# Patient Record
Sex: FEMALE | Race: WHITE | NOT HISPANIC OR LATINO | Employment: UNEMPLOYED | URBAN - METROPOLITAN AREA
[De-identification: names, ages, dates, MRNs, and addresses within clinical notes are randomized per-mention and may not be internally consistent; named-entity substitution may affect disease eponyms.]

---

## 2006-07-31 LAB
EXTERNAL HIV CONFIRMATION: NORMAL
EXTERNAL HIV SCREEN: NORMAL

## 2017-02-03 ENCOUNTER — ALLSCRIPTS OFFICE VISIT (OUTPATIENT)
Dept: OTHER | Facility: OTHER | Age: 47
End: 2017-02-03

## 2017-02-03 DIAGNOSIS — R42 DIZZINESS AND GIDDINESS: ICD-10-CM

## 2017-05-25 LAB — PAP: NORMAL

## 2017-05-31 ENCOUNTER — ALLSCRIPTS OFFICE VISIT (OUTPATIENT)
Dept: OTHER | Facility: OTHER | Age: 47
End: 2017-05-31

## 2017-10-05 ENCOUNTER — ALLSCRIPTS OFFICE VISIT (OUTPATIENT)
Dept: OTHER | Facility: OTHER | Age: 47
End: 2017-10-05

## 2017-10-27 NOTE — PROGRESS NOTES
Assessment  1  Acute sinusitis (461 9) (J01 90)   2  Acute pain of both ears (388 70) (H92 03)   3  Allergy to environmental factors (V15 09) (Z91 09)    Plan  Acute sinusitis    · Amoxicillin 875 MG Oral Tablet; Take 1 tablet twice daily   · PredniSONE 10 MG Oral Tablet; 3 tab po  qd x 2, 2 tab po qd x 2, 1  tab po qd x 2 then stop  Allergy to environmental factors    · Fluticasone Propionate 50 MCG/ACT Nasal Suspension; USE 2 SPRAYS IN EACH  NOSTRIL ONCE DAILY    Discussion/Summary    #1 acute sinusitisbilat ear painnasal congestion/allergyI reviewed with pt  Suspect that pt has ETD and sinusitis related to underlying allergy  Pt did not want cerumen removed due to ongoing ear painstart Amox 875 bid and pred as directed  Restart flonase and xyzal for allergies  Recheck 1 week if n chagne - earlier if worse  Pt to call for problems or concerns in the interim  The patient was counseled regarding instructions for management,-- risk factor reductions,-- prognosis,-- patient and family education,-- impressions,-- risks and benefits of treatment options,-- importance of compliance with treatment  Possible side effects of new medications were reviewed with the patient/guardian today  The treatment plan was reviewed with the patient/guardian  The patient/guardian understands and agrees with the treatment plan      Chief Complaint  ALLERGIES,EAR PAIN      History of Present Illness  HPI: as abovept with 5 day hx of congestion with frontal/facial HAs and ear pressure  Was on Xyzal with out improvement so she stopped  No fever but has chills  No thick nasal discharge  (+) increased facial pressure with change in head position  Improved with hot shower  Review of Systems    Constitutional: as noted in HPI    ENT: as noted in HPI  Cardiovascular: no complaints of slow or fast heart rate, no chest pain, no palpitations, no leg claudication or lower extremity edema     Respiratory: no complaints of shortness of breath, no wheezing, no dyspnea on exertion, no orthopnea or PND  Musculoskeletal: no complaints of arthralgia, no myalgia, no joint swelling or stiffness, no limb pain or swelling  Integumentary: no complaints of skin rash or lesion, no itching or dry skin, no skin wounds  Active Problems  1  Acute otitis media, unspecified laterality   2  Acute serous otitis media (381 01) (H65 00)   3  Acute sinusitis (461 9) (J01 90)   4  Acute URI (465 9) (J06 9)   5  Anxiety disorder (300 00) (F41 9)   6  Arthralgia (719 40) (M25 50)   7  Back pain (724 5) (M54 9)   8  Bilateral low back pain with sciatica, sciatica laterality unspecified   9  Blood pressure elevated (401 9) (I10)   10  Bronchitis (490) (J40)   11  Chest tightness or pressure (786 59) (R07 89)   12  Cigarette smoker (305 1) (F17 210)   13  Constipation, unspecified constipation type (564 00) (K59 00)   14  Cough (786 2) (R05)   15  DDD (degenerative disc disease), lumbosacral (722 52) (M51 37)   16  Depression (311) (F32 9)   17  Dysfunction of Eustachian tube, unspecified laterality (381 81) (H69 80)   18  Dysfunction of right eustachian tube (381 81) (H69 81)   19  Dyspnea (786 09) (R06 00)   20  Impacted cerumen, unspecified laterality (380 4) (H61 20)   21  Insomnia (780 52) (G47 00)   22  Lumbar radiculitis (724 4) (M54 16)   23  Myofascial pain (729 1) (M79 1)   24  Palpitations (785 1) (R00 2)   25  Parotitis (527 2)   26  Skin rash (782 1) (R21)   27  Vertigo (780 4) (R42)    Past Medical History  1  No pertinent past medical history  Active Problems And Past Medical History Reviewed: The active problems and past medical history were reviewed and updated today  Family History  Mother    1  Family history of Hypertension (V17 49)  Father    2   Family history of Coronary Artery Disease (V17 49)    Social History   · Denied: History of Alcohol Use (History)   · Caffeine Use   · Cigarette smoker (305 1) (F17 210)   · Current Every Day Smoker (305 1)   · Daily Coffee Consumption (___ Cups/Day)   · Marital History - Currently   The social history was reviewed and updated today  Surgical History  1  Denied: History Of Prior Surgery    Current Meds   1  Temazepam 30 MG Oral Capsule; TAKE 1 CAPSULE BY MOUTH EVERY NIGHT AT   BEDTIME; Therapy: [id number] to (Evaluate:18Oct2017)  Requested for: [id number]; Last   Rx:[id number] Ordered    The medication list was reviewed and updated today  Allergies  1  Ambien TABS  2  Eggs    Vitals   Recorded: [id number] 08:00AM   Temperature 97 6 F   Heart Rate 74   Respiration 16   Systolic 549   Diastolic 72   Height 5 ft 6 in   Weight 153 lb 4 oz   BMI Calculated 24 74   BSA Calculated 1 79     Physical Exam    Constitutional   General appearance: Abnormal  -- mildly ill appearing female in NAD  Eyes   Conjunctiva and lids: No swelling, erythema or discharge  Pupils and irises: Equal, round and reactive to light  Ears, Nose, Mouth, and Throat   External inspection of ears and nose: Normal     Otoscopic examination: Abnormal  -- bilat cerumen impaction  Nasal mucosa, septum, and turbinates: Abnormal  -- nose with sl congestion  ethmoid and maxillary sinuses sl TTP  Oropharynx: Normal with no erythema, edema, exudate or lesions  Pulmonary   Respiratory effort: No increased work of breathing or signs of respiratory distress  Auscultation of lungs: Clear to auscultation  Cardiovascular   Auscultation of heart: Normal rate and rhythm, normal S1 and S2, without murmurs  Lymphatic   Palpation of lymph nodes in neck: No lymphadenopathy  Skin   Skin and subcutaneous tissue: Normal without rashes or lesions           Signatures   Electronically signed by : SAMSON Henry ; Oct  5 2017 10:32AM EST                       (Author)

## 2018-01-13 VITALS
DIASTOLIC BLOOD PRESSURE: 80 MMHG | HEART RATE: 76 BPM | TEMPERATURE: 98.5 F | WEIGHT: 147.38 LBS | HEIGHT: 66 IN | SYSTOLIC BLOOD PRESSURE: 118 MMHG | BODY MASS INDEX: 23.69 KG/M2

## 2018-01-14 VITALS
DIASTOLIC BLOOD PRESSURE: 72 MMHG | BODY MASS INDEX: 24.63 KG/M2 | SYSTOLIC BLOOD PRESSURE: 118 MMHG | RESPIRATION RATE: 16 BRPM | HEART RATE: 74 BPM | HEIGHT: 66 IN | WEIGHT: 153.25 LBS | TEMPERATURE: 97.6 F

## 2018-01-15 VITALS
BODY MASS INDEX: 24.27 KG/M2 | DIASTOLIC BLOOD PRESSURE: 74 MMHG | TEMPERATURE: 96.7 F | SYSTOLIC BLOOD PRESSURE: 128 MMHG | RESPIRATION RATE: 16 BRPM | HEIGHT: 66 IN | WEIGHT: 151 LBS | HEART RATE: 78 BPM

## 2018-01-26 ENCOUNTER — TELEPHONE (OUTPATIENT)
Dept: FAMILY MEDICINE CLINIC | Facility: CLINIC | Age: 48
End: 2018-01-26

## 2018-01-29 ENCOUNTER — OFFICE VISIT (OUTPATIENT)
Dept: FAMILY MEDICINE CLINIC | Facility: CLINIC | Age: 48
End: 2018-01-29
Payer: COMMERCIAL

## 2018-01-29 VITALS
SYSTOLIC BLOOD PRESSURE: 130 MMHG | WEIGHT: 146 LBS | HEIGHT: 67 IN | TEMPERATURE: 97.8 F | BODY MASS INDEX: 22.91 KG/M2 | DIASTOLIC BLOOD PRESSURE: 78 MMHG | HEART RATE: 70 BPM

## 2018-01-29 DIAGNOSIS — F33.1 MODERATE EPISODE OF RECURRENT MAJOR DEPRESSIVE DISORDER (HCC): ICD-10-CM

## 2018-01-29 DIAGNOSIS — F17.200 SMOKER: ICD-10-CM

## 2018-01-29 DIAGNOSIS — F41.9 ANXIETY: Primary | ICD-10-CM

## 2018-01-29 PROCEDURE — 99213 OFFICE O/P EST LOW 20 MIN: CPT | Performed by: FAMILY MEDICINE

## 2018-01-29 RX ORDER — TEMAZEPAM 30 MG/1
1 CAPSULE ORAL DAILY
COMMUNITY
Start: 2014-03-17 | End: 2018-01-29 | Stop reason: ALTCHOICE

## 2018-01-29 RX ORDER — LORAZEPAM 2 MG/1
2 TABLET ORAL EVERY 8 HOURS PRN
Qty: 90 TABLET | Refills: 0 | Status: SHIPPED | OUTPATIENT
Start: 2018-01-29 | End: 2018-02-28 | Stop reason: ALTCHOICE

## 2018-01-29 NOTE — PROGRESS NOTES
Assessment/Plan: Moderate episode of recurrent major depressive disorder (HCC)  Change temazepam to lorazepam  Refer for counseling and psychiatry for eval and further treatment    Anxiety  Change meds as below       Diagnoses and all orders for this visit:    Anxiety  -     LORazepam (ATIVAN) 2 mg tablet; Take 1 tablet (2 mg total) by mouth every 8 (eight) hours as needed for anxiety for up to 30 days    Moderate episode of recurrent major depressive disorder (HCC)    Other orders  -     Discontinue: temazepam (RESTORIL) 30 mg capsule; Take 1 capsule by mouth daily        Discussion: counselled pt  Refer to [facility] (communication sent to Lili Melara for eval)  Change meds as above  Discussed side effects  Also counseled pt re: smoking  Recheck 1m  Subjective:      Patient ID: Rubia Roger is a 49 y o  female  50 yo female here with increased stress and anxiety  Multiple stressors (family - son with Aspergers and school issues, conflict with her parent)  Pt has been having issues with decreased sleep despite taking Temazepam 30mg qd  Pt was taking it bid over the last week due to increased anxiety/stress  Pt is interested in talking with someone as well as seeing a psychiatrist  Pt is afraid to take any new meds at this time due to trying to get sons issues with the school system settled  PHQ-9 done      Anxiety   Symptoms include nervous/anxious behavior  The following portions of the patient's history were reviewed and updated as appropriate:   She  has no past medical history on file  She  does not have any pertinent problems on file  Her family history is not on file  She  has no tobacco, alcohol, and drug history on file    Current Outpatient Prescriptions   Medication Sig Dispense Refill    LORazepam (ATIVAN) 2 mg tablet Take 1 tablet (2 mg total) by mouth every 8 (eight) hours as needed for anxiety for up to 30 days 90 tablet 0     No current facility-administered medications for this visit  No current outpatient prescriptions on file prior to visit  No current facility-administered medications on file prior to visit  She is allergic to eggs or egg-derived products and zolpidem       Review of Systems   Constitutional: Negative  HENT: Negative  Eyes: Negative  Respiratory: Negative  Cardiovascular: Negative  Gastrointestinal: Negative  Endocrine: Negative  Genitourinary: Negative  Musculoskeletal: Negative  Neurological: Negative  Psychiatric/Behavioral: Positive for dysphoric mood  Negative for agitation, behavioral problems, hallucinations and self-injury  The patient is nervous/anxious  The patient is not hyperactive  Objective:     Physical Exam   Constitutional: She is oriented to person, place, and time  She appears well-developed and well-nourished  HENT:   Head: Normocephalic and atraumatic  Right Ear: External ear normal    Left Ear: External ear normal    Mouth/Throat: Oropharynx is clear and moist    Eyes: Conjunctivae and EOM are normal  Pupils are equal, round, and reactive to light  Neck: Normal range of motion  Neck supple  No thyromegaly present  Cardiovascular: Normal rate and regular rhythm  No murmur heard  Pulmonary/Chest: Effort normal and breath sounds normal    Neurological: She is alert and oriented to person, place, and time  Psychiatric:   Anxious  Appropriate  PHQ-9 = 16   No suicidality

## 2018-01-29 NOTE — LETTER
Nickie Schultz,     I have a young woman who is suffering from worsening anxiety some depression complicated by insomnia  Multiple stressors  Really would benefit from counseling as well as psychiatric eval  Could you or Izabella reach out to her?   Thanks    son

## 2018-02-28 ENCOUNTER — OFFICE VISIT (OUTPATIENT)
Dept: FAMILY MEDICINE CLINIC | Facility: CLINIC | Age: 48
End: 2018-02-28
Payer: COMMERCIAL

## 2018-02-28 VITALS
HEIGHT: 67 IN | HEART RATE: 70 BPM | WEIGHT: 147 LBS | SYSTOLIC BLOOD PRESSURE: 124 MMHG | DIASTOLIC BLOOD PRESSURE: 78 MMHG | BODY MASS INDEX: 23.07 KG/M2 | TEMPERATURE: 98.6 F

## 2018-02-28 DIAGNOSIS — F41.9 ANXIETY: Primary | ICD-10-CM

## 2018-02-28 DIAGNOSIS — F33.1 MODERATE EPISODE OF RECURRENT MAJOR DEPRESSIVE DISORDER (HCC): ICD-10-CM

## 2018-02-28 PROCEDURE — 99213 OFFICE O/P EST LOW 20 MIN: CPT | Performed by: FAMILY MEDICINE

## 2018-02-28 RX ORDER — ALPRAZOLAM 1 MG/1
1 TABLET ORAL 3 TIMES DAILY PRN
Qty: 90 TABLET | Refills: 0 | Status: SHIPPED | OUTPATIENT
Start: 2018-02-28 | End: 2018-02-28 | Stop reason: CLARIF

## 2018-02-28 RX ORDER — LORAZEPAM 2 MG/1
2 TABLET ORAL EVERY 8 HOURS PRN
Qty: 90 TABLET | Refills: 0
Start: 2018-02-28 | End: 2018-03-19 | Stop reason: ALTCHOICE

## 2018-02-28 NOTE — PROGRESS NOTES
Assessment/Plan:     Diagnoses and all orders for this visit:    Anxiety  -     Discontinue: ALPRAZolam (XANAX) 1 mg tablet; Take 1 tablet (1 mg total) by mouth 3 (three) times a day as needed for anxiety  -     LORazepam (ATIVAN) 2 mg tablet; Take 1 tablet (2 mg total) by mouth every 8 (eight) hours as needed for anxiety for up to 30 days    Moderate episode of recurrent major depressive disorder ([facility] [place] [facility] )      Discussion:  Unclear if initial side effects to lorazepam related to withdrawal from Restoril or other cause  Patient did use medications successfully to stop a panic attack  But given to changing patient to alprazolam but after further discussion we decided to continue patient on present meds for 2 more weeks  I will reach out to [facility] to see if we can facilitate obtaining appointment with Psychiatry for her  Again patient has failed multiple medications in the past and really could use a 2nd opinion  Patient will call in 2 weeks with [id number] if worse  Patient call for problems or concerns in the interim      Subjective:      Patient ID: Anne Marie Brewster is a 49 y o  female  - pt notes that she does not feel well on the Lorazepam   It seems to upset her stomach and  notes that she may be a little more agitated on it  Pt did speak with [facility]) but pt has not been able to make appt yet  Pt did take it succesfully to abort a panic attack  Typically, pt has been taking 1/2 q noon and 1 in the evening  Denies suicidality / violent thoughts  Sleep still labile        The following portions of the patient's history were reviewed and updated as appropriate:   She  has no past medical history on file    She   Patient Active Problem List    Diagnosis Date Noted    Moderate episode of recurrent major depressive disorder ([facility] [place] [facility] ) 01/29/2018    Anxiety 01/29/2018    Depression 02/26/2016    DDD (degenerative disc disease), lumbosacral 11/16/2015    Lumbar radiculitis 10/20/2015    Chronic bilateral low back pain with sciatica 10/20/2015    Essential (primary) hypertension 04/07/2015    Anxiety disorder 09/06/2012    Insomnia 08/28/2012     She  reports that she has been smoking Cigarettes  She does not have any smokeless tobacco history on file  She reports that she does not drink alcohol  Her drug history is not on file  Current Outpatient Prescriptions   Medication Sig Dispense Refill    LORazepam (ATIVAN) 2 mg tablet Take 1 tablet (2 mg total) by mouth every 8 (eight) hours as needed for anxiety for up to 30 days 90 tablet 0     No current facility-administered medications for this visit  She is allergic to eggs or egg-derived products and zolpidem       Review of Systems   Constitutional: Negative  HENT: Negative  Eyes: Negative  Respiratory: Negative  Cardiovascular: Negative  Gastrointestinal: Negative  Endocrine: Negative  Genitourinary: Negative  Musculoskeletal: Negative  Neurological: Negative  Psychiatric/Behavioral: Positive for dysphoric mood  Negative for agitation, behavioral problems, hallucinations and self-injury  The patient is nervous/anxious  The patient is not hyperactive  Objective:      /78   Pulse 70   Temp 98 6 °F (37 °C)   Ht 5' 7" (1 702 m)   Wt 66 7 kg (147 lb)   BMI 23 02 kg/m²          Physical Exam   Constitutional: She appears well-developed and well-nourished  HENT:   Head: Normocephalic and atraumatic  Right Ear: External ear normal    Left Ear: External ear normal    Nose: Nose normal    Mouth/Throat: Oropharynx is clear and moist    Eyes: Conjunctivae and EOM are normal  Pupils are equal, round, and reactive to light  Neck: Normal range of motion  Neck supple  No thyromegaly present  Cardiovascular: Normal rate and regular rhythm  Pulmonary/Chest: Effort normal and breath sounds normal    Psychiatric: Her mood appears anxious   Her affect is not labile and not inappropriate  Her speech is not rapid and/or pressured, not delayed, not tangential and not slurred  She is agitated  She is not aggressive, not hyperactive, not slowed, not withdrawn, not actively hallucinating and not combative  Thought content is not paranoid and not delusional  She does not express impulsivity or inappropriate judgment  She does not exhibit a depressed mood  She expresses no homicidal and no suicidal ideation  She is communicative

## 2018-02-28 NOTE — LETTER
Arabella Callahan,     I am with Leeann Mejia now  She is going to try to get in with you but I was wondering if you would be able to help us get her in for a psychiatric eval  Pt has failed multiple meds for her anxiety and I really feel that she would benefit from a second opinion at this point  Let me know   Thanks    [facility]

## 2018-03-09 ENCOUNTER — TELEPHONE (OUTPATIENT)
Dept: FAMILY MEDICINE CLINIC | Facility: CLINIC | Age: 48
End: 2018-03-09

## 2018-03-09 NOTE — TELEPHONE ENCOUNTER
STARTED A NEW MED LAST WEEK LORAZEPAM 2MG     SHE SAID ITS HELPING HER   AND WILL CONTINUE TO TAKE IT

## 2018-03-19 ENCOUNTER — TELEPHONE (OUTPATIENT)
Dept: FAMILY MEDICINE CLINIC | Facility: CLINIC | Age: 48
End: 2018-03-19

## 2018-03-19 DIAGNOSIS — F51.01 PRIMARY INSOMNIA: ICD-10-CM

## 2018-03-19 DIAGNOSIS — F41.9 ANXIETY: ICD-10-CM

## 2018-03-19 DIAGNOSIS — F41.9 ANXIETY: Primary | ICD-10-CM

## 2018-03-19 RX ORDER — TEMAZEPAM 30 MG/1
30 CAPSULE ORAL 2 TIMES DAILY PRN
Qty: 60 CAPSULE | Refills: 0 | Status: SHIPPED | OUTPATIENT
Start: 2018-03-19 | End: 2018-03-19 | Stop reason: SDUPTHER

## 2018-03-19 RX ORDER — TEMAZEPAM 30 MG/1
30 CAPSULE ORAL 2 TIMES DAILY PRN
Qty: 60 CAPSULE | Refills: 0 | Status: SHIPPED | OUTPATIENT
Start: 2018-03-19 | End: 2018-04-18 | Stop reason: SDUPTHER

## 2018-03-19 NOTE — TELEPHONE ENCOUNTER
Lorazapam is not helping, she is aggitated, blur  Vision, stom  Issues, she is on the sec  Month of it  Wants to go bk on ? Temasapam but a 15 mg #60 and [facility]  Pl adv

## 2018-03-19 NOTE — TELEPHONE ENCOUNTER
I will write enough for one month but she has to agree to get an appt with psychiatry  Also, she has to use the [facility] to be get the discount and to find which store has it the cheapest  We will print out the script   She has to bring in her lorazepam for us to dispense when she picks up the script

## 2018-04-18 DIAGNOSIS — F41.9 ANXIETY: ICD-10-CM

## 2018-04-18 DIAGNOSIS — F51.01 PRIMARY INSOMNIA: ICD-10-CM

## 2018-04-18 RX ORDER — TEMAZEPAM 30 MG/1
30 CAPSULE ORAL 2 TIMES DAILY PRN
Qty: 60 CAPSULE | Refills: 0 | Status: SHIPPED | OUTPATIENT
Start: 2018-04-18 | End: 2018-05-25 | Stop reason: SDUPTHER

## 2018-05-25 DIAGNOSIS — F51.01 PRIMARY INSOMNIA: ICD-10-CM

## 2018-05-25 DIAGNOSIS — F41.9 ANXIETY: ICD-10-CM

## 2018-05-25 RX ORDER — TEMAZEPAM 30 MG/1
30 CAPSULE ORAL 2 TIMES DAILY PRN
Qty: 60 CAPSULE | Refills: 0 | Status: SHIPPED | OUTPATIENT
Start: 2018-05-25 | End: 2018-07-02 | Stop reason: SDUPTHER

## 2018-06-12 ENCOUNTER — OFFICE VISIT (OUTPATIENT)
Dept: FAMILY MEDICINE CLINIC | Facility: CLINIC | Age: 48
End: 2018-06-12
Payer: COMMERCIAL

## 2018-06-12 VITALS
HEART RATE: 70 BPM | WEIGHT: 159.6 LBS | SYSTOLIC BLOOD PRESSURE: 130 MMHG | DIASTOLIC BLOOD PRESSURE: 82 MMHG | HEIGHT: 67 IN | BODY MASS INDEX: 25.05 KG/M2 | TEMPERATURE: 97.9 F

## 2018-06-12 DIAGNOSIS — J01.90 ACUTE NON-RECURRENT SINUSITIS, UNSPECIFIED LOCATION: Primary | ICD-10-CM

## 2018-06-12 DIAGNOSIS — B37.9 YEAST INFECTION: ICD-10-CM

## 2018-06-12 PROCEDURE — 3008F BODY MASS INDEX DOCD: CPT | Performed by: NURSE PRACTITIONER

## 2018-06-12 PROCEDURE — 99213 OFFICE O/P EST LOW 20 MIN: CPT | Performed by: NURSE PRACTITIONER

## 2018-06-12 RX ORDER — AMOXICILLIN 875 MG/1
875 TABLET, COATED ORAL 2 TIMES DAILY
Qty: 20 TABLET | Refills: 0 | Status: SHIPPED | OUTPATIENT
Start: 2018-06-12 | End: 2018-06-22

## 2018-06-12 RX ORDER — FLUCONAZOLE 150 MG/1
150 TABLET ORAL ONCE
Qty: 1 TABLET | Refills: 0 | Status: SHIPPED | OUTPATIENT
Start: 2018-06-12 | End: 2018-06-12

## 2018-06-12 NOTE — PROGRESS NOTES
Patient ID: James Danielson is a 49 y o  female  HPI: 49 y  o female presenting with nasal congestion, right ear pain/pressure and non-productive cough for last week and half  Patient reports that she as been taking Zyrtec to treat allergy symptoms but she keeps feeling worsen with time  She denies fever, chills or fatigue being associated with above symptoms  She also reports that sometimes she feels like her right ear becomes completely blocked with decreased hearing occurring and she can usually clear it by tilted her head backwards  SUBJECTIVE    Family History   Problem Relation Age of Onset    Hypertension Mother     Coronary artery disease Father      Social History     Social History    Marital status: /Civil Union     Spouse name: N/A    Number of children: N/A    Years of education: N/A     Occupational History    Not on file  Social History Main Topics    Smoking status: Current Every Day Smoker     Types: Cigarettes    Smokeless tobacco: Not on file    Alcohol use No    Drug use: Unknown    Sexual activity: Not on file     Other Topics Concern    Not on file     Social History Narrative    Caffeine use    Daily coffee consumption     No past medical history on file  No past surgical history on file    Allergies   Allergen Reactions    Eggs Or Egg-Derived Products Abdominal Pain    Zolpidem Confusion       Current Outpatient Prescriptions:     temazepam (RESTORIL) 30 mg capsule, Take 1 capsule (30 mg total) by mouth 2 (two) times a day as needed for sleep, Disp: 60 capsule, Rfl: 0    Review of Systems    Consitutional:  Denies chills, fatigue and fever   ENT:  Positive for right ear pain/pressure, loss of hearing, nasal discharge, nasal congestion, post nasal drip and itchy/watery eyes   Pulmonary:  Denies cough, shortness of breath or dyspnea on exertion    Cardiovascular:  Denies chest pain/pressure   Musculoskeletal:  Denies myalgia, arthalgia or muscle weakness Integumentary:  Denies ecchymosis, petechiae, rash or lesions   Neurological:  Denies headaches, dizziness, confusion, loss of consciousness or behavioral changes  Psychological:  Denies anxiety, depression or sleep disturbances      OBJECTIVE    /82   Pulse 70   Temp 97 9 °F (36 6 °C)   Ht 5' 7" (1 702 m)   Wt 72 4 kg (159 lb 9 6 oz)   BMI 25 00 kg/m²     Constitutional:  Well appearing and in no acute distress  ENT:  Right TM dull without erythema or effusion noted, small amount of cerumen in canal, posterior pharynx erythematous with post nasal drip noted, maxillary sinus tender to palpation with positive forward head tilt, and nasal mucosa erythematous and congested   Pulmonary:  clear to auscultation bilaterally and no crackles, no wheezes, chest expansion normal  Cardiovascular:  S1S2, regular rate and rhythm  Lymphatic:  no lymphadenopathy   Musculoskeletal:  no muscular tenderness noted  Skin:  skin color, texture and turgor are normal; no bruising, rashes or lesions noted  Neurologic:  Alert and oriented x 4 and Affect and mood normal      Assessment/Plan:  Diagnoses and all orders for this visit:    Acute non-recurrent sinusitis, unspecified location  -     amoxicillin (AMOXIL) 875 mg tablet; Take 1 tablet (875 mg total) by mouth 2 (two) times a day for 10 days    Yeast infection  -     fluconazole (DIFLUCAN) 150 mg tablet;  Take 1 tablet (150 mg total) by mouth once for 1 dose      #1 Acute non-recurrent sinusitis  Reviewed with patient plan to treat with amoxicillin 875 mg twice a day for 10 days  Recommended use of Mucinex to thin secretions  #2 Yeast infection  Discussed with patient frequent occurrence of yeast infection when on antibiotics so fluconazole 150 mg once given as prophylaxis  Patient instructed to call in 72 hours if not feeling better or if symptoms worsen

## 2018-07-02 DIAGNOSIS — F41.9 ANXIETY: ICD-10-CM

## 2018-07-02 DIAGNOSIS — F51.01 PRIMARY INSOMNIA: ICD-10-CM

## 2018-07-02 RX ORDER — TEMAZEPAM 30 MG/1
30 CAPSULE ORAL 2 TIMES DAILY PRN
Qty: 60 CAPSULE | Refills: 0 | Status: SHIPPED | OUTPATIENT
Start: 2018-07-02 | End: 2018-08-02 | Stop reason: SDUPTHER

## 2018-08-02 DIAGNOSIS — F51.01 PRIMARY INSOMNIA: ICD-10-CM

## 2018-08-02 DIAGNOSIS — F41.9 ANXIETY: ICD-10-CM

## 2018-08-02 RX ORDER — TEMAZEPAM 30 MG/1
30 CAPSULE ORAL 2 TIMES DAILY PRN
Qty: 60 CAPSULE | Refills: 0 | Status: SHIPPED | OUTPATIENT
Start: 2018-08-02 | End: 2018-09-06 | Stop reason: SDUPTHER

## 2018-09-06 DIAGNOSIS — F51.01 PRIMARY INSOMNIA: ICD-10-CM

## 2018-09-06 DIAGNOSIS — F41.9 ANXIETY: ICD-10-CM

## 2018-09-06 RX ORDER — TEMAZEPAM 30 MG/1
30 CAPSULE ORAL 2 TIMES DAILY PRN
Qty: 120 CAPSULE | Refills: 0 | Status: CANCELLED | OUTPATIENT
Start: 2018-09-06

## 2018-09-07 RX ORDER — TEMAZEPAM 30 MG/1
30 CAPSULE ORAL 2 TIMES DAILY PRN
Qty: 60 CAPSULE | Refills: 0 | Status: SHIPPED | OUTPATIENT
Start: 2018-09-07 | End: 2018-10-12 | Stop reason: SDUPTHER

## 2018-10-02 ENCOUNTER — OFFICE VISIT (OUTPATIENT)
Dept: FAMILY MEDICINE CLINIC | Facility: CLINIC | Age: 48
End: 2018-10-02
Payer: COMMERCIAL

## 2018-10-02 VITALS
SYSTOLIC BLOOD PRESSURE: 144 MMHG | WEIGHT: 156.4 LBS | BODY MASS INDEX: 24.55 KG/M2 | TEMPERATURE: 96.9 F | HEIGHT: 67 IN | DIASTOLIC BLOOD PRESSURE: 88 MMHG | HEART RATE: 66 BPM

## 2018-10-02 DIAGNOSIS — J01.00 ACUTE NON-RECURRENT MAXILLARY SINUSITIS: Primary | ICD-10-CM

## 2018-10-02 DIAGNOSIS — H91.92 DECREASED HEARING OF LEFT EAR: ICD-10-CM

## 2018-10-02 PROCEDURE — 99213 OFFICE O/P EST LOW 20 MIN: CPT | Performed by: NURSE PRACTITIONER

## 2018-10-02 PROCEDURE — 69210 REMOVE IMPACTED EAR WAX UNI: CPT | Performed by: NURSE PRACTITIONER

## 2018-10-02 RX ORDER — AZITHROMYCIN 250 MG/1
TABLET, FILM COATED ORAL
Qty: 6 TABLET | Refills: 0 | Status: SHIPPED | OUTPATIENT
Start: 2018-10-02 | End: 2018-10-06

## 2018-10-02 NOTE — PROGRESS NOTES
Assessment/Plan:     Diagnoses and all orders for this visit:    Acute non-recurrent maxillary sinusitis  -     azithromycin (ZITHROMAX) 250 mg tablet; Take 2 tabs on Day 1 than 1 tab Days 2-5    Decreased hearing of left ear  -     Ear cerumen removal    Reviewed with patient plan to treat with above antibiotic  Patient instructed to call if no improvement in 72 hours or symptoms worsen    Subjective:      Patient ID: Jenny Felipe is a 49 y o  female  49year old female presenting with left ear pressure/clogged and slight sinus pressure for past few days  She denies fever, chills, fatigue or muscle aches being associated with above symptoms  Patient as not used any form of treatment modalities to manage the symptoms  Patient as history of cerumen impaction  Family History   Problem Relation Age of Onset    Hypertension Mother     Coronary artery disease Father      Social History     Social History    Marital status: /Civil Union     Spouse name: N/A    Number of children: N/A    Years of education: N/A     Occupational History    Not on file  Social History Main Topics    Smoking status: Current Every Day Smoker     Types: Cigarettes    Smokeless tobacco: Not on file    Alcohol use No    Drug use: Unknown    Sexual activity: Not on file     Other Topics Concern    Not on file     Social History Narrative    Caffeine use    Daily coffee consumption     No past medical history on file  No past surgical history on file  Allergies   Allergen Reactions    Eggs Or Egg-Derived Products Abdominal Pain    Zolpidem Confusion       Current Outpatient Prescriptions:     temazepam (RESTORIL) 30 mg capsule, Take 1 capsule (30 mg total) by mouth 2 (two) times a day as needed for sleep, Disp: 60 capsule, Rfl: 0    azithromycin (ZITHROMAX) 250 mg tablet, Take 2 tabs on Day 1 than 1 tab Days 2-5, Disp: 6 tablet, Rfl: 0    Review of Systems   Constitutional: Negative      HENT: Positive for congestion and ear pain  Eyes: Negative  Respiratory: Positive for cough ( occasional non-productive)  Cardiovascular: Negative  Gastrointestinal: Negative  Endocrine: Negative  Genitourinary: Negative  Musculoskeletal: Negative  Skin: Negative  Allergic/Immunologic: Negative  Neurological: Negative  Hematological: Negative  Psychiatric/Behavioral: Negative  Objective:    /88   Pulse 66   Temp (!) 96 9 °F (36 1 °C)   Ht 5' 7" (1 702 m)   Wt 70 9 kg (156 lb 6 4 oz)   BMI 24 50 kg/m² (Reviewed)     Physical Exam   Constitutional: She is oriented to person, place, and time  Vital signs are normal  She appears well-developed and well-nourished  HENT:   Head: Normocephalic and atraumatic  Right Ear: External ear and ear canal normal  Tympanic membrane is erythematous  Left Ear: External ear and ear canal normal  Tympanic membrane is erythematous  Decreased hearing is noted  Nose: Mucosal edema and rhinorrhea present  Right sinus exhibits maxillary sinus tenderness  Left sinus exhibits maxillary sinus tenderness  Mouth/Throat: Uvula is midline and mucous membranes are normal  Posterior oropharyngeal erythema present  Cerumen required to be removed prior to visualizing TM   Eyes: Pupils are equal, round, and reactive to light  Conjunctivae, EOM and lids are normal    Cardiovascular: Normal rate, regular rhythm and normal heart sounds  Pulmonary/Chest: Effort normal and breath sounds normal    Neurological: She is alert and oriented to person, place, and time  Skin: Skin is warm and dry  Psychiatric: She has a normal mood and affect   Her behavior is normal          Ear cerumen removal  Date/Time: 10/2/2018 1:01 PM  Performed by: Jerry Lunsford  Authorized by: Jerry Lunsford     Patient location:  Clinic  Consent:     Consent obtained:  Verbal    Consent given by:  Patient    Risks discussed:  Bleeding, dizziness, infection, incomplete removal, pain and TM perforation    Alternatives discussed:  No treatment and observation  Universal protocol:     Procedure explained and questions answered to patient or proxy's satisfaction: yes      Immediately prior to procedure a time out was called: yes      Patient identity confirmed:  Verbally with patient  Procedure details:     Local anesthetic:  None    Location:  L ear    Procedure type: curette    Post-procedure details:     Complication:  None    Hearing quality:  Improved    Patient tolerance of procedure:   Tolerated well, no immediate complications

## 2018-10-12 DIAGNOSIS — F41.9 ANXIETY: ICD-10-CM

## 2018-10-12 DIAGNOSIS — F51.01 PRIMARY INSOMNIA: ICD-10-CM

## 2018-10-12 RX ORDER — TEMAZEPAM 30 MG/1
30 CAPSULE ORAL 2 TIMES DAILY PRN
Qty: 60 CAPSULE | Refills: 0 | Status: SHIPPED | OUTPATIENT
Start: 2018-10-12 | End: 2018-11-12 | Stop reason: SDUPTHER

## 2018-11-12 DIAGNOSIS — F51.01 PRIMARY INSOMNIA: ICD-10-CM

## 2018-11-12 DIAGNOSIS — F41.9 ANXIETY: ICD-10-CM

## 2018-11-12 RX ORDER — TEMAZEPAM 30 MG/1
30 CAPSULE ORAL 2 TIMES DAILY PRN
Qty: 60 CAPSULE | Refills: 0 | Status: SHIPPED | OUTPATIENT
Start: 2018-11-12 | End: 2018-12-18 | Stop reason: SDUPTHER

## 2018-12-18 DIAGNOSIS — F41.9 ANXIETY: ICD-10-CM

## 2018-12-18 DIAGNOSIS — F51.01 PRIMARY INSOMNIA: ICD-10-CM

## 2018-12-18 RX ORDER — TEMAZEPAM 30 MG/1
30 CAPSULE ORAL 2 TIMES DAILY PRN
Qty: 60 CAPSULE | Refills: 0 | Status: SHIPPED | OUTPATIENT
Start: 2018-12-18 | End: 2019-01-23 | Stop reason: SDUPTHER

## 2019-01-23 ENCOUNTER — TELEPHONE (OUTPATIENT)
Dept: FAMILY MEDICINE CLINIC | Facility: CLINIC | Age: 49
End: 2019-01-23

## 2019-01-23 DIAGNOSIS — F51.01 PRIMARY INSOMNIA: Primary | ICD-10-CM

## 2019-01-23 DIAGNOSIS — F51.01 PRIMARY INSOMNIA: ICD-10-CM

## 2019-01-23 DIAGNOSIS — F41.9 ANXIETY: ICD-10-CM

## 2019-01-23 RX ORDER — TEMAZEPAM 30 MG/1
30 CAPSULE ORAL
Qty: 30 CAPSULE | Refills: 0 | Status: SHIPPED | OUTPATIENT
Start: 2019-01-23 | End: 2019-01-28 | Stop reason: SDUPTHER

## 2019-01-23 RX ORDER — TEMAZEPAM 30 MG/1
CAPSULE ORAL
Qty: 60 CAPSULE | Refills: 0 | Status: SHIPPED | OUTPATIENT
Start: 2019-01-23 | End: 2019-01-23 | Stop reason: SDUPTHER

## 2019-01-25 ENCOUNTER — TELEPHONE (OUTPATIENT)
Dept: FAMILY MEDICINE CLINIC | Facility: CLINIC | Age: 49
End: 2019-01-25

## 2019-01-25 NOTE — TELEPHONE ENCOUNTER
Temazepam 30mg - 30 day supply was $0 82, I can do a prior auth for medication if you wanted directions to continue at two tabs at bedtime, for a 60 day supply for next month  please let me know the correct sig

## 2019-01-28 DIAGNOSIS — F51.01 PRIMARY INSOMNIA: ICD-10-CM

## 2019-01-28 RX ORDER — TEMAZEPAM 30 MG/1
CAPSULE ORAL
Qty: 60 CAPSULE | Refills: 0 | Status: SHIPPED | OUTPATIENT
Start: 2019-01-28 | End: 2019-04-04 | Stop reason: SDUPTHER

## 2019-02-22 DIAGNOSIS — F51.01 PRIMARY INSOMNIA: ICD-10-CM

## 2019-02-23 RX ORDER — TEMAZEPAM 30 MG/1
CAPSULE ORAL
Qty: 60 CAPSULE | Refills: 0 | OUTPATIENT
Start: 2019-02-23

## 2019-03-27 ENCOUNTER — OFFICE VISIT (OUTPATIENT)
Dept: FAMILY MEDICINE CLINIC | Facility: CLINIC | Age: 49
End: 2019-03-27
Payer: COMMERCIAL

## 2019-03-27 ENCOUNTER — APPOINTMENT (OUTPATIENT)
Dept: LAB | Age: 49
End: 2019-03-27
Payer: COMMERCIAL

## 2019-03-27 VITALS
DIASTOLIC BLOOD PRESSURE: 84 MMHG | TEMPERATURE: 98.4 F | HEART RATE: 68 BPM | SYSTOLIC BLOOD PRESSURE: 128 MMHG | WEIGHT: 151.4 LBS | HEIGHT: 67 IN | BODY MASS INDEX: 23.76 KG/M2

## 2019-03-27 DIAGNOSIS — L50.9 URTICARIA: Primary | ICD-10-CM

## 2019-03-27 DIAGNOSIS — L50.9 URTICARIA: ICD-10-CM

## 2019-03-27 DIAGNOSIS — G89.29 CHRONIC LEFT-SIDED LOW BACK PAIN WITH BILATERAL SCIATICA: ICD-10-CM

## 2019-03-27 DIAGNOSIS — M54.42 CHRONIC LEFT-SIDED LOW BACK PAIN WITH BILATERAL SCIATICA: ICD-10-CM

## 2019-03-27 DIAGNOSIS — N92.6 IRREGULAR MENSTRUATION: ICD-10-CM

## 2019-03-27 DIAGNOSIS — M54.41 CHRONIC LEFT-SIDED LOW BACK PAIN WITH BILATERAL SCIATICA: ICD-10-CM

## 2019-03-27 LAB
ALBUMIN SERPL BCP-MCNC: 4.4 G/DL (ref 3.5–5)
ALP SERPL-CCNC: 66 U/L (ref 46–116)
ALT SERPL W P-5'-P-CCNC: 20 U/L (ref 12–78)
ANION GAP SERPL CALCULATED.3IONS-SCNC: 7 MMOL/L (ref 4–13)
AST SERPL W P-5'-P-CCNC: 10 U/L (ref 5–45)
BASOPHILS # BLD AUTO: 0.04 THOUSANDS/ΜL (ref 0–0.1)
BASOPHILS NFR BLD AUTO: 0 % (ref 0–1)
BILIRUB SERPL-MCNC: 0.29 MG/DL (ref 0.2–1)
BUN SERPL-MCNC: 10 MG/DL (ref 5–25)
CALCIUM SERPL-MCNC: 9.3 MG/DL (ref 8.3–10.1)
CHLORIDE SERPL-SCNC: 103 MMOL/L (ref 100–108)
CO2 SERPL-SCNC: 26 MMOL/L (ref 21–32)
CREAT SERPL-MCNC: 0.92 MG/DL (ref 0.6–1.3)
EOSINOPHIL # BLD AUTO: 0.02 THOUSAND/ΜL (ref 0–0.61)
EOSINOPHIL NFR BLD AUTO: 0 % (ref 0–6)
ERYTHROCYTE [DISTWIDTH] IN BLOOD BY AUTOMATED COUNT: 12.9 % (ref 11.6–15.1)
ERYTHROCYTE [SEDIMENTATION RATE] IN BLOOD: 5 MM/HOUR (ref 0–20)
GFR SERPL CREATININE-BSD FRML MDRD: 73 ML/MIN/1.73SQ M
GLUCOSE SERPL-MCNC: 86 MG/DL (ref 65–140)
HCT VFR BLD AUTO: 49.3 % (ref 34.8–46.1)
HGB BLD-MCNC: 16.2 G/DL (ref 11.5–15.4)
IMM GRANULOCYTES # BLD AUTO: 0.03 THOUSAND/UL (ref 0–0.2)
IMM GRANULOCYTES NFR BLD AUTO: 0 % (ref 0–2)
LYMPHOCYTES # BLD AUTO: 2.5 THOUSANDS/ΜL (ref 0.6–4.47)
LYMPHOCYTES NFR BLD AUTO: 21 % (ref 14–44)
MCH RBC QN AUTO: 31.2 PG (ref 26.8–34.3)
MCHC RBC AUTO-ENTMCNC: 32.9 G/DL (ref 31.4–37.4)
MCV RBC AUTO: 95 FL (ref 82–98)
MONOCYTES # BLD AUTO: 0.83 THOUSAND/ΜL (ref 0.17–1.22)
MONOCYTES NFR BLD AUTO: 7 % (ref 4–12)
NEUTROPHILS # BLD AUTO: 8.62 THOUSANDS/ΜL (ref 1.85–7.62)
NEUTS SEG NFR BLD AUTO: 72 % (ref 43–75)
NRBC BLD AUTO-RTO: 0 /100 WBCS
PLATELET # BLD AUTO: 397 THOUSANDS/UL (ref 149–390)
PMV BLD AUTO: 10.7 FL (ref 8.9–12.7)
POTASSIUM SERPL-SCNC: 4.4 MMOL/L (ref 3.5–5.3)
PROT SERPL-MCNC: 8.2 G/DL (ref 6.4–8.2)
RBC # BLD AUTO: 5.19 MILLION/UL (ref 3.81–5.12)
SODIUM SERPL-SCNC: 136 MMOL/L (ref 136–145)
TSH SERPL DL<=0.05 MIU/L-ACNC: 1.93 UIU/ML (ref 0.36–3.74)
WBC # BLD AUTO: 12.04 THOUSAND/UL (ref 4.31–10.16)

## 2019-03-27 PROCEDURE — 36415 COLL VENOUS BLD VENIPUNCTURE: CPT

## 2019-03-27 PROCEDURE — 84443 ASSAY THYROID STIM HORMONE: CPT

## 2019-03-27 PROCEDURE — 82785 ASSAY OF IGE: CPT

## 2019-03-27 PROCEDURE — 86003 ALLG SPEC IGE CRUDE XTRC EA: CPT

## 2019-03-27 PROCEDURE — 85652 RBC SED RATE AUTOMATED: CPT

## 2019-03-27 PROCEDURE — 99214 OFFICE O/P EST MOD 30 MIN: CPT | Performed by: FAMILY MEDICINE

## 2019-03-27 PROCEDURE — 80053 COMPREHEN METABOLIC PANEL: CPT

## 2019-03-27 PROCEDURE — 85025 COMPLETE CBC W/AUTO DIFF WBC: CPT

## 2019-03-27 RX ORDER — PREDNISONE 20 MG/1
TABLET ORAL
Qty: 12 TABLET | Refills: 0 | Status: SHIPPED | OUTPATIENT
Start: 2019-03-27 | End: 2019-04-05 | Stop reason: ALTCHOICE

## 2019-03-27 RX ORDER — RANITIDINE 300 MG/1
300 CAPSULE ORAL EVERY EVENING
Qty: 30 CAPSULE | Refills: 1 | Status: SHIPPED | OUTPATIENT
Start: 2019-03-27 | End: 2019-04-05 | Stop reason: ALTCHOICE

## 2019-03-27 RX ORDER — CETIRIZINE HYDROCHLORIDE 10 MG/1
10 TABLET ORAL DAILY
Qty: 30 TABLET | Refills: 1 | Status: SHIPPED | OUTPATIENT
Start: 2019-03-27 | End: 2019-04-05 | Stop reason: ALTCHOICE

## 2019-03-27 NOTE — PROGRESS NOTES
Assessment/Plan:    Urticaria  ? Cause  Check allergy panels  Start pred taper  Add zyrtec and ranitidine  Recheck 2 weeks if not improved    Chronic bilateral low back pain with sciatica  I reviewed with pt  Start pred taper  Refer to PT  Recheck 3-4 weeks if not improved       Diagnoses and all orders for this visit:    Urticaria  -     CBC and differential; Future  -     Comprehensive metabolic panel; Future  -     [facility]ergy Panel, Adult; Future  -     Food Allergy Profile; Future  -     Sedimentation rate, automated; Future  -     predniSONE 20 mg tablet; 2 tab qd x 4 then 1 qd x 4  -     cetirizine (ZyrTEC) 10 mg tablet; Take 1 tablet (10 mg total) by mouth daily  -     ranitidine (ZANTAC) 300 MG capsule; Take 1 capsule (300 mg total) by mouth every evening    Chronic left-sided low back pain with bilateral sciatica  -     predniSONE 20 mg tablet; 2 tab qd x 4 then 1 qd x 4  -     Ambulatory referral to Physical Therapy; Future    Irregular menstruation  Comments:  ? menopausal?  Refer to Gyn  Orders:  -     TSH, 3rd generation with Free T4 reflex; Future          Subjective:      Patient ID: Filiberto Durham is a 51 y o  female  f/u multiple med issues  - pt developed urticarial lesions off and on since Christmas  Pt states that she develops an itchy area that then develops into urticaria  Lesions are linear at times but pt is not sure that trauma brings it out  Can occur on trunk and extremities  No new detergents, exposures or other products  Pt brought in pictures that show urticaria  - pt also notes that her menstrual cycle has been irregular over the last 5-6 m  No increased pain  Feels that she is starting to go through menopause    - intermittent low back pain that may radiate to legs bilat  Started after retrieving a drone from a tree last year  No trauma  Worse with laying flat, better with movign around and maybe with sitting up  No change in bowel or bladder         The following portions of the patient's history were reviewed and updated as appropriate:   She  has no past medical history on file  She   Patient Active Problem List    Diagnosis Date Noted    Urticaria 03/29/2019    Moderate episode of recurrent major depressive disorder ([facility] [address] ) 01/29/2018    Anxiety 01/29/2018    Depression 02/26/2016    DDD (degenerative disc disease), lumbosacral 11/16/2015    Lumbar radiculitis 10/20/2015    Chronic bilateral low back pain with sciatica 10/20/2015    Essential (primary) hypertension 04/07/2015    Anxiety disorder 09/06/2012    Insomnia 08/28/2012     She  has no past surgical history on file  She  reports that she has been smoking cigarettes  She does not have any smokeless tobacco history on file  She reports that she does not drink alcohol  Her drug history is not on file  Current Outpatient Medications   Medication Sig Dispense Refill    cetirizine (ZyrTEC) 10 mg tablet Take 1 tablet (10 mg total) by mouth daily 30 tablet 1    predniSONE 20 mg tablet 2 tab qd x 4 then 1 qd x 4 12 tablet 0    ranitidine (ZANTAC) 300 MG capsule Take 1 capsule (300 mg total) by mouth every evening 30 capsule 1    temazepam (RESTORIL) 30 mg capsule Take 2 capsules at bedtime 60 capsule 0     No current facility-administered medications for this visit  She is allergic to eggs or egg-derived products and zolpidem       Review of Systems   Constitutional: Negative  HENT: Negative  Eyes: Negative  Respiratory: Negative  Cardiovascular: Negative  Gastrointestinal: Negative  Genitourinary: Negative  Musculoskeletal: Positive for arthralgias and back pain  Negative for gait problem, joint swelling and myalgias  Skin: Positive for rash  Allergic/Immunologic: Negative  Neurological: Negative for dizziness, facial asymmetry, weakness, light-headedness, numbness and headaches  Hematological: Negative  Psychiatric/Behavioral: Positive for sleep disturbance   Negative for dysphoric mood  The patient is nervous/anxious  Objective:      /84 (BP Location: Left arm, Patient Position: Sitting, Cuff Size: Standard)   Pulse 68   Temp 98 4 °F (36 9 °C)   Ht 5' 7" (1 702 m)   Wt 68 7 kg (151 lb 6 4 oz)   BMI 23 71 kg/m²          Physical Exam   Constitutional: She is oriented to person, place, and time  She appears well-developed and well-nourished  HENT:   Head: Normocephalic and atraumatic  Right Ear: External ear normal    Left Ear: External ear normal    Nose: Nose normal    Mouth/Throat: Oropharynx is clear and moist    Eyes: Pupils are equal, round, and reactive to light  Conjunctivae and EOM are normal    Neck: Normal range of motion  Neck supple  No thyromegaly present  Cardiovascular: Normal rate and regular rhythm  Pulmonary/Chest: Effort normal and breath sounds normal    Abdominal: Soft  Bowel sounds are normal  She exhibits no distension  There is no tenderness  Musculoskeletal:        Lumbar back: She exhibits tenderness (over low back and R SI joint) and spasm (mild over R SI joint)  She exhibits no edema  Back:    Neurological: She is alert and oriented to person, place, and time  She displays normal reflexes  No cranial nerve deficit or sensory deficit  Skin: Skin is warm  Capillary refill takes less than 2 seconds  No erythema  One of 3 "scratches" showed mild dermatographism   Psychiatric: Her mood appears anxious  Her affect is not labile and not inappropriate  Her speech is not rapid and/or pressured, not delayed, not tangential and not slurred  She is agitated  She is not aggressive, not hyperactive, not slowed, not withdrawn, not actively hallucinating and not combative  Thought content is not paranoid and not delusional  She does not express impulsivity or inappropriate judgment  She does not exhibit a depressed mood  She expresses no homicidal and no suicidal ideation  She is communicative

## 2019-03-28 LAB
ALLERGEN COMMENT: NORMAL
ALMOND IGE QN: <0.1 KUA/I
CASHEW NUT IGE QN: <0.1 KUA/I
CODFISH IGE QN: <0.1 KUA/I
EGG WHITE IGE QN: <0.1 KUA/I
GLUTEN IGE QN: <0.1 KUA/I
HAZELNUT IGE QN: <0.1 KUA/L
MILK IGE QN: <0.1 KUA/I
PEANUT IGE QN: <0.1 KUA/I
SALMON IGE QN: <0.1 KUA/I
SCALLOP IGE QN: <0.1 KUA/L
SESAME SEED IGE QN: <0.1 KUA/I
SHRIMP IGE QN: <0.1 KUA/L
SOYBEAN IGE QN: <0.1 KUA/I
TOTAL IGE SMQN RAST: 30.2 KU/L (ref 0–113)
TUNA IGE QN: <0.1 KUA/I
WALNUT IGE QN: <0.1 KUA/I
WHEAT IGE QN: <0.1 KUA/I

## 2019-03-29 ENCOUNTER — TELEPHONE (OUTPATIENT)
Dept: FAMILY MEDICINE CLINIC | Facility: CLINIC | Age: 49
End: 2019-03-29

## 2019-03-29 DIAGNOSIS — R89.9 ABNORMAL LABORATORY TEST RESULT: Primary | ICD-10-CM

## 2019-03-29 DIAGNOSIS — J01.00 ACUTE NON-RECURRENT MAXILLARY SINUSITIS: Primary | ICD-10-CM

## 2019-03-29 PROBLEM — L50.9 URTICARIA: Status: ACTIVE | Noted: 2019-03-29

## 2019-03-29 LAB
A ALTERNATA IGE QN: <0.1 KUA/I
A FUMIGATUS IGE QN: <0.1 KUA/I
ALLERGEN COMMENT: NORMAL
BERMUDA GRASS IGE QN: <0.1 KUA/I
BOXELDER IGE QN: <0.1 KUA/I
C HERBARUM IGE QN: <0.1 KUA/I
CAT DANDER IGE QN: <0.1 KUA/I
CMN PIGWEED IGE QN: <0.1 KUA/I
COMMON RAGWEED IGE QN: <0.1 KUA/I
COTTONWOOD IGE QN: <0.1 KUA/I
D FARINAE IGE QN: <0.1 KUA/I
D PTERONYSS IGE QN: <0.1 KUA/I
DOG DANDER IGE QN: <0.1 KUA/I
LONDON PLANE IGE QN: <0.1 KUA/I
MOUSE URINE PROT IGE QN: <0.1 KUA/I
MT JUNIPER IGE QN: <0.1 KUA/I
MUGWORT IGE QN: <0.1 KUA/I
P NOTATUM IGE QN: <0.1 KUA/I
ROACH IGE QN: <0.1 KUA/I
SHEEP SORREL IGE QN: <0.1 KUA/I
SILVER BIRCH IGE QN: <0.1 KUA/I
TIMOTHY IGE QN: <0.1 KUA/I
TOTAL IGE SMQN RAST: 33.4 KU/L (ref 0–113)
WALNUT IGE QN: <0.1 KUA/I
WHITE ASH IGE QN: <0.1 KUA/I
WHITE ELM IGE QN: <0.1 KUA/I
WHITE MULBERRY IGE QN: <0.1 KUA/I
WHITE OAK IGE QN: <0.1 KUA/I

## 2019-03-29 RX ORDER — AMOXICILLIN 875 MG/1
875 TABLET, COATED ORAL 2 TIMES DAILY
Qty: 20 TABLET | Refills: 0 | Status: SHIPPED | OUTPATIENT
Start: 2019-03-29 | End: 2019-04-08

## 2019-03-29 NOTE — TELEPHONE ENCOUNTER
Pt states that she was suppose to call on Monday if not feeling well but is not feeling any better and would like to start something now if you think something should be called in  She states that she is having pain behind eyes, sinus pressure, nasal drip, gum pain, and congestion  No fever   Please send something to [address] [place] [address] if appropriate

## 2019-03-29 NOTE — ASSESSMENT & PLAN NOTE
? Cause  Check allergy panels  Start pred taper  Add zyrtec and ranitidine   Recheck 2 weeks if not improved

## 2019-04-04 ENCOUNTER — TELEPHONE (OUTPATIENT)
Dept: FAMILY MEDICINE CLINIC | Facility: CLINIC | Age: 49
End: 2019-04-04

## 2019-04-04 DIAGNOSIS — F51.01 PRIMARY INSOMNIA: ICD-10-CM

## 2019-04-04 RX ORDER — TEMAZEPAM 30 MG/1
CAPSULE ORAL
Qty: 60 CAPSULE | Refills: 0 | Status: SHIPPED | OUTPATIENT
Start: 2019-04-04 | End: 2019-05-03 | Stop reason: SDUPTHER

## 2019-04-05 ENCOUNTER — TELEPHONE (OUTPATIENT)
Dept: FAMILY MEDICINE CLINIC | Facility: CLINIC | Age: 49
End: 2019-04-05

## 2019-04-05 ENCOUNTER — OFFICE VISIT (OUTPATIENT)
Dept: FAMILY MEDICINE CLINIC | Facility: CLINIC | Age: 49
End: 2019-04-05
Payer: COMMERCIAL

## 2019-04-05 VITALS
HEIGHT: 67 IN | SYSTOLIC BLOOD PRESSURE: 130 MMHG | WEIGHT: 152.4 LBS | TEMPERATURE: 98.2 F | HEART RATE: 72 BPM | BODY MASS INDEX: 23.92 KG/M2 | DIASTOLIC BLOOD PRESSURE: 92 MMHG

## 2019-04-05 DIAGNOSIS — R07.9 CHEST PAIN, UNSPECIFIED TYPE: Primary | ICD-10-CM

## 2019-04-05 DIAGNOSIS — L50.9 URTICARIA: ICD-10-CM

## 2019-04-05 PROCEDURE — 93000 ELECTROCARDIOGRAM COMPLETE: CPT | Performed by: FAMILY MEDICINE

## 2019-04-05 PROCEDURE — 99214 OFFICE O/P EST MOD 30 MIN: CPT | Performed by: FAMILY MEDICINE

## 2019-04-05 PROCEDURE — 3008F BODY MASS INDEX DOCD: CPT | Performed by: FAMILY MEDICINE

## 2019-04-08 ENCOUNTER — APPOINTMENT (OUTPATIENT)
Dept: LAB | Age: 49
End: 2019-04-08
Payer: COMMERCIAL

## 2019-04-08 DIAGNOSIS — R89.9 ABNORMAL LABORATORY TEST RESULT: ICD-10-CM

## 2019-04-08 PROBLEM — R07.9 CHEST PAIN: Status: ACTIVE | Noted: 2019-04-08

## 2019-04-08 LAB
BASOPHILS # BLD AUTO: 0.04 THOUSANDS/ΜL (ref 0–0.1)
BASOPHILS NFR BLD AUTO: 0 % (ref 0–1)
EOSINOPHIL # BLD AUTO: 0.17 THOUSAND/ΜL (ref 0–0.61)
EOSINOPHIL NFR BLD AUTO: 2 % (ref 0–6)
ERYTHROCYTE [DISTWIDTH] IN BLOOD BY AUTOMATED COUNT: 13.1 % (ref 11.6–15.1)
HCT VFR BLD AUTO: 43.6 % (ref 34.8–46.1)
HGB BLD-MCNC: 14.3 G/DL (ref 11.5–15.4)
IMM GRANULOCYTES # BLD AUTO: 0.05 THOUSAND/UL (ref 0–0.2)
IMM GRANULOCYTES NFR BLD AUTO: 1 % (ref 0–2)
LYMPHOCYTES # BLD AUTO: 2.65 THOUSANDS/ΜL (ref 0.6–4.47)
LYMPHOCYTES NFR BLD AUTO: 25 % (ref 14–44)
MCH RBC QN AUTO: 31.3 PG (ref 26.8–34.3)
MCHC RBC AUTO-ENTMCNC: 32.8 G/DL (ref 31.4–37.4)
MCV RBC AUTO: 95 FL (ref 82–98)
MONOCYTES # BLD AUTO: 0.7 THOUSAND/ΜL (ref 0.17–1.22)
MONOCYTES NFR BLD AUTO: 7 % (ref 4–12)
NEUTROPHILS # BLD AUTO: 7 THOUSANDS/ΜL (ref 1.85–7.62)
NEUTS SEG NFR BLD AUTO: 65 % (ref 43–75)
NRBC BLD AUTO-RTO: 0 /100 WBCS
PLATELET # BLD AUTO: 286 THOUSANDS/UL (ref 149–390)
PMV BLD AUTO: 10.3 FL (ref 8.9–12.7)
RBC # BLD AUTO: 4.57 MILLION/UL (ref 3.81–5.12)
WBC # BLD AUTO: 10.61 THOUSAND/UL (ref 4.31–10.16)

## 2019-04-08 PROCEDURE — 85025 COMPLETE CBC W/AUTO DIFF WBC: CPT

## 2019-04-08 PROCEDURE — 36415 COLL VENOUS BLD VENIPUNCTURE: CPT

## 2019-05-03 DIAGNOSIS — F51.01 PRIMARY INSOMNIA: ICD-10-CM

## 2019-05-03 RX ORDER — TEMAZEPAM 30 MG/1
CAPSULE ORAL
Qty: 60 CAPSULE | Refills: 0 | Status: SHIPPED | OUTPATIENT
Start: 2019-05-03 | End: 2019-06-12 | Stop reason: SDUPTHER

## 2019-05-06 ENCOUNTER — TELEPHONE (OUTPATIENT)
Dept: FAMILY MEDICINE CLINIC | Facility: CLINIC | Age: 49
End: 2019-05-06

## 2019-05-23 DIAGNOSIS — L50.9 URTICARIA: ICD-10-CM

## 2019-05-23 RX ORDER — RANITIDINE 300 MG/1
CAPSULE ORAL
Qty: 30 CAPSULE | Refills: 0 | Status: SHIPPED | OUTPATIENT
Start: 2019-05-23 | End: 2019-09-13 | Stop reason: ALTCHOICE

## 2019-06-12 ENCOUNTER — TELEPHONE (OUTPATIENT)
Dept: FAMILY MEDICINE CLINIC | Facility: CLINIC | Age: 49
End: 2019-06-12

## 2019-06-12 DIAGNOSIS — F51.01 PRIMARY INSOMNIA: ICD-10-CM

## 2019-06-13 ENCOUNTER — TELEPHONE (OUTPATIENT)
Dept: FAMILY MEDICINE CLINIC | Facility: CLINIC | Age: 49
End: 2019-06-13

## 2019-06-13 RX ORDER — TEMAZEPAM 30 MG/1
CAPSULE ORAL
Qty: 60 CAPSULE | Refills: 0 | Status: SHIPPED | OUTPATIENT
Start: 2019-06-13 | End: 2019-07-22 | Stop reason: SDUPTHER

## 2019-07-22 ENCOUNTER — TELEPHONE (OUTPATIENT)
Dept: FAMILY MEDICINE CLINIC | Facility: CLINIC | Age: 49
End: 2019-07-22

## 2019-07-22 DIAGNOSIS — F51.01 PRIMARY INSOMNIA: ICD-10-CM

## 2019-07-22 RX ORDER — TEMAZEPAM 30 MG/1
CAPSULE ORAL
Qty: 60 CAPSULE | Refills: 0 | Status: SHIPPED | OUTPATIENT
Start: 2019-07-22 | End: 2019-08-27 | Stop reason: SDUPTHER

## 2019-08-27 DIAGNOSIS — F51.01 PRIMARY INSOMNIA: ICD-10-CM

## 2019-08-27 RX ORDER — TEMAZEPAM 30 MG/1
CAPSULE ORAL
Qty: 60 CAPSULE | Refills: 0 | Status: SHIPPED | OUTPATIENT
Start: 2019-08-27 | End: 2019-09-27 | Stop reason: SDUPTHER

## 2019-08-27 RX ORDER — TEMAZEPAM 30 MG/1
CAPSULE ORAL
Qty: 60 CAPSULE | Refills: 0 | Status: SHIPPED | OUTPATIENT
Start: 2019-08-27 | End: 2019-08-27 | Stop reason: SDUPTHER

## 2019-08-28 ENCOUNTER — TELEPHONE (OUTPATIENT)
Dept: FAMILY MEDICINE CLINIC | Facility: CLINIC | Age: 49
End: 2019-08-28

## 2019-09-13 ENCOUNTER — OFFICE VISIT (OUTPATIENT)
Dept: FAMILY MEDICINE CLINIC | Facility: CLINIC | Age: 49
End: 2019-09-13
Payer: COMMERCIAL

## 2019-09-13 VITALS
DIASTOLIC BLOOD PRESSURE: 82 MMHG | BODY MASS INDEX: 23.64 KG/M2 | HEIGHT: 67 IN | HEART RATE: 74 BPM | SYSTOLIC BLOOD PRESSURE: 124 MMHG | TEMPERATURE: 98.2 F | WEIGHT: 150.6 LBS

## 2019-09-13 DIAGNOSIS — R10.13 EPIGASTRIC PAIN: ICD-10-CM

## 2019-09-13 DIAGNOSIS — L50.9 URTICARIA: Primary | ICD-10-CM

## 2019-09-13 DIAGNOSIS — J01.00 ACUTE NON-RECURRENT MAXILLARY SINUSITIS: ICD-10-CM

## 2019-09-13 PROCEDURE — 99214 OFFICE O/P EST MOD 30 MIN: CPT | Performed by: FAMILY MEDICINE

## 2019-09-13 RX ORDER — CETIRIZINE HYDROCHLORIDE 10 MG/1
10 TABLET ORAL DAILY
Qty: 30 TABLET | Refills: 2 | Status: SHIPPED | OUTPATIENT
Start: 2019-09-13 | End: 2019-10-28 | Stop reason: SDUPTHER

## 2019-09-13 RX ORDER — AMOXICILLIN 875 MG/1
875 TABLET, COATED ORAL 2 TIMES DAILY
Qty: 20 TABLET | Refills: 0 | Status: SHIPPED | OUTPATIENT
Start: 2019-09-13 | End: 2019-09-23

## 2019-09-13 RX ORDER — FAMOTIDINE 40 MG/1
40 TABLET, FILM COATED ORAL DAILY
Qty: 30 TABLET | Refills: 2 | Status: SHIPPED | OUTPATIENT
Start: 2019-09-13 | End: 2019-11-13 | Stop reason: ALTCHOICE

## 2019-09-13 NOTE — PROGRESS NOTES
Assessment/Plan:     Diagnoses and all orders for this visit:    Urticaria  -     famotidine (PEPCID) 40 MG tablet; Take 1 tablet (40 mg total) by mouth daily  -     cetirizine (ZyrTEC) 10 mg tablet; Take 1 tablet (10 mg total) by mouth daily    Acute non-recurrent maxillary sinusitis  -     amoxicillin (AMOXIL) 875 mg tablet; Take 1 tablet (875 mg total) by mouth 2 (two) times a day for 10 days    Epigastric pain  -     famotidine (PEPCID) 40 MG tablet; Take 1 tablet (40 mg total) by mouth daily        Discussion:  I reviewed with patient  Unclear cause of her urticarial exacerbation  Patient does have acute maxillary sinusitis  Epigastric discomfort may be related to GERD or gastritis  I discussed treatment with patient  We will restart her cetirizine 10 mg a day  Change Zantac to Pepcid which is more affordable using  the good Rx giovanna  Start amoxicillin for her sinusitis  Recheck 1 week if not resolving-earlier if worse  Patient call for problems or concerns in the interim      Subjective:      Patient ID: Salomon Miller is a 51 y o  female     -71-year-old female with a history of urticaria presents with a 1 week history of recurrent rash  Patient notes that she started having pruritic discomfort over lower back but soon developed into a rash that spread across her back, around her abdomen and on her extremities  Patient has been having sinus pain and increased congestion over the last 7-10 days  She also notes increased stress as well as the onset of her menstrual cycle  Rash has improved over last 24 hours and she does not have any lesions today  She has not been taking the cetirizine or the ranitidine regularly  Patient states that the ranitidine was 55 dollars a month which she cannot afford at this time  She denies any worsening shortness of breath or cough  There has been no facial swelling, difficulty swallowing or other respiratory/or pharyngeal symptoms    Patient has been having mild epigastric discomfort and some reflux symptoms  She denies any changes in bowel movements  The following portions of the patient's history were reviewed and updated as appropriate: She  has no past medical history on file  She   Patient Active Problem List    Diagnosis Date Noted    Chest pain 04/08/2019    Urticaria 03/29/2019    Moderate episode of recurrent major depressive disorder ([facility] [address] ) 01/29/2018    Anxiety 01/29/2018    Depression 02/26/2016    DDD (degenerative disc disease), lumbosacral 11/16/2015    Lumbar radiculitis 10/20/2015    Chronic bilateral low back pain with sciatica 10/20/2015    Essential (primary) hypertension 04/07/2015    Anxiety disorder 09/06/2012    Insomnia 08/28/2012     She  has no past surgical history on file  She  reports that she has been smoking cigarettes  She has never used smokeless tobacco  She reports that she does not drink alcohol  Her drug history is not on file  Current Outpatient Medications   Medication Sig Dispense Refill    temazepam (RESTORIL) 30 mg capsule TAKE 2 CAPSULES BY MOUTH AT BEDTIME 60 capsule 0    amoxicillin (AMOXIL) 875 mg tablet Take 1 tablet (875 mg total) by mouth 2 (two) times a day for 10 days 20 tablet 0    cetirizine (ZyrTEC) 10 mg tablet Take 1 tablet (10 mg total) by mouth daily 30 tablet 2    famotidine (PEPCID) 40 MG tablet Take 1 tablet (40 mg total) by mouth daily 30 tablet 2     No current facility-administered medications for this visit  She is allergic to eggs or egg-derived products and zolpidem       Review of Systems   Constitutional: Positive for fatigue  HENT: Positive for congestion, sinus pressure and sinus pain  Negative for ear discharge, ear pain, facial swelling, sore throat and trouble swallowing  Eyes: Negative  Respiratory: Positive for cough (Occasional)  Negative for shortness of breath  Cardiovascular: Negative  Skin: Positive for rash  Negative for wound     Neurological: Negative for weakness and numbness  Objective:      /82 (BP Location: Left arm, Patient Position: Sitting, Cuff Size: Standard)   Pulse 74   Temp 98 2 °F (36 8 °C)   Ht 5' 7" (1 702 m)   Wt 68 3 kg (150 lb 9 6 oz)   BMI 23 59 kg/m²          Physical Exam   Constitutional: She appears well-developed and well-nourished  No distress  HENT:   Head: Normocephalic and atraumatic  Right Ear: Hearing, tympanic membrane, external ear and ear canal normal    Left Ear: Hearing, tympanic membrane, external ear and ear canal normal    Nose: Mucosal edema present  No rhinorrhea  Right sinus exhibits maxillary sinus tenderness  Right sinus exhibits no frontal sinus tenderness  Left sinus exhibits maxillary sinus tenderness  Left sinus exhibits no frontal sinus tenderness  Mouth/Throat: Uvula is midline, oropharynx is clear and moist and mucous membranes are normal    Eyes: Pupils are equal, round, and reactive to light  Conjunctivae and EOM are normal    Neck: Normal range of motion  No thyromegaly present  Cardiovascular: Normal rate and intact distal pulses  Pulmonary/Chest: Effort normal and breath sounds normal    Abdominal: Soft  Bowel sounds are normal    Lymphadenopathy:     She has no cervical adenopathy  Skin: Skin is warm and dry  She is not diaphoretic     No urticaria appreciated

## 2019-09-27 DIAGNOSIS — F51.01 PRIMARY INSOMNIA: ICD-10-CM

## 2019-09-27 RX ORDER — TEMAZEPAM 30 MG/1
CAPSULE ORAL
Qty: 60 CAPSULE | Refills: 0 | Status: SHIPPED | OUTPATIENT
Start: 2019-09-27 | End: 2019-10-28 | Stop reason: SDUPTHER

## 2019-09-30 DIAGNOSIS — F51.01 PRIMARY INSOMNIA: ICD-10-CM

## 2019-10-04 ENCOUNTER — TELEPHONE (OUTPATIENT)
Dept: FAMILY MEDICINE CLINIC | Facility: CLINIC | Age: 49
End: 2019-10-04

## 2019-10-04 NOTE — TELEPHONE ENCOUNTER
Wants to talk to you about getting testing done, her rash keeps coming back and thinks its stress related, she is taking the meds you gave    Hard to get info from her,  Pl call

## 2019-10-24 ENCOUNTER — OFFICE VISIT (OUTPATIENT)
Dept: FAMILY MEDICINE CLINIC | Facility: CLINIC | Age: 49
End: 2019-10-24
Payer: COMMERCIAL

## 2019-10-24 VITALS
DIASTOLIC BLOOD PRESSURE: 80 MMHG | HEIGHT: 67 IN | WEIGHT: 145 LBS | BODY MASS INDEX: 22.76 KG/M2 | SYSTOLIC BLOOD PRESSURE: 124 MMHG | TEMPERATURE: 98.1 F | HEART RATE: 72 BPM

## 2019-10-24 DIAGNOSIS — Z12.39 SCREENING FOR BREAST CANCER: ICD-10-CM

## 2019-10-24 DIAGNOSIS — F41.1 GENERALIZED ANXIETY DISORDER: ICD-10-CM

## 2019-10-24 DIAGNOSIS — N92.1 MENOMETRORRHAGIA: Primary | ICD-10-CM

## 2019-10-24 PROCEDURE — 99214 OFFICE O/P EST MOD 30 MIN: CPT | Performed by: NURSE PRACTITIONER

## 2019-10-24 RX ORDER — INDOMETHACIN 25 MG/1
25 CAPSULE ORAL 2 TIMES DAILY WITH MEALS
Qty: 60 CAPSULE | Refills: 0 | Status: SHIPPED | OUTPATIENT
Start: 2019-10-24 | End: 2019-11-07

## 2019-10-24 NOTE — PROGRESS NOTES
Assessment/Plan:     Diagnoses and all orders for this visit:    Menometrorrhagia  -     US pelvis complete non OB; Future  -     indomethacin (INDOCIN) 25 mg capsule; Take 1 capsule (25 mg total) by mouth 2 (two) times a day with meals    Generalized anxiety disorder    Screening for breast cancer  -     Mammo screening bilateral w cad; Future    #1 Menometrorrhagia  Discussed with patient plan to obtain pelvic ultrasound to evaluate for potential issue  Discussed with patient plan to treat with indomethacin to manage pain   #2 Generalized anxiety disorder  Discussed with patient her past treatments and potential treatment options  Discussed with patient possible use of venlafaxine at low dose to assist in management of perimenopausal symptoms  Discussed with patient will attempt to find resources available to assist with her family issues  #3  Screening for breast cancer  Discussed with patient plan to obtain mammogram so patient instructed on how to schedule  Patient instructed office will call to discuss result when available  Patient instructed to call for problems or concerns in the interim    Subjective:      Patient ID: Grant Lau is a 51 y o  female  51 y  o female presenting with menstrual issues  She reports having her menstrual cycle for 10 days  She reports that her menstrual cycle as been off for the past three to four months  She also reports that she is under a lot of stress in regards to issues with her son  She reports that she develops extreme pelvic and back pain related to her menstrual cycle along development of a scattered urticaria  She as been using the medications suggested to her in September for the rash with no impact on the rash noted  She expresses feeling of desertion related to  management of her son's issues related to the education system and feel there is no were to turn for help her help her son   She has a complex relationship with her own mother that relates back to how she is treated and how her son is being treated  She states that her PCP as attempted to prescribe medications for her to manage anxiety, depression and insomnia with her developing a physical reaction to the medications except for the temazepam that she is currently taking  She expresses a fear of trying other medication to assist in her anxiety management  When counseling discussed patient reports that she as tried to see people  She feels that there is nothing wrong with her son just the system is broken that she is dealing with to get him hope         Family History   Problem Relation Age of Onset    Hypertension Mother     Coronary artery disease Father      Social History     Socioeconomic History    Marital status: /Civil Union     Spouse name: Not on file    Number of children: Not on file    Years of education: Not on file    Highest education level: Not on file   Occupational History    Not on file   Social Needs    Financial resource strain: Not on file    Food insecurity:     Worry: Not on file     Inability: Not on file    Transportation needs:     Medical: Not on file     Non-medical: Not on file   Tobacco Use    Smoking status: Current Every Day Smoker     Types: Cigarettes    Smokeless tobacco: Never Used   Substance and Sexual Activity    Alcohol use: No    Drug use: Not on file    Sexual activity: Not on file   Lifestyle    Physical activity:     Days per week: Not on file     Minutes per session: Not on file    Stress: Not on file   Relationships    Social connections:     Talks on phone: Not on file     Gets together: Not on file     Attends Voodoo service: Not on file     Active member of club or organization: Not on file     Attends meetings of clubs or organizations: Not on file     Relationship status: Not on file    Intimate partner violence:     Fear of current or ex partner: Not on file     Emotionally abused: Not on file     Physically abused: Not on file Forced sexual activity: Not on file   Other Topics Concern    Not on file   Social History Narrative    Caffeine use    Daily coffee consumption     History reviewed  No pertinent past medical history  History reviewed  No pertinent surgical history  Allergies   Allergen Reactions    Eggs Or Egg-Derived Products Abdominal Pain    Zolpidem Confusion       Current Outpatient Medications:     cetirizine (ZyrTEC) 10 mg tablet, Take 1 tablet (10 mg total) by mouth daily, Disp: 30 tablet, Rfl: 2    famotidine (PEPCID) 40 MG tablet, Take 1 tablet (40 mg total) by mouth daily, Disp: 30 tablet, Rfl: 2    temazepam (RESTORIL) 30 mg capsule, TAKE 2 CAPSULES BY MOUTH AT BEDTIME, Disp: 60 capsule, Rfl: 0    indomethacin (INDOCIN) 25 mg capsule, Take 1 capsule (25 mg total) by mouth 2 (two) times a day with meals, Disp: 60 capsule, Rfl: 0    Review of Systems   Constitutional: Negative  HENT: Negative  Respiratory: Negative  Cardiovascular: Negative  Endocrine: Negative  Genitourinary: Positive for menstrual problem, pelvic pain and vaginal bleeding  Musculoskeletal: Negative  Skin: Positive for rash  Neurological: Negative  Psychiatric/Behavioral: Positive for agitation  The patient is nervous/anxious  Objective:    /80   Pulse 72   Temp 98 1 °F (36 7 °C)   Ht 5' 7" (1 702 m)   Wt 65 8 kg (145 lb)   BMI 22 71 kg/m² (Reviewed)     Physical Exam   Constitutional: She is oriented to person, place, and time  Vital signs are normal  She appears well-developed and well-nourished  She appears distressed  HENT:   Head: Normocephalic and atraumatic  Eyes: Pupils are equal, round, and reactive to light  Conjunctivae, EOM and lids are normal    Neck: Trachea normal  Neck supple  Cardiovascular: Normal rate, regular rhythm, normal heart sounds and intact distal pulses  Pulmonary/Chest: Effort normal and breath sounds normal    Abdominal: Soft   Bowel sounds are normal  She exhibits no distension  There is tenderness in the suprapubic area  There is no rigidity, no rebound and no guarding  Lymphadenopathy:     She has no cervical adenopathy  Neurological: She is alert and oriented to person, place, and time  Skin: Skin is warm and dry  Capillary refill takes less than 2 seconds  Rash noted  Rash is urticarial    Psychiatric: Judgment and thought content normal  Her mood appears anxious  Her speech is rapid and/or pressured  She is agitated  I have spent 40 minutes with Patient  today in which greater than 50% of this time was spent in counseling/coordination of care regarding Intructions for management

## 2019-10-25 ENCOUNTER — HOSPITAL ENCOUNTER (OUTPATIENT)
Dept: RADIOLOGY | Age: 49
Discharge: HOME | End: 2019-10-25
Payer: COMMERCIAL

## 2019-10-25 DIAGNOSIS — N92.1 MENOMETRORRHAGIA: ICD-10-CM

## 2019-10-25 PROCEDURE — 76830 TRANSVAGINAL US NON-OB: CPT

## 2019-10-25 PROCEDURE — 76856 US EXAM PELVIC COMPLETE: CPT

## 2019-10-28 ENCOUNTER — TELEPHONE (OUTPATIENT)
Dept: FAMILY MEDICINE CLINIC | Facility: CLINIC | Age: 49
End: 2019-10-28

## 2019-10-28 DIAGNOSIS — L50.9 URTICARIA: ICD-10-CM

## 2019-10-28 DIAGNOSIS — F51.01 PRIMARY INSOMNIA: ICD-10-CM

## 2019-10-29 RX ORDER — CETIRIZINE HYDROCHLORIDE 10 MG/1
10 TABLET ORAL DAILY
Qty: 30 TABLET | Refills: 0 | Status: SHIPPED | OUTPATIENT
Start: 2019-10-29 | End: 2019-12-18 | Stop reason: SDUPTHER

## 2019-10-29 RX ORDER — TEMAZEPAM 30 MG/1
CAPSULE ORAL
Qty: 60 CAPSULE | Refills: 0 | Status: SHIPPED | OUTPATIENT
Start: 2019-10-29 | End: 2019-12-06 | Stop reason: SDUPTHER

## 2019-10-29 NOTE — TELEPHONE ENCOUNTER
Patient called asking about results   I gave her the info   But she is asking that [address] call her today

## 2019-11-07 ENCOUNTER — OFFICE VISIT (OUTPATIENT)
Dept: OBGYN CLINIC | Facility: CLINIC | Age: 49
End: 2019-11-07
Payer: COMMERCIAL

## 2019-11-07 VITALS
SYSTOLIC BLOOD PRESSURE: 120 MMHG | BODY MASS INDEX: 21.22 KG/M2 | DIASTOLIC BLOOD PRESSURE: 70 MMHG | HEIGHT: 68 IN | WEIGHT: 140 LBS

## 2019-11-07 DIAGNOSIS — N95.1 PERIMENOPAUSAL SYMPTOMS: ICD-10-CM

## 2019-11-07 DIAGNOSIS — R93.89 ABNORMAL ULTRASOUND: ICD-10-CM

## 2019-11-07 DIAGNOSIS — N93.9 ABNORMAL UTERINE BLEEDING (AUB): Primary | ICD-10-CM

## 2019-11-07 LAB — SL AMB POCT URINE HCG: NEGATIVE

## 2019-11-07 PROCEDURE — 58100 BIOPSY OF UTERUS LINING: CPT | Performed by: OBSTETRICS & GYNECOLOGY

## 2019-11-07 PROCEDURE — 81025 URINE PREGNANCY TEST: CPT | Performed by: OBSTETRICS & GYNECOLOGY

## 2019-11-07 PROCEDURE — 99204 OFFICE O/P NEW MOD 45 MIN: CPT | Performed by: OBSTETRICS & GYNECOLOGY

## 2019-11-07 NOTE — PROGRESS NOTES
Endometrial biopsy  Date/Time: 11/7/2019 2:05 PM  Performed by: Lucy Anders MD  Authorized by: Lucy Anders MD     Consent:     Consent obtained:  Verbal and written    Consent given by:  Patient    Procedural risks discussed:  Bleeding, failure rate, infection and repeat procedure (uterine perforation, inadequate sampling)    Patient questions answered: yes      Patient agrees, verbalizes understanding, and wants to proceed: yes    Indication:     Indications:  Other disorder of menstruation and other abnormal bleeding from female genital tract      Indications comment:  Abnormal uterine bleeding  Procedure:     Procedure: endometrial biopsy with Pipelle      A bivalve speculum was placed in the vagina: yes      Cervix cleaned and prepped: yes      A paracervical block was performed: no      An intracervical block was performed: no      The cervix was dilated: no      Uterus sounded: yes      Uterus sound depth (cm):  10    Specimen collected: specimen collected and sent to pathology      Patient tolerated procedure well with no complications: yes    Findings:     Uterus size:  9-10 weeks    Cervix: normal      Adnexa: normal

## 2019-11-12 DIAGNOSIS — L50.9 URTICARIA: ICD-10-CM

## 2019-11-12 LAB
CLINICAL INFO: NORMAL
PROCEDURE TYPE: NORMAL
SPECIMEN SOURCE: NORMAL

## 2019-11-12 RX ORDER — RANITIDINE 300 MG/1
CAPSULE ORAL
Qty: 30 CAPSULE | Refills: 0 | Status: SHIPPED | OUTPATIENT
Start: 2019-11-12 | End: 2019-11-13 | Stop reason: ALTCHOICE

## 2019-11-13 ENCOUNTER — TELEPHONE (OUTPATIENT)
Dept: FAMILY MEDICINE CLINIC | Facility: CLINIC | Age: 49
End: 2019-11-13

## 2019-11-13 DIAGNOSIS — L50.9 URTICARIA: Primary | ICD-10-CM

## 2019-11-13 RX ORDER — RANITIDINE 300 MG/1
300 TABLET ORAL
Qty: 30 TABLET | Refills: 5 | Status: SHIPPED | OUTPATIENT
Start: 2019-11-13 | End: 2019-12-20 | Stop reason: ALTCHOICE

## 2019-11-19 NOTE — RESULT ENCOUNTER NOTE
Proliferative endometrium on biopsy  No notation of malignancy or endometrial hyperplasia  Discussed results with patient via phone call  Plan for discussion of treatment options at next visit as she is still having AUB

## 2019-11-20 DIAGNOSIS — N92.1 MENOMETRORRHAGIA: ICD-10-CM

## 2019-11-20 RX ORDER — INDOMETHACIN 25 MG/1
CAPSULE ORAL
Qty: 60 CAPSULE | Refills: 0 | OUTPATIENT
Start: 2019-11-20

## 2019-11-24 NOTE — PROGRESS NOTES
Assessment/Plan:  49yo with abnormal ultrasound showing uterine mass in lower uterine segment, contributing to her abnormal uterine bleeding  Pt also experiencing distressing perimenopausal symptoms  Abnormal uterine bleeding (AUB)  -     Endometrial biopsy  Patient to follow up for review of biopsy results  Discussed that biopsy is necessary to rule out uterine cancer and endometrial hyperplasia prior to discussion of treatment options for AUB  Abnormal ultrasound  -     Tissue Pathology  -     POCT urine HCG  -     Endometrial biopsy  Pelvic ultrasound notable for 5 3 cm mass in lower uterine segment with hypervascularity  Unable to distinguish if mass is submucosal vs involved in myometrium  Discussed that the abnormal uterine mass will contribute her abnormal bleeding if it is protruding into her endometrial cavity  Explained that treatment options can be discussed once endometrial biopsy returns with benign results  Treatment options can include hysteroscopy for visualization of mass and D&C with resection of mass if possible vs hysterectomy  Perimenopausal symptoms    - patient experiencing anger/rage mood swings recently  Reports that mood swings related to menses within the past 6 months  Reviewed perimenopausal symptoms and that symptoms may last for 1-5 years typically  Discussed that an SSRI may be beneficial as she is not having severe vasomotor symptoms  Patient would like to first evaluate her abnormal bleeding first and then consider treatments for her mood swings  Subjective:    Patient ID: Randal Cervantes is a 51 y o  female  Chief Complaint   Patient presents with    Follow-up     Pt here to establish care and for ER follow up  Pt is still have abnormal uterine bleeding with heavy clots   Pregnancy Test     negative      Patient is a 51yo  here as a new patient to establish care as well for ER follow up for abnormal uterine bleeding   She reports that over the past year her menstrual bleeding has become heavier, longer, and more frequent  An ultrasound was performed during evaluation and notable for a mass in the lower uterine segment measuring 5 3 cm with vascularity, possible polyp vs fibroid and unsure if there is submucosal involvement via imaging  The endometrial stripe was 6mm in the fundus and body of uterus  Left ovary has 5 9cm simple cyst      Patient also reports that she is experiencing intense mood swings related to her menses  She reports that her reactions are irrational and she has feelings of rage  She is concerned for the impact that her mood swings will have on her marriage and children  She is having intermittent hot flashes that are not "too bad" per the patient, but her mood swings are more concerning for her  The following portions of the patient's history were reviewed and updated as appropriate: allergies, current medications, past family history, past medical history, past social history, past surgical history and problem list     Review of Systems   Constitutional: Negative for activity change, appetite change, fatigue and unexpected weight change  Respiratory: Negative for shortness of breath  Cardiovascular: Negative for chest pain and palpitations  Gastrointestinal: Negative for abdominal pain, diarrhea, nausea and vomiting  Genitourinary: Positive for menstrual problem and vaginal bleeding  Negative for difficulty urinating, dyspareunia, flank pain, pelvic pain, vaginal discharge and vaginal pain  Musculoskeletal: Negative for back pain  Skin: Negative  Neurological: Positive for numbness and headaches  Negative for dizziness, weakness and light-headedness  Psychiatric/Behavioral: Positive for agitation (Mood swings related to menses, anger/rage reactions) and dysphoric mood         Objective:  /70   Ht 5' 7 5" (1 715 m)   Wt 63 5 kg (140 lb)   LMP 10/07/2019   BMI 21 60 kg/m²    Physical Exam   Constitutional: She is oriented to person, place, and time  She appears well-developed and well-nourished  No distress  HENT:   Head: Normocephalic and atraumatic  Cardiovascular: Normal rate  Pulmonary/Chest: Effort normal  No respiratory distress  Abdominal: Soft  She exhibits no mass  There is no tenderness  There is no guarding  Genitourinary: Uterus normal  There is no rash, tenderness or lesion on the right labia  There is no rash, tenderness or lesion on the left labia  Uterus is not enlarged, not fixed and not tender  Cervix exhibits no motion tenderness and no discharge  Right adnexum displays no mass, no tenderness and no fullness  Left adnexum displays no mass, no tenderness and no fullness  No erythema, tenderness or bleeding in the vagina  No vaginal discharge found  Musculoskeletal: Normal range of motion  She exhibits no edema  Lymphadenopathy: No inguinal adenopathy noted on the right or left side  Neurological: She is alert and oriented to person, place, and time  Skin: Skin is warm and dry  She is not diaphoretic  No erythema  Psychiatric: Her speech is normal  Judgment and thought content normal  She is agitated  Cognition and memory are normal  She exhibits a depressed mood  Nursing note and vitals reviewed

## 2019-12-06 DIAGNOSIS — F51.01 PRIMARY INSOMNIA: ICD-10-CM

## 2019-12-06 RX ORDER — TEMAZEPAM 30 MG/1
CAPSULE ORAL
Qty: 60 CAPSULE | Refills: 0 | Status: SHIPPED | OUTPATIENT
Start: 2019-12-06 | End: 2020-01-08 | Stop reason: SDUPTHER

## 2019-12-08 DIAGNOSIS — R10.13 EPIGASTRIC PAIN: ICD-10-CM

## 2019-12-08 DIAGNOSIS — L50.9 URTICARIA: ICD-10-CM

## 2019-12-08 RX ORDER — FAMOTIDINE 40 MG/1
TABLET, FILM COATED ORAL
Qty: 30 TABLET | Refills: 0 | OUTPATIENT
Start: 2019-12-08

## 2019-12-18 DIAGNOSIS — L50.9 URTICARIA: ICD-10-CM

## 2019-12-19 RX ORDER — CETIRIZINE HYDROCHLORIDE 10 MG/1
10 TABLET ORAL DAILY
Qty: 30 TABLET | Refills: 0 | Status: SHIPPED | OUTPATIENT
Start: 2019-12-19 | End: 2020-01-21 | Stop reason: SDUPTHER

## 2019-12-20 ENCOUNTER — TELEPHONE (OUTPATIENT)
Dept: FAMILY MEDICINE CLINIC | Facility: CLINIC | Age: 49
End: 2019-12-20

## 2019-12-20 DIAGNOSIS — K21.9 GASTROESOPHAGEAL REFLUX DISEASE, ESOPHAGITIS PRESENCE NOT SPECIFIED: Primary | ICD-10-CM

## 2019-12-20 RX ORDER — FAMOTIDINE 40 MG/1
40 TABLET, FILM COATED ORAL DAILY
Qty: 30 TABLET | Refills: 5 | Status: SHIPPED | OUTPATIENT
Start: 2019-12-20 | End: 2020-01-21 | Stop reason: SDUPTHER

## 2019-12-20 NOTE — TELEPHONE ENCOUNTER
Pt left message on refill line for refill of Famotidine 40 mg to be sent to [facility] ,but it is no longer on her medication list as taking

## 2019-12-26 ENCOUNTER — OFFICE VISIT (OUTPATIENT)
Dept: OBGYN CLINIC | Facility: CLINIC | Age: 49
End: 2019-12-26
Payer: COMMERCIAL

## 2019-12-26 VITALS — SYSTOLIC BLOOD PRESSURE: 122 MMHG | DIASTOLIC BLOOD PRESSURE: 68 MMHG | WEIGHT: 143.8 LBS | BODY MASS INDEX: 22.19 KG/M2

## 2019-12-26 DIAGNOSIS — R93.5 ABNORMAL ULTRASOUND OF UTERUS: ICD-10-CM

## 2019-12-26 DIAGNOSIS — N93.9 ABNORMAL UTERINE BLEEDING (AUB): Primary | ICD-10-CM

## 2019-12-26 PROCEDURE — 99214 OFFICE O/P EST MOD 30 MIN: CPT | Performed by: OBSTETRICS & GYNECOLOGY

## 2020-01-08 DIAGNOSIS — F51.01 PRIMARY INSOMNIA: ICD-10-CM

## 2020-01-08 RX ORDER — TEMAZEPAM 30 MG/1
CAPSULE ORAL
Qty: 60 CAPSULE | Refills: 0 | Status: SHIPPED | OUTPATIENT
Start: 2020-01-08 | End: 2020-02-14 | Stop reason: SDUPTHER

## 2020-01-09 ENCOUNTER — TELEPHONE (OUTPATIENT)
Dept: FAMILY MEDICINE CLINIC | Facility: CLINIC | Age: 50
End: 2020-01-09

## 2020-01-11 NOTE — PROGRESS NOTES
Assessment/Plan:  - surgical schedule to be reviewed  Anticipate hysterectomy in March/April  - patient to RTO for preoperative exam and to sign consents  Needs pelvic exam at that time to definitively determine route of hysterectomy  Planning for possible vaginal hysterectomy  Will plan to perform with Dr Bari Moise for back up  Diagnoses and all orders for this visit:    Abnormal uterine bleeding (AUB)  - reviewed endometrial biopsy results with notable proliferative endometrium on biopsy  No malignancy or hyperplasia found on biopsy  Abnormal ultrasound of uterus    -discussed lower uterine segment mass found on ultrasound  Discussed with endometrial biopsy result as benign, mass is likely contributing to her abnormal uterine bleeding at this time  -reviewed treatment options with recommendation for hysteroscopy and D&C to evaluate mass in the lower uterine segment  Reviewed that hysteroscopy would allow for visualization of this area however if mass is intramural would not allow for full evaluation  Patient inquired for definitive treatment via hysterectomy  Discussed that hysterectomy would definitively treat her abnormal uterine bleeding  Reviewed details of the hysterectomy with anticipation for minimally invasive gyn surgery via total laparoscopic hysterectomy or total vaginal hysterectomy  Recommended for removal of bilateral fallopian tubes, uterus, and cervix during procedure as well as recommendation to leave ovaries in place in order to avoid exacerbation of her menopausal symptoms  Reviewed with patient risks related to hysterectomy as well as anticipated extended recovery course for approximately 6-8 weeks as the procedure is much more involved in comparison to hysteroscopy and dilation and curettage  Patient verbalized understanding of all that was discussed including risks and benefits, and alternative surgical options      Greater than 50% of the 40 minutes visit was spent in face-to-face counseling coordination of care for patient's treatment options discussion of surgical planning  Subjective:    Patient ID: Charmayne Lout is a 47 y o  female  Chief Complaint   Patient presents with    Results     Pt here for EMB f/u  c/o back pain     Patient is a 58-year-old  presenting today for follow-up visit for endometrial biopsy result and discussion of treatment options for her abnormal uterine bleeding  Patient had endometrial biopsy performed on 2019 with results notable for proliferative endometrium and absence of malignancy and hyperplasia  These results were discussed with patient over the phone however recommendation was made for patient to be seen in office to discuss treatment options going forward  Reviewed with patient that she still has an abnormality in her lower uterine segment represented as a 5 cm mass that could possibly be a submucosal or intramural in location  Reviewed treatment options with recommendation to proceed to the operating room for at minimum hysteroscopy with dilation and curettage  Explained procedure to patient  At that time patient inquired about definitive treatment via hysterectomy as patient would like to avoid multiple surgeries and operations and desires definitive treatment for bleeding and pelvic/back pain  Patient states that she is still having uterine bleeding with passage of large clots  She also reports continued pelvic and back pain that she attributes and associates with passage of large clots  She is not passing clots each day but rather has intermittent pain and then passage of quarter to half dollar size clot occur  The following portions of the patient's history were reviewed and updated as appropriate: allergies, current medications, past family history, past medical history, past social history, past surgical history and problem list     Review of Systems   Respiratory: Negative for shortness of breath  Cardiovascular: Negative for chest pain  Gastrointestinal: Positive for abdominal pain  Negative for constipation, diarrhea, nausea and vomiting  Genitourinary: Positive for menstrual problem, pelvic pain and vaginal bleeding  Negative for flank pain, frequency, vaginal discharge and vaginal pain  Musculoskeletal: Positive for back pain  Neurological: Positive for headaches  Negative for dizziness, weakness and light-headedness  Psychiatric/Behavioral: Positive for agitation  Objective:  /68   Wt 65 2 kg (143 lb 12 8 oz)   BMI 22 19 kg/m²   Physical Exam   Constitutional: She is oriented to person, place, and time  She appears well-developed and well-nourished  No distress  HENT:   Head: Normocephalic and atraumatic  Cardiovascular: Normal rate  Pulmonary/Chest: Effort normal  No respiratory distress  Musculoskeletal: Normal range of motion  She exhibits no edema  Neurological: She is alert and oriented to person, place, and time  Skin: Skin is warm and dry  She is not diaphoretic  Psychiatric: She has a normal mood and affect  Her behavior is normal  Judgment and thought content normal    Nursing note and vitals reviewed

## 2020-01-15 ENCOUNTER — PREP FOR PROCEDURE (OUTPATIENT)
Dept: OBGYN CLINIC | Facility: CLINIC | Age: 50
End: 2020-01-15

## 2020-01-15 DIAGNOSIS — N93.9 ABNORMAL UTERINE BLEEDING (AUB): ICD-10-CM

## 2020-01-15 DIAGNOSIS — R93.5 ABNORMAL ULTRASOUND OF UTERUS: Primary | ICD-10-CM

## 2020-01-15 RX ORDER — GABAPENTIN 100 MG/1
100 CAPSULE ORAL ONCE
Status: CANCELLED | OUTPATIENT
Start: 2020-01-15 | End: 2020-01-15

## 2020-01-15 RX ORDER — PHENAZOPYRIDINE HYDROCHLORIDE 100 MG/1
100 TABLET, FILM COATED ORAL ONCE
Status: CANCELLED | OUTPATIENT
Start: 2020-01-15

## 2020-01-15 RX ORDER — ACETAMINOPHEN 325 MG/1
975 TABLET ORAL ONCE
Status: CANCELLED | OUTPATIENT
Start: 2020-01-15 | End: 2020-01-15

## 2020-01-15 RX ORDER — CELECOXIB 100 MG/1
200 CAPSULE ORAL ONCE
Status: CANCELLED | OUTPATIENT
Start: 2020-01-15 | End: 2020-01-15

## 2020-01-21 DIAGNOSIS — L50.9 URTICARIA: ICD-10-CM

## 2020-01-21 DIAGNOSIS — K21.9 GASTROESOPHAGEAL REFLUX DISEASE, ESOPHAGITIS PRESENCE NOT SPECIFIED: ICD-10-CM

## 2020-01-21 RX ORDER — FAMOTIDINE 40 MG/1
40 TABLET, FILM COATED ORAL DAILY
Qty: 30 TABLET | Refills: 0 | Status: SHIPPED | OUTPATIENT
Start: 2020-01-21 | End: 2020-02-18 | Stop reason: SDUPTHER

## 2020-01-21 RX ORDER — CETIRIZINE HYDROCHLORIDE 10 MG/1
10 TABLET ORAL DAILY
Qty: 30 TABLET | Refills: 0 | Status: SHIPPED | OUTPATIENT
Start: 2020-01-21 | End: 2020-02-18 | Stop reason: SDUPTHER

## 2020-02-14 DIAGNOSIS — F51.01 PRIMARY INSOMNIA: ICD-10-CM

## 2020-02-16 RX ORDER — TEMAZEPAM 30 MG/1
CAPSULE ORAL
Qty: 60 CAPSULE | Refills: 0 | Status: SHIPPED | OUTPATIENT
Start: 2020-02-16 | End: 2020-03-15 | Stop reason: SDUPTHER

## 2020-02-18 ENCOUNTER — TELEPHONE (OUTPATIENT)
Dept: OBGYN CLINIC | Facility: CLINIC | Age: 50
End: 2020-02-18

## 2020-02-18 DIAGNOSIS — L50.9 URTICARIA: ICD-10-CM

## 2020-02-18 DIAGNOSIS — K21.9 GASTROESOPHAGEAL REFLUX DISEASE, ESOPHAGITIS PRESENCE NOT SPECIFIED: ICD-10-CM

## 2020-02-18 RX ORDER — FAMOTIDINE 40 MG/1
40 TABLET, FILM COATED ORAL DAILY
Qty: 30 TABLET | Refills: 5 | Status: SHIPPED | OUTPATIENT
Start: 2020-02-18 | End: 2020-03-15 | Stop reason: SDUPTHER

## 2020-02-18 RX ORDER — CETIRIZINE HYDROCHLORIDE 10 MG/1
10 TABLET ORAL DAILY
Qty: 30 TABLET | Refills: 0 | Status: SHIPPED | OUTPATIENT
Start: 2020-02-18 | End: 2020-03-15 | Stop reason: SDUPTHER

## 2020-02-18 NOTE — TELEPHONE ENCOUNTER
Called pts insurance on file [facility])  to see if pt needs prior auth for surgery on 04/13/20               NO ACTIVE COVERAGE     Called pt to discuss  Pt switched insurance   currently has the cards  When pt gets her card from her  she will call the office and give us the insurance name, her ID # and the insurance phone # so we can check to see if they require PA  My name and office number was given to pt, pt verbalized understanding

## 2020-03-05 ENCOUNTER — TELEPHONE (OUTPATIENT)
Dept: OBGYN CLINIC | Facility: CLINIC | Age: 50
End: 2020-03-05

## 2020-03-05 NOTE — TELEPHONE ENCOUNTER
Patient called to discuss surgery  At this time patient is feeling very overwhelmed and is feeling that a major surgical procedure is not feasible  Reviewed that if surgery is not performed she is likely to continue having abnormal bleeding which may be contributed to by the 5cm mass in her lower uterine segment  Reviewed that her EMB noted proliferative endometrium but no atypia or malignancy  Discussed that her bleeding can also be addressed by hormones  At this time patient feels bleeding is manageable  Would like to cancel her appointments and surgery at this time  Patient encouraged to call office for appointment if she would like to revisit her bleeding and other treatment options and to return for an annual gyn visit this coming fall  [Worsening] : worsening [___ mths] : [unfilled] month(s) ago [9] : a maximum pain level of 9/10 [Intermit.] : ~He/She~ states the symptoms seem to be intermittent [Bending] : worsened by bending [Walking] : worsened by walking [Knee Flexion] : worsened with knee flexion [de-identified] : dangling [de-identified] : Pt Presents for initial evaluation with pain in her right knee. Pt was running at beach Oct 2019, felt pain. Was seen Sy Link cortisone injection given, pain returned in Nov, had a series of gel injections started 12/16/19, was not completed because of pain.  MRI done 12/28/2019. Pt was put on Prednisone. Pt thinks she "was misdiagnosed" Pt was starting physical therapy. Pt is wearing elastic stockings. for "lymph edema"

## 2020-03-05 NOTE — TELEPHONE ENCOUNTER
Questioning, the surgery, wants to know how important the surgery is  Has a lot going on right now, feeling very stressed  Please call her back, ok to leave a message as well

## 2020-03-15 DIAGNOSIS — L50.9 URTICARIA: ICD-10-CM

## 2020-03-15 DIAGNOSIS — K21.9 GASTROESOPHAGEAL REFLUX DISEASE, ESOPHAGITIS PRESENCE NOT SPECIFIED: ICD-10-CM

## 2020-03-15 DIAGNOSIS — F51.01 PRIMARY INSOMNIA: ICD-10-CM

## 2020-03-16 RX ORDER — CETIRIZINE HYDROCHLORIDE 10 MG/1
10 TABLET ORAL DAILY
Qty: 90 TABLET | Refills: 1 | Status: SHIPPED | OUTPATIENT
Start: 2020-03-16 | End: 2020-07-02 | Stop reason: SDUPTHER

## 2020-03-16 RX ORDER — TEMAZEPAM 30 MG/1
CAPSULE ORAL
Qty: 60 CAPSULE | Refills: 0 | Status: SHIPPED | OUTPATIENT
Start: 2020-03-16 | End: 2020-04-16 | Stop reason: SDUPTHER

## 2020-03-16 RX ORDER — FAMOTIDINE 40 MG/1
40 TABLET, FILM COATED ORAL DAILY
Qty: 90 TABLET | Refills: 1 | Status: SHIPPED | OUTPATIENT
Start: 2020-03-16 | End: 2021-01-04 | Stop reason: SDUPTHER

## 2020-04-16 DIAGNOSIS — F51.01 PRIMARY INSOMNIA: ICD-10-CM

## 2020-04-16 RX ORDER — TEMAZEPAM 30 MG/1
CAPSULE ORAL
Qty: 60 CAPSULE | Refills: 0 | Status: SHIPPED | OUTPATIENT
Start: 2020-04-16 | End: 2020-05-24 | Stop reason: SDUPTHER

## 2020-04-20 ENCOUNTER — TELEPHONE (OUTPATIENT)
Dept: FAMILY MEDICINE CLINIC | Facility: CLINIC | Age: 50
End: 2020-04-20

## 2020-04-22 ENCOUNTER — TELEPHONE (OUTPATIENT)
Dept: OBGYN CLINIC | Facility: CLINIC | Age: 50
End: 2020-04-22

## 2020-04-22 DIAGNOSIS — N93.9 ABNORMAL UTERINE BLEEDING (AUB): Primary | ICD-10-CM

## 2020-04-23 ENCOUNTER — LAB (OUTPATIENT)
Dept: LAB | Age: 50
End: 2020-04-23
Payer: COMMERCIAL

## 2020-04-23 DIAGNOSIS — R93.5 ABNORMAL ULTRASOUND OF UTERUS: ICD-10-CM

## 2020-04-23 DIAGNOSIS — N93.9 ABNORMAL UTERINE BLEEDING (AUB): ICD-10-CM

## 2020-04-23 LAB
ABO GROUP BLD: NORMAL
ALBUMIN SERPL BCP-MCNC: 3.4 G/DL (ref 3.5–5)
ALP SERPL-CCNC: 61 U/L (ref 46–116)
ALT SERPL W P-5'-P-CCNC: 16 U/L (ref 12–78)
ANION GAP SERPL CALCULATED.3IONS-SCNC: 5 MMOL/L (ref 4–13)
AST SERPL W P-5'-P-CCNC: 6 U/L (ref 5–45)
BASOPHILS # BLD AUTO: 0.07 THOUSANDS/ΜL (ref 0–0.1)
BASOPHILS NFR BLD AUTO: 1 % (ref 0–1)
BILIRUB SERPL-MCNC: 0.26 MG/DL (ref 0.2–1)
BLD GP AB SCN SERPL QL: NEGATIVE
BUN SERPL-MCNC: 11 MG/DL (ref 5–25)
CALCIUM SERPL-MCNC: 8.4 MG/DL (ref 8.3–10.1)
CHLORIDE SERPL-SCNC: 109 MMOL/L (ref 100–108)
CO2 SERPL-SCNC: 23 MMOL/L (ref 21–32)
CREAT SERPL-MCNC: 0.86 MG/DL (ref 0.6–1.3)
EOSINOPHIL # BLD AUTO: 0.22 THOUSAND/ΜL (ref 0–0.61)
EOSINOPHIL NFR BLD AUTO: 3 % (ref 0–6)
ERYTHROCYTE [DISTWIDTH] IN BLOOD BY AUTOMATED COUNT: 13.5 % (ref 11.6–15.1)
EST. AVERAGE GLUCOSE BLD GHB EST-MCNC: 108 MG/DL
GFR SERPL CREATININE-BSD FRML MDRD: 79 ML/MIN/1.73SQ M
GLUCOSE P FAST SERPL-MCNC: 130 MG/DL (ref 65–99)
HBA1C MFR BLD: 5.4 %
HCT VFR BLD AUTO: 34.3 % (ref 34.8–46.1)
HGB BLD-MCNC: 10.6 G/DL (ref 11.5–15.4)
IMM GRANULOCYTES # BLD AUTO: 0.01 THOUSAND/UL (ref 0–0.2)
IMM GRANULOCYTES NFR BLD AUTO: 0 % (ref 0–2)
LYMPHOCYTES # BLD AUTO: 1.96 THOUSANDS/ΜL (ref 0.6–4.47)
LYMPHOCYTES NFR BLD AUTO: 29 % (ref 14–44)
MCH RBC QN AUTO: 28.5 PG (ref 26.8–34.3)
MCHC RBC AUTO-ENTMCNC: 30.9 G/DL (ref 31.4–37.4)
MCV RBC AUTO: 92 FL (ref 82–98)
MONOCYTES # BLD AUTO: 0.58 THOUSAND/ΜL (ref 0.17–1.22)
MONOCYTES NFR BLD AUTO: 8 % (ref 4–12)
NEUTROPHILS # BLD AUTO: 4.03 THOUSANDS/ΜL (ref 1.85–7.62)
NEUTS SEG NFR BLD AUTO: 59 % (ref 43–75)
NRBC BLD AUTO-RTO: 0 /100 WBCS
PLATELET # BLD AUTO: 513 THOUSANDS/UL (ref 149–390)
PMV BLD AUTO: 10.1 FL (ref 8.9–12.7)
POTASSIUM SERPL-SCNC: 4.4 MMOL/L (ref 3.5–5.3)
PROT SERPL-MCNC: 6.9 G/DL (ref 6.4–8.2)
RBC # BLD AUTO: 3.72 MILLION/UL (ref 3.81–5.12)
RH BLD: POSITIVE
SODIUM SERPL-SCNC: 137 MMOL/L (ref 136–145)
SPECIMEN EXPIRATION DATE: NORMAL
WBC # BLD AUTO: 6.87 THOUSAND/UL (ref 4.31–10.16)

## 2020-04-23 PROCEDURE — 86901 BLOOD TYPING SEROLOGIC RH(D): CPT

## 2020-04-23 PROCEDURE — 85025 COMPLETE CBC W/AUTO DIFF WBC: CPT

## 2020-04-23 PROCEDURE — 83036 HEMOGLOBIN GLYCOSYLATED A1C: CPT

## 2020-04-23 PROCEDURE — 80053 COMPREHEN METABOLIC PANEL: CPT

## 2020-04-23 PROCEDURE — 36415 COLL VENOUS BLD VENIPUNCTURE: CPT

## 2020-04-23 PROCEDURE — 86900 BLOOD TYPING SEROLOGIC ABO: CPT

## 2020-04-23 PROCEDURE — 86850 RBC ANTIBODY SCREEN: CPT

## 2020-04-29 ENCOUNTER — OFFICE VISIT (OUTPATIENT)
Dept: OBGYN CLINIC | Facility: CLINIC | Age: 50
End: 2020-04-29
Payer: COMMERCIAL

## 2020-04-29 ENCOUNTER — TRANSCRIBE ORDERS (OUTPATIENT)
Dept: ADMINISTRATIVE | Age: 50
End: 2020-04-29

## 2020-04-29 ENCOUNTER — APPOINTMENT (OUTPATIENT)
Dept: LAB | Age: 50
End: 2020-04-29
Payer: COMMERCIAL

## 2020-04-29 VITALS
TEMPERATURE: 98.2 F | BODY MASS INDEX: 21.91 KG/M2 | WEIGHT: 142 LBS | DIASTOLIC BLOOD PRESSURE: 90 MMHG | SYSTOLIC BLOOD PRESSURE: 148 MMHG

## 2020-04-29 DIAGNOSIS — R93.5 ABNORMAL ABDOMINAL ULTRASOUND: Primary | ICD-10-CM

## 2020-04-29 DIAGNOSIS — D25.0 SUBMUCOUS LEIOMYOMA OF UTERUS: ICD-10-CM

## 2020-04-29 DIAGNOSIS — R93.5 ABNORMAL ABDOMINAL ULTRASOUND: ICD-10-CM

## 2020-04-29 DIAGNOSIS — N93.9 ABNORMAL UTERINE BLEEDING (AUB): Primary | ICD-10-CM

## 2020-04-29 DIAGNOSIS — R93.5 ABNORMAL ULTRASOUND OF UTERUS: ICD-10-CM

## 2020-04-29 LAB
ERYTHROCYTE [DISTWIDTH] IN BLOOD BY AUTOMATED COUNT: 13.3 % (ref 11.6–15.1)
HCT VFR BLD AUTO: 32.7 % (ref 34.8–46.1)
HGB BLD-MCNC: 10.4 G/DL (ref 11.5–15.4)
MCH RBC QN AUTO: 29.1 PG (ref 26.8–34.3)
MCHC RBC AUTO-ENTMCNC: 31.8 G/DL (ref 31.4–37.4)
MCV RBC AUTO: 91 FL (ref 82–98)
PLATELET # BLD AUTO: 515 THOUSANDS/UL (ref 149–390)
PMV BLD AUTO: 10.1 FL (ref 8.9–12.7)
RBC # BLD AUTO: 3.58 MILLION/UL (ref 3.81–5.12)
WBC # BLD AUTO: 7.33 THOUSAND/UL (ref 4.31–10.16)

## 2020-04-29 PROCEDURE — 3077F SYST BP >= 140 MM HG: CPT | Performed by: OBSTETRICS & GYNECOLOGY

## 2020-04-29 PROCEDURE — 3080F DIAST BP >= 90 MM HG: CPT | Performed by: OBSTETRICS & GYNECOLOGY

## 2020-04-29 PROCEDURE — 99214 OFFICE O/P EST MOD 30 MIN: CPT | Performed by: OBSTETRICS & GYNECOLOGY

## 2020-04-29 PROCEDURE — 36415 COLL VENOUS BLD VENIPUNCTURE: CPT

## 2020-04-29 PROCEDURE — 85027 COMPLETE CBC AUTOMATED: CPT

## 2020-04-30 ENCOUNTER — TELEPHONE (OUTPATIENT)
Dept: OBGYN CLINIC | Facility: CLINIC | Age: 50
End: 2020-04-30

## 2020-05-01 ENCOUNTER — PREP FOR PROCEDURE (OUTPATIENT)
Dept: OBGYN CLINIC | Facility: CLINIC | Age: 50
End: 2020-05-01

## 2020-05-01 DIAGNOSIS — D25.0 ABNORMAL UTERINE BLEEDING DUE TO SUBMUCOUSAL LEIOMYOMA OF UTERUS: ICD-10-CM

## 2020-05-01 DIAGNOSIS — D25.0 SUBMUCOUS LEIOMYOMA OF UTERUS: Primary | ICD-10-CM

## 2020-05-01 DIAGNOSIS — N93.9 ABNORMAL UTERINE BLEEDING DUE TO SUBMUCOUSAL LEIOMYOMA OF UTERUS: ICD-10-CM

## 2020-05-01 RX ORDER — GABAPENTIN 100 MG/1
100 CAPSULE ORAL ONCE
Status: CANCELLED | OUTPATIENT
Start: 2020-05-01 | End: 2020-05-01

## 2020-05-01 RX ORDER — ACETAMINOPHEN 325 MG/1
975 TABLET ORAL ONCE
Status: CANCELLED | OUTPATIENT
Start: 2020-05-01 | End: 2020-05-01

## 2020-05-01 RX ORDER — PHENAZOPYRIDINE HYDROCHLORIDE 100 MG/1
100 TABLET, FILM COATED ORAL ONCE
Status: CANCELLED | OUTPATIENT
Start: 2020-05-01

## 2020-05-01 RX ORDER — CELECOXIB 100 MG/1
200 CAPSULE ORAL ONCE
Status: CANCELLED | OUTPATIENT
Start: 2020-05-01 | End: 2020-05-01

## 2020-05-04 ENCOUNTER — TELEPHONE (OUTPATIENT)
Dept: OBGYN CLINIC | Facility: CLINIC | Age: 50
End: 2020-05-04

## 2020-05-05 DIAGNOSIS — Z01.818 PRE-OP TESTING: Primary | ICD-10-CM

## 2020-05-05 DIAGNOSIS — Z01.818 PRE-OP TESTING: ICD-10-CM

## 2020-05-05 PROCEDURE — U0003 INFECTIOUS AGENT DETECTION BY NUCLEIC ACID (DNA OR RNA); SEVERE ACUTE RESPIRATORY SYNDROME CORONAVIRUS 2 (SARS-COV-2) (CORONAVIRUS DISEASE [COVID-19]), AMPLIFIED PROBE TECHNIQUE, MAKING USE OF HIGH THROUGHPUT TECHNOLOGIES AS DESCRIBED BY CMS-2020-01-R: HCPCS

## 2020-05-06 LAB — SARS-COV-2 RNA SPEC QL NAA+PROBE: NOT DETECTED

## 2020-05-11 ENCOUNTER — ANESTHESIA EVENT (OUTPATIENT)
Dept: PERIOP | Facility: HOSPITAL | Age: 50
End: 2020-05-11
Payer: COMMERCIAL

## 2020-05-11 ENCOUNTER — ANESTHESIA (OUTPATIENT)
Dept: PERIOP | Facility: HOSPITAL | Age: 50
End: 2020-05-11
Payer: COMMERCIAL

## 2020-05-11 ENCOUNTER — HOSPITAL ENCOUNTER (OUTPATIENT)
Facility: HOSPITAL | Age: 50
Setting detail: OUTPATIENT SURGERY
Discharge: HOME | End: 2020-05-11
Attending: OBSTETRICS & GYNECOLOGY | Admitting: OBSTETRICS & GYNECOLOGY
Payer: COMMERCIAL

## 2020-05-11 VITALS
OXYGEN SATURATION: 100 % | BODY MASS INDEX: 21.52 KG/M2 | TEMPERATURE: 97.9 F | RESPIRATION RATE: 19 BRPM | HEART RATE: 58 BPM | WEIGHT: 142 LBS | HEIGHT: 68 IN | DIASTOLIC BLOOD PRESSURE: 70 MMHG | SYSTOLIC BLOOD PRESSURE: 150 MMHG

## 2020-05-11 DIAGNOSIS — Z90.710 STATUS POST VAGINAL HYSTERECTOMY: Primary | ICD-10-CM

## 2020-05-11 DIAGNOSIS — D25.0 SUBMUCOUS LEIOMYOMA OF UTERUS: ICD-10-CM

## 2020-05-11 DIAGNOSIS — N93.9 ABNORMAL UTERINE BLEEDING DUE TO SUBMUCOUSAL LEIOMYOMA OF UTERUS: ICD-10-CM

## 2020-05-11 DIAGNOSIS — D25.0 ABNORMAL UTERINE BLEEDING DUE TO SUBMUCOUSAL LEIOMYOMA OF UTERUS: ICD-10-CM

## 2020-05-11 DIAGNOSIS — Z98.890 STATUS POST MYOMECTOMY: ICD-10-CM

## 2020-05-11 LAB
ABO GROUP BLD: NORMAL
RH BLD: POSITIVE

## 2020-05-11 PROCEDURE — 58262 VAG HYST INCLUDING T/O: CPT | Performed by: OBSTETRICS & GYNECOLOGY

## 2020-05-11 PROCEDURE — 88307 TISSUE EXAM BY PATHOLOGIST: CPT | Performed by: PATHOLOGY

## 2020-05-11 PROCEDURE — 88305 TISSUE EXAM BY PATHOLOGIST: CPT | Performed by: PATHOLOGY

## 2020-05-11 RX ORDER — ACETAMINOPHEN 325 MG/1
650 TABLET ORAL EVERY 6 HOURS PRN
Status: DISCONTINUED | OUTPATIENT
Start: 2020-05-11 | End: 2020-05-11 | Stop reason: HOSPADM

## 2020-05-11 RX ORDER — ONDANSETRON 2 MG/ML
4 INJECTION INTRAMUSCULAR; INTRAVENOUS EVERY 6 HOURS PRN
Status: DISCONTINUED | OUTPATIENT
Start: 2020-05-11 | End: 2020-05-11 | Stop reason: HOSPADM

## 2020-05-11 RX ORDER — OXYCODONE HYDROCHLORIDE 5 MG/1
5 TABLET ORAL EVERY 6 HOURS PRN
Qty: 15 TABLET | Refills: 0 | Status: SHIPPED | OUTPATIENT
Start: 2020-05-11 | End: 2020-05-16

## 2020-05-11 RX ORDER — CEFAZOLIN SODIUM 1 G/50ML
1000 SOLUTION INTRAVENOUS ONCE
Status: COMPLETED | OUTPATIENT
Start: 2020-05-11 | End: 2020-05-11

## 2020-05-11 RX ORDER — HYDROMORPHONE HCL/PF 1 MG/ML
0.5 SYRINGE (ML) INJECTION
Status: DISCONTINUED | OUTPATIENT
Start: 2020-05-11 | End: 2020-05-11 | Stop reason: HOSPADM

## 2020-05-11 RX ORDER — BUPIVACAINE HYDROCHLORIDE AND EPINEPHRINE 2.5; 5 MG/ML; UG/ML
INJECTION, SOLUTION INFILTRATION; PERINEURAL AS NEEDED
Status: DISCONTINUED | OUTPATIENT
Start: 2020-05-11 | End: 2020-05-11 | Stop reason: HOSPADM

## 2020-05-11 RX ORDER — MAGNESIUM HYDROXIDE 1200 MG/15ML
LIQUID ORAL AS NEEDED
Status: DISCONTINUED | OUTPATIENT
Start: 2020-05-11 | End: 2020-05-11 | Stop reason: HOSPADM

## 2020-05-11 RX ORDER — ONDANSETRON 2 MG/ML
INJECTION INTRAMUSCULAR; INTRAVENOUS AS NEEDED
Status: DISCONTINUED | OUTPATIENT
Start: 2020-05-11 | End: 2020-05-11 | Stop reason: SURG

## 2020-05-11 RX ORDER — PHENAZOPYRIDINE HYDROCHLORIDE 100 MG/1
100 TABLET, FILM COATED ORAL ONCE
Status: COMPLETED | OUTPATIENT
Start: 2020-05-11 | End: 2020-05-11

## 2020-05-11 RX ORDER — ONDANSETRON 2 MG/ML
4 INJECTION INTRAMUSCULAR; INTRAVENOUS ONCE AS NEEDED
Status: DISCONTINUED | OUTPATIENT
Start: 2020-05-11 | End: 2020-05-11 | Stop reason: HOSPADM

## 2020-05-11 RX ORDER — FENTANYL CITRATE 50 UG/ML
INJECTION, SOLUTION INTRAMUSCULAR; INTRAVENOUS AS NEEDED
Status: DISCONTINUED | OUTPATIENT
Start: 2020-05-11 | End: 2020-05-11 | Stop reason: SURG

## 2020-05-11 RX ORDER — ACETAMINOPHEN 325 MG/1
975 TABLET ORAL ONCE
Status: COMPLETED | OUTPATIENT
Start: 2020-05-11 | End: 2020-05-11

## 2020-05-11 RX ORDER — MIDAZOLAM HYDROCHLORIDE 2 MG/2ML
INJECTION, SOLUTION INTRAMUSCULAR; INTRAVENOUS AS NEEDED
Status: DISCONTINUED | OUTPATIENT
Start: 2020-05-11 | End: 2020-05-11 | Stop reason: SURG

## 2020-05-11 RX ORDER — METOCLOPRAMIDE HYDROCHLORIDE 5 MG/ML
10 INJECTION INTRAMUSCULAR; INTRAVENOUS ONCE AS NEEDED
Status: DISCONTINUED | OUTPATIENT
Start: 2020-05-11 | End: 2020-05-11 | Stop reason: HOSPADM

## 2020-05-11 RX ORDER — EPHEDRINE SULFATE 50 MG/ML
INJECTION INTRAVENOUS AS NEEDED
Status: DISCONTINUED | OUTPATIENT
Start: 2020-05-11 | End: 2020-05-11 | Stop reason: SURG

## 2020-05-11 RX ORDER — LIDOCAINE HYDROCHLORIDE AND EPINEPHRINE 20; 5 MG/ML; UG/ML
INJECTION, SOLUTION EPIDURAL; INFILTRATION; INTRACAUDAL; PERINEURAL AS NEEDED
Status: DISCONTINUED | OUTPATIENT
Start: 2020-05-11 | End: 2020-05-11 | Stop reason: HOSPADM

## 2020-05-11 RX ORDER — ROCURONIUM BROMIDE 10 MG/ML
INJECTION, SOLUTION INTRAVENOUS AS NEEDED
Status: DISCONTINUED | OUTPATIENT
Start: 2020-05-11 | End: 2020-05-11 | Stop reason: SURG

## 2020-05-11 RX ORDER — DEXAMETHASONE SODIUM PHOSPHATE 10 MG/ML
INJECTION, SOLUTION INTRAMUSCULAR; INTRAVENOUS AS NEEDED
Status: DISCONTINUED | OUTPATIENT
Start: 2020-05-11 | End: 2020-05-11 | Stop reason: SURG

## 2020-05-11 RX ORDER — OXYCODONE HYDROCHLORIDE AND ACETAMINOPHEN 5; 325 MG/1; MG/1
1 TABLET ORAL EVERY 4 HOURS PRN
Status: DISCONTINUED | OUTPATIENT
Start: 2020-05-11 | End: 2020-05-11 | Stop reason: HOSPADM

## 2020-05-11 RX ORDER — GABAPENTIN 100 MG/1
100 CAPSULE ORAL ONCE
Status: COMPLETED | OUTPATIENT
Start: 2020-05-11 | End: 2020-05-11

## 2020-05-11 RX ORDER — FENTANYL CITRATE/PF 50 MCG/ML
50 SYRINGE (ML) INJECTION
Status: DISCONTINUED | OUTPATIENT
Start: 2020-05-11 | End: 2020-05-11 | Stop reason: HOSPADM

## 2020-05-11 RX ORDER — SODIUM CHLORIDE, SODIUM LACTATE, POTASSIUM CHLORIDE, CALCIUM CHLORIDE 600; 310; 30; 20 MG/100ML; MG/100ML; MG/100ML; MG/100ML
125 INJECTION, SOLUTION INTRAVENOUS CONTINUOUS
Status: DISCONTINUED | OUTPATIENT
Start: 2020-05-11 | End: 2020-05-11 | Stop reason: HOSPADM

## 2020-05-11 RX ORDER — LIDOCAINE HYDROCHLORIDE 10 MG/ML
INJECTION, SOLUTION EPIDURAL; INFILTRATION; INTRACAUDAL; PERINEURAL AS NEEDED
Status: DISCONTINUED | OUTPATIENT
Start: 2020-05-11 | End: 2020-05-11 | Stop reason: SURG

## 2020-05-11 RX ORDER — PROPOFOL 10 MG/ML
INJECTION, EMULSION INTRAVENOUS AS NEEDED
Status: DISCONTINUED | OUTPATIENT
Start: 2020-05-11 | End: 2020-05-11 | Stop reason: SURG

## 2020-05-11 RX ORDER — OXYCODONE HYDROCHLORIDE AND ACETAMINOPHEN 5; 325 MG/1; MG/1
2 TABLET ORAL EVERY 6 HOURS PRN
Status: DISCONTINUED | OUTPATIENT
Start: 2020-05-11 | End: 2020-05-11 | Stop reason: HOSPADM

## 2020-05-11 RX ORDER — SODIUM CHLORIDE, SODIUM LACTATE, POTASSIUM CHLORIDE, CALCIUM CHLORIDE 600; 310; 30; 20 MG/100ML; MG/100ML; MG/100ML; MG/100ML
50 INJECTION, SOLUTION INTRAVENOUS CONTINUOUS
Status: DISCONTINUED | OUTPATIENT
Start: 2020-05-11 | End: 2020-05-11 | Stop reason: HOSPADM

## 2020-05-11 RX ORDER — CELECOXIB 100 MG/1
200 CAPSULE ORAL ONCE
Status: COMPLETED | OUTPATIENT
Start: 2020-05-11 | End: 2020-05-11

## 2020-05-11 RX ADMIN — CEFAZOLIN SODIUM 1000 MG: 1 SOLUTION INTRAVENOUS at 09:54

## 2020-05-11 RX ADMIN — ACETAMINOPHEN 975 MG: 325 TABLET, FILM COATED ORAL at 09:26

## 2020-05-11 RX ADMIN — SODIUM CHLORIDE, SODIUM LACTATE, POTASSIUM CHLORIDE, AND CALCIUM CHLORIDE 50 ML/HR: .6; .31; .03; .02 INJECTION, SOLUTION INTRAVENOUS at 09:32

## 2020-05-11 RX ADMIN — EPHEDRINE SULFATE 5 MG: 50 INJECTION, SOLUTION INTRAVENOUS at 10:45

## 2020-05-11 RX ADMIN — OXYCODONE HYDROCHLORIDE AND ACETAMINOPHEN 2 TABLET: 5; 325 TABLET ORAL at 13:24

## 2020-05-11 RX ADMIN — ROCURONIUM BROMIDE 50 MG: 10 INJECTION, SOLUTION INTRAVENOUS at 10:02

## 2020-05-11 RX ADMIN — MIDAZOLAM HYDROCHLORIDE 2 MG: 1 INJECTION, SOLUTION INTRAMUSCULAR; INTRAVENOUS at 09:54

## 2020-05-11 RX ADMIN — FENTANYL CITRATE 50 MCG: 0.05 INJECTION, SOLUTION INTRAMUSCULAR; INTRAVENOUS at 12:36

## 2020-05-11 RX ADMIN — ONDANSETRON 4 MG: 2 INJECTION INTRAMUSCULAR; INTRAVENOUS at 12:00

## 2020-05-11 RX ADMIN — GABAPENTIN 100 MG: 100 CAPSULE ORAL at 09:26

## 2020-05-11 RX ADMIN — LIDOCAINE HYDROCHLORIDE 50 MG: 10 INJECTION, SOLUTION EPIDURAL; INFILTRATION; INTRACAUDAL; PERINEURAL at 09:57

## 2020-05-11 RX ADMIN — EPHEDRINE SULFATE 5 MG: 50 INJECTION, SOLUTION INTRAVENOUS at 10:18

## 2020-05-11 RX ADMIN — PHENAZOPYRIDINE 100 MG: 100 TABLET ORAL at 09:26

## 2020-05-11 RX ADMIN — ROCURONIUM BROMIDE 10 MG: 10 INJECTION, SOLUTION INTRAVENOUS at 11:05

## 2020-05-11 RX ADMIN — FENTANYL CITRATE 100 MCG: 50 INJECTION INTRAMUSCULAR; INTRAVENOUS at 09:56

## 2020-05-11 RX ADMIN — EPHEDRINE SULFATE 10 MG: 50 INJECTION, SOLUTION INTRAVENOUS at 10:30

## 2020-05-11 RX ADMIN — PROPOFOL 200 MG: 10 INJECTION, EMULSION INTRAVENOUS at 09:57

## 2020-05-11 RX ADMIN — CELECOXIB 200 MG: 100 CAPSULE ORAL at 09:26

## 2020-05-11 RX ADMIN — DEXAMETHASONE SODIUM PHOSPHATE 4 MG: 10 INJECTION, SOLUTION INTRAMUSCULAR; INTRAVENOUS at 10:02

## 2020-05-24 DIAGNOSIS — F51.01 PRIMARY INSOMNIA: ICD-10-CM

## 2020-05-26 RX ORDER — TEMAZEPAM 30 MG/1
CAPSULE ORAL
Qty: 60 CAPSULE | Refills: 0 | Status: SHIPPED | OUTPATIENT
Start: 2020-05-26 | End: 2020-07-02 | Stop reason: SDUPTHER

## 2020-06-01 ENCOUNTER — OFFICE VISIT (OUTPATIENT)
Dept: OBGYN CLINIC | Facility: CLINIC | Age: 50
End: 2020-06-01

## 2020-06-01 VITALS
WEIGHT: 141.2 LBS | BODY MASS INDEX: 21.79 KG/M2 | DIASTOLIC BLOOD PRESSURE: 76 MMHG | TEMPERATURE: 99.7 F | SYSTOLIC BLOOD PRESSURE: 122 MMHG

## 2020-06-01 DIAGNOSIS — Z90.710 STATUS POST VAGINAL HYSTERECTOMY: ICD-10-CM

## 2020-06-01 DIAGNOSIS — Z09 POSTOPERATIVE EXAMINATION: Primary | ICD-10-CM

## 2020-06-01 PROCEDURE — 99024 POSTOP FOLLOW-UP VISIT: CPT | Performed by: OBSTETRICS & GYNECOLOGY

## 2020-06-01 PROCEDURE — 3074F SYST BP LT 130 MM HG: CPT | Performed by: OBSTETRICS & GYNECOLOGY

## 2020-06-01 PROCEDURE — 3078F DIAST BP <80 MM HG: CPT | Performed by: OBSTETRICS & GYNECOLOGY

## 2020-06-04 ENCOUNTER — OFFICE VISIT (OUTPATIENT)
Dept: FAMILY MEDICINE CLINIC | Facility: CLINIC | Age: 50
End: 2020-06-04
Payer: COMMERCIAL

## 2020-06-04 ENCOUNTER — HOSPITAL ENCOUNTER (OUTPATIENT)
Dept: NON INVASIVE DIAGNOSTICS | Facility: CLINIC | Age: 50
Discharge: HOME | End: 2020-06-04
Payer: COMMERCIAL

## 2020-06-04 VITALS
BODY MASS INDEX: 21.37 KG/M2 | TEMPERATURE: 98.6 F | SYSTOLIC BLOOD PRESSURE: 120 MMHG | HEART RATE: 60 BPM | WEIGHT: 141 LBS | DIASTOLIC BLOOD PRESSURE: 68 MMHG | HEIGHT: 68 IN

## 2020-06-04 DIAGNOSIS — R23.0 TOE CYANOSIS: Primary | ICD-10-CM

## 2020-06-04 DIAGNOSIS — R23.0 TOE CYANOSIS: ICD-10-CM

## 2020-06-04 DIAGNOSIS — I99.9 VASCULAR ABNORMALITY: Primary | ICD-10-CM

## 2020-06-04 DIAGNOSIS — I75.023 BLUE TOE SYNDROME OF BOTH LOWER EXTREMITIES (HCC): ICD-10-CM

## 2020-06-04 PROCEDURE — 3078F DIAST BP <80 MM HG: CPT | Performed by: NURSE PRACTITIONER

## 2020-06-04 PROCEDURE — 99214 OFFICE O/P EST MOD 30 MIN: CPT | Performed by: NURSE PRACTITIONER

## 2020-06-04 PROCEDURE — 3074F SYST BP LT 130 MM HG: CPT | Performed by: NURSE PRACTITIONER

## 2020-06-04 PROCEDURE — 93970 EXTREMITY STUDY: CPT

## 2020-06-04 PROCEDURE — 93970 EXTREMITY STUDY: CPT | Performed by: SURGERY

## 2020-06-04 PROCEDURE — 3008F BODY MASS INDEX DOCD: CPT | Performed by: NURSE PRACTITIONER

## 2020-06-05 ENCOUNTER — CONSULT (OUTPATIENT)
Dept: VASCULAR SURGERY | Facility: CLINIC | Age: 50
End: 2020-06-05
Payer: COMMERCIAL

## 2020-06-05 ENCOUNTER — HOSPITAL ENCOUNTER (OUTPATIENT)
Dept: NON INVASIVE DIAGNOSTICS | Facility: CLINIC | Age: 50
Discharge: HOME | End: 2020-06-05
Payer: COMMERCIAL

## 2020-06-05 ENCOUNTER — HOSPITAL ENCOUNTER (OUTPATIENT)
Dept: RADIOLOGY | Age: 50
Discharge: HOME | End: 2020-06-05
Payer: COMMERCIAL

## 2020-06-05 ENCOUNTER — TRANSCRIBE ORDERS (OUTPATIENT)
Dept: ADMINISTRATIVE | Facility: HOSPITAL | Age: 50
End: 2020-06-05

## 2020-06-05 ENCOUNTER — TELEPHONE (OUTPATIENT)
Dept: VASCULAR SURGERY | Facility: CLINIC | Age: 50
End: 2020-06-05

## 2020-06-05 VITALS
TEMPERATURE: 99 F | HEART RATE: 86 BPM | SYSTOLIC BLOOD PRESSURE: 100 MMHG | WEIGHT: 140 LBS | HEIGHT: 67 IN | DIASTOLIC BLOOD PRESSURE: 60 MMHG | BODY MASS INDEX: 21.97 KG/M2

## 2020-06-05 DIAGNOSIS — I75.022 BLUE TOE SYNDROME OF LEFT LOWER EXTREMITY (HCC): Primary | ICD-10-CM

## 2020-06-05 DIAGNOSIS — R23.0 TOE CYANOSIS: ICD-10-CM

## 2020-06-05 DIAGNOSIS — I73.9 PAD (PERIPHERAL ARTERY DISEASE) (HCC): ICD-10-CM

## 2020-06-05 DIAGNOSIS — I73.9 PAD (PERIPHERAL ARTERY DISEASE) (HCC): Primary | ICD-10-CM

## 2020-06-05 PROCEDURE — 93923 UPR/LXTR ART STDY 3+ LVLS: CPT

## 2020-06-05 PROCEDURE — 75635 CT ANGIO ABDOMINAL ARTERIES: CPT

## 2020-06-05 PROCEDURE — 93922 UPR/L XTREMITY ART 2 LEVELS: CPT | Performed by: SURGERY

## 2020-06-05 PROCEDURE — 93925 LOWER EXTREMITY STUDY: CPT | Performed by: SURGERY

## 2020-06-05 PROCEDURE — 99204 OFFICE O/P NEW MOD 45 MIN: CPT | Performed by: SURGERY

## 2020-06-05 PROCEDURE — 93925 LOWER EXTREMITY STUDY: CPT

## 2020-06-05 RX ORDER — OXYCODONE HYDROCHLORIDE AND ACETAMINOPHEN 5; 325 MG/1; MG/1
1 TABLET ORAL EVERY 4 HOURS PRN
Qty: 30 TABLET | Refills: 0 | Status: SHIPPED | OUTPATIENT
Start: 2020-06-05 | End: 2020-06-16 | Stop reason: SDUPTHER

## 2020-06-05 RX ORDER — ASPIRIN 325 MG
325 TABLET ORAL DAILY
COMMUNITY
End: 2020-08-17 | Stop reason: RX

## 2020-06-05 RX ADMIN — IOHEXOL 120 ML: 350 INJECTION, SOLUTION INTRAVENOUS at 15:52

## 2020-06-08 ENCOUNTER — TELEPHONE (OUTPATIENT)
Dept: VASCULAR SURGERY | Facility: CLINIC | Age: 50
End: 2020-06-08

## 2020-06-08 ENCOUNTER — TRANSCRIBE ORDERS (OUTPATIENT)
Dept: FAMILY MEDICINE CLINIC | Facility: CLINIC | Age: 50
End: 2020-06-08

## 2020-06-08 DIAGNOSIS — I73.9 PERIPHERAL VASCULAR DISEASE, UNSPECIFIED (HCC): ICD-10-CM

## 2020-06-08 DIAGNOSIS — I48.91 ATRIAL FIBRILLATION, UNSPECIFIED TYPE (HCC): Primary | ICD-10-CM

## 2020-06-08 DIAGNOSIS — I75.022 ATHEROEMBOLISM OF LEFT LOWER EXTREMITY (HCC): Primary | ICD-10-CM

## 2020-06-09 RX ADMIN — IOHEXOL 100 ML: 350 INJECTION, SOLUTION INTRAVENOUS at 10:53

## 2020-06-12 ENCOUNTER — TELEPHONE (OUTPATIENT)
Dept: FAMILY MEDICINE CLINIC | Facility: CLINIC | Age: 50
End: 2020-06-12

## 2020-06-12 ENCOUNTER — HOSPITAL ENCOUNTER (OUTPATIENT)
Dept: NON INVASIVE DIAGNOSTICS | Facility: CLINIC | Age: 50
Discharge: HOME | End: 2020-06-12
Payer: COMMERCIAL

## 2020-06-12 DIAGNOSIS — I48.91 ATRIAL FIBRILLATION, UNSPECIFIED TYPE (HCC): ICD-10-CM

## 2020-06-12 PROCEDURE — 93226 XTRNL ECG REC<48 HR SCAN A/R: CPT

## 2020-06-12 PROCEDURE — 93225 XTRNL ECG REC<48 HRS REC: CPT

## 2020-06-16 ENCOUNTER — TELEPHONE (OUTPATIENT)
Dept: FAMILY MEDICINE CLINIC | Facility: CLINIC | Age: 50
End: 2020-06-16

## 2020-06-16 DIAGNOSIS — R00.2 PALPITATIONS: Primary | ICD-10-CM

## 2020-06-16 DIAGNOSIS — D68.59 ACQUIRED HYPERCOAGULABLE STATE (HCC): ICD-10-CM

## 2020-06-16 DIAGNOSIS — I73.9 PAD (PERIPHERAL ARTERY DISEASE) (HCC): ICD-10-CM

## 2020-06-16 PROCEDURE — 93227 XTRNL ECG REC<48 HR R&I: CPT | Performed by: INTERNAL MEDICINE

## 2020-06-16 RX ORDER — OXYCODONE HYDROCHLORIDE AND ACETAMINOPHEN 5; 325 MG/1; MG/1
1 TABLET ORAL EVERY 4 HOURS PRN
Qty: 30 TABLET | Refills: 0 | Status: SHIPPED | OUTPATIENT
Start: 2020-06-16 | End: 2020-07-14 | Stop reason: SDUPTHER

## 2020-06-18 ENCOUNTER — HOSPITAL ENCOUNTER (OUTPATIENT)
Dept: NON INVASIVE DIAGNOSTICS | Facility: HOSPITAL | Age: 50
Discharge: HOME | End: 2020-06-18
Payer: COMMERCIAL

## 2020-06-18 ENCOUNTER — TELEPHONE (OUTPATIENT)
Dept: HEMATOLOGY ONCOLOGY | Facility: CLINIC | Age: 50
End: 2020-06-18

## 2020-06-18 DIAGNOSIS — R00.2 PALPITATIONS: ICD-10-CM

## 2020-06-18 PROCEDURE — 93306 TTE W/DOPPLER COMPLETE: CPT

## 2020-06-18 PROCEDURE — 93306 TTE W/DOPPLER COMPLETE: CPT | Performed by: INTERNAL MEDICINE

## 2020-06-29 ENCOUNTER — TELEPHONE (OUTPATIENT)
Dept: FAMILY MEDICINE CLINIC | Facility: CLINIC | Age: 50
End: 2020-06-29

## 2020-06-29 DIAGNOSIS — H65.93 BILATERAL NON-SUPPURATIVE OTITIS MEDIA: Primary | ICD-10-CM

## 2020-06-29 RX ORDER — AZITHROMYCIN 250 MG/1
TABLET, FILM COATED ORAL
Qty: 6 TABLET | Refills: 0 | Status: SHIPPED | OUTPATIENT
Start: 2020-06-29 | End: 2020-07-03

## 2020-07-02 DIAGNOSIS — L50.9 URTICARIA: ICD-10-CM

## 2020-07-02 DIAGNOSIS — F51.01 PRIMARY INSOMNIA: ICD-10-CM

## 2020-07-02 RX ORDER — CETIRIZINE HYDROCHLORIDE 10 MG/1
10 TABLET ORAL DAILY
Qty: 90 TABLET | Refills: 0 | Status: SHIPPED | OUTPATIENT
Start: 2020-07-02 | End: 2020-09-14 | Stop reason: SDUPTHER

## 2020-07-02 RX ORDER — TEMAZEPAM 30 MG/1
CAPSULE ORAL
Qty: 60 CAPSULE | Refills: 0 | Status: SHIPPED | OUTPATIENT
Start: 2020-07-02 | End: 2020-08-16 | Stop reason: SDUPTHER

## 2020-07-04 DIAGNOSIS — I73.9 PAD (PERIPHERAL ARTERY DISEASE) (HCC): ICD-10-CM

## 2020-07-08 RX ORDER — APIXABAN 5 MG/1
TABLET, FILM COATED ORAL
Qty: 74 TABLET | Refills: 0 | Status: SHIPPED | OUTPATIENT
Start: 2020-07-08 | End: 2020-08-17

## 2020-07-14 DIAGNOSIS — I73.9 PAD (PERIPHERAL ARTERY DISEASE) (HCC): ICD-10-CM

## 2020-07-14 RX ORDER — OXYCODONE HYDROCHLORIDE AND ACETAMINOPHEN 5; 325 MG/1; MG/1
1 TABLET ORAL EVERY 4 HOURS PRN
Qty: 30 TABLET | Refills: 0 | Status: SHIPPED | OUTPATIENT
Start: 2020-07-14 | End: 2020-08-17 | Stop reason: SDUPTHER

## 2020-07-14 NOTE — TELEPHONE ENCOUNTER
Per PDMP last fill 06/16/20   last OV 06/04/20 06/16/2020 1 06/16/2020 OXYCODONE-ACETAMINOPHEN 5-325 30 0 7 [facility] [id number] [facility] ([id number]) 0 32 14 [facility] [place]

## 2020-08-16 DIAGNOSIS — F51.01 PRIMARY INSOMNIA: ICD-10-CM

## 2020-08-17 ENCOUNTER — OFFICE VISIT (OUTPATIENT)
Dept: FAMILY MEDICINE CLINIC | Facility: CLINIC | Age: 50
End: 2020-08-17
Payer: COMMERCIAL

## 2020-08-17 VITALS
DIASTOLIC BLOOD PRESSURE: 68 MMHG | TEMPERATURE: 98.3 F | BODY MASS INDEX: 22.76 KG/M2 | HEART RATE: 78 BPM | WEIGHT: 145 LBS | SYSTOLIC BLOOD PRESSURE: 110 MMHG | HEIGHT: 67 IN

## 2020-08-17 DIAGNOSIS — R23.0 CYANOSIS OF SKIN: Primary | ICD-10-CM

## 2020-08-17 PROCEDURE — 3008F BODY MASS INDEX DOCD: CPT | Performed by: NURSE PRACTITIONER

## 2020-08-17 PROCEDURE — 99214 OFFICE O/P EST MOD 30 MIN: CPT | Performed by: NURSE PRACTITIONER

## 2020-08-17 PROCEDURE — 3078F DIAST BP <80 MM HG: CPT | Performed by: NURSE PRACTITIONER

## 2020-08-17 PROCEDURE — 3074F SYST BP LT 130 MM HG: CPT | Performed by: NURSE PRACTITIONER

## 2020-08-17 RX ORDER — TEMAZEPAM 30 MG/1
CAPSULE ORAL
Qty: 60 CAPSULE | Refills: 0 | Status: SHIPPED | OUTPATIENT
Start: 2020-08-17 | End: 2020-09-14 | Stop reason: SDUPTHER

## 2020-08-17 RX ORDER — OXYCODONE HYDROCHLORIDE AND ACETAMINOPHEN 5; 325 MG/1; MG/1
1 TABLET ORAL EVERY 6 HOURS PRN
Qty: 30 TABLET | Refills: 0 | Status: SHIPPED | OUTPATIENT
Start: 2020-08-17 | End: 2020-09-23 | Stop reason: ALTCHOICE

## 2020-08-17 RX ORDER — DABIGATRAN ETEXILATE 75 MG/1
75 CAPSULE, COATED PELLETS ORAL EVERY 12 HOURS SCHEDULED
Qty: 60 CAPSULE | Refills: 2 | Status: SHIPPED | OUTPATIENT
Start: 2020-08-17 | End: 2020-08-17 | Stop reason: RX

## 2020-08-17 NOTE — PROGRESS NOTES
Assessment/Plan:       Diagnoses and all orders for this visit:    Cyanosis of skin  -     dabigatran etexilate (PRADAXA) 75 mg capsule; Take 1 capsule (75 mg total) by mouth every 12 (twelve) hours        -     oxyCODONE-acetaminophen (PERCOCET) 5-325 mg per tablet; Take 1 tablet by mouth every 6 (six) hours as needed for severe painMax Daily Amount: 4 tablets  -     Comprehensive metabolic panel; Future  -     CBC and differential; Future  -     Sedimentation rate, automated; Future  -     Coagulation Profile; Future  -     C-reactive protein; Future  -     Anti-neutrophilic cytoplasmic antibody; Future  -     EVAN Screen w/ Reflex to Titer/Pattern; Future  -     VAS lower limb arterial duplex, complete bilateral; Future  -     Direct antiglobulin test; Future  -     Retic Count; Future  -     Lactate dehydrogenase, isoenzymes; Future  -     Hemolysis Smear; Future  -     Direct antiglobulin test  -     Retic Count  -     Lactate dehydrogenase, isoenzymes    Discussed with patient plan to place her back on an anti-coagulant medication  Discussed with patient pros and cons of using Warfarin/Xarelto/Pradexa/Eliquis  Discussed with patient need for Percocet to manage right foot pain daily and need for Narcan availability - patient declined Narcan prescription  Discussed with patient plan to obtain multiple labs to investigate potential etiology of continued cyanosis  Discussed with patient plan to repeat arterial duplex to evaluate for potential re-clotting   Discussed with patient potential of need to evaluate to vascular surgery, hematology and/or rheumatology in future  Patient instructed to return in one month for follow up  Patient instructed to call for problems or concerns in the interim    Subjective:      Patient ID: Romulo Santana is a 47 y o  female  47 y  o female presenting with continued blue color of feet with pain especially in left foot arch   Patient was first seen for cyanosis of both feet on 06/04/2020  She was sent for venous and arterial duplex scan at that time  Her arterial duplex exhibited evidence suggestive of 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 and left posterior tibial arterial occulusion distally with diminished digital perfusion  She was sent to vascular surgery and was started on apixaban (Eliquis)  Patient was also referred to cardiology and had a Holter monitor performed  Patient did not see the cardiologist because of insurance issues  Had a previous conversation with patient regarding her being a long term smoker who as been increasing smoking due to stress of current situation  Patient reports that she as been on multiple medications for anxiety without positive results so declines medication to assist her with managing anxiety and ability to decrease smoking  Patient had seen referred to hematology for further coagulation work-up but did not follow through with an appointment  Patient reported that the Eliquis was causing her increased anxiety so went off the medication and was told to take at least aspirin 325 mg daily  Patient reports that she as been taking 2 81 mg aspirin instead  Her left foot is starting to become more cyanotic at this time and the pain continues to the point she cannot wear socks or confining shoes       Family History   Problem Relation Age of Onset    Hypertension Mother     Coronary artery disease Father     Heart failure Father     Heart attack Father      Social History     Socioeconomic History    Marital status: /Civil Union     Spouse name: Not on file    Number of children: Not on file    Years of education: Not on file    Highest education level: Not on file   Occupational History    Not on file   Social Needs    Financial resource strain: Not on file    Food insecurity     Worry: Not on file     Inability: Not on file    Transportation needs     Medical: Not on file     Non-medical: Not on file   Tobacco Use  Smoking status: Current Every Day Smoker     Packs/day: 0 50     Years: 30 00     Pack years: 15 00     Types: Cigarettes    Smokeless tobacco: Never Used   Substance and Sexual Activity    Alcohol use: Not Currently    Drug use: Never    Sexual activity: Not Currently     Partners: Male     Birth control/protection: None   Lifestyle    Physical activity     Days per week: Not on file     Minutes per session: Not on file    Stress: Not on file   Relationships    Social connections     Talks on phone: Not on file     Gets together: Not on file     Attends Jehovah's witness service: Not on file     Active member of club or organization: Not on file     Attends meetings of clubs or organizations: Not on file     Relationship status: Not on file    Intimate partner violence     Fear of current or ex partner: Not on file     Emotionally abused: Not on file     Physically abused: Not on file     Forced sexual activity: Not on file   Other Topics Concern    Not on file   Social History Narrative    Caffeine use    Daily coffee consumption     E-Cigarette/Vaping    E-Cigarette Use Never User      E-Cigarette/Vaping Substances    Nicotine No     THC No     CBD No     Flavoring No     Other No     Unknown No      Past Medical History:   Diagnosis Date    Clotting disorder ([facility] [address] )     Depression     Fibroid     GERD (gastroesophageal reflux disease)     Palpitations      Past Surgical History:   Procedure Laterality Date    EGD      MT CYSTOURETHROSCOPY N/A 5/11/2020    Procedure: CYSTOSCOPY;  Surgeon: Mt Young MD;  [facility];  Service: Gynecology    MT EXCIS UTERINE FIBROID, W [address] Rojoe N/A 5/11/2020    Procedure: MYOMECTOMY WITH HYSTEROSCOPY;  Surgeon: Mt Young MD;  [facility];  Service: Gynecology    MT LAP,DIAGNOSTIC ABDOMEN N/A 5/11/2020    Procedure: LAPAROSCOPY DIAGNOSTIC;  Surgeon: Mt Young MD;  [facility];  Service: Gynecology    MT VAG HYST,RMV TUBE/OVARY N/A 5/11/2020    Procedure: TOTAL VAGINAL HYSTERECTOMY WITH BILATERAL SALPINGECTOMY;  Surgeon: Lavelle Farrell MD;  [facility];  Service: Gynecology    WISDOM TOOTH EXTRACTION       Allergies   Allergen Reactions    Eggs Or Egg-Derived Products Abdominal Pain    Zolpidem Confusion       Current Outpatient Medications:     aspirin 325 mg tablet, Take 325 mg by mouth daily, Disp: , Rfl:     cetirizine (ZyrTEC) 10 mg tablet, Take 1 tablet (10 mg total) by mouth daily, Disp: 90 tablet, Rfl: 0    dabigatran etexilate (PRADAXA) 75 mg capsule, Take 1 capsule (75 mg total) by mouth every 12 (twelve) hours, Disp: 60 capsule, Rfl: 2    famotidine (PEPCID) 40 MG tablet, Take 1 tablet (40 mg total) by mouth daily (Patient taking differently: Take 40 mg by mouth daily in the early morning ), Disp: 90 tablet, Rfl: 1    oxyCODONE-acetaminophen (PERCOCET) 5-325 mg per tablet, Take 1 tablet by mouth every 6 (six) hours as needed for severe painMax Daily Amount: 4 tablets, Disp: 30 tablet, Rfl: 0    temazepam (RESTORIL) 30 mg capsule, TAKE 2 CAPSULES BY MOUTH AT BEDTIME, Disp: 60 capsule, Rfl: 0    Review of Systems   Constitutional: Negative for activity change, appetite change and unexpected weight change  HENT: Negative  Eyes: Negative for visual disturbance  Respiratory: Negative for cough, chest tightness, shortness of breath and wheezing  Cardiovascular: Negative for chest pain, palpitations and leg swelling  Gastrointestinal: Negative for abdominal pain, constipation and nausea  Endocrine: Negative for cold intolerance, heat intolerance, polydipsia and polyuria  Genitourinary: Negative  Musculoskeletal: Positive for myalgias  Skin: Positive for color change  Allergic/Immunologic: Negative  Neurological: Positive for weakness and numbness  Negative for dizziness, light-headedness and headaches  Psychiatric/Behavioral: Negative          Objective:    /68   Pulse 78 Temp 98 3 °F (36 8 °C)   Ht 5' 7" (1 702 m)   Wt 65 8 kg (145 lb)   LMP 04/26/2020   BMI 22 71 kg/m² (Reviewed)     Physical Exam  Vitals signs reviewed  Constitutional:       General: She is in acute distress  Appearance: Normal appearance  She is well-developed and well-groomed  She is not ill-appearing  HENT:      Head: Normocephalic and atraumatic  Right Ear: External ear normal       Left Ear: External ear normal       Nose:      Comments: Patient had a facial covering in place as per office protocol    Eyes:      General: Lids are normal       Extraocular Movements: Extraocular movements intact  Conjunctiva/sclera: Conjunctivae normal       Pupils: Pupils are equal, round, and reactive to light  Neck:      Musculoskeletal: Full passive range of motion without pain  Vascular: No carotid bruit  Trachea: Trachea normal    Cardiovascular:      Rate and Rhythm: Normal rate and regular rhythm  Pulses:           Radial pulses are 2+ on the right side and 2+ on the left side  Popliteal pulses are 2+ on the right side and 2+ on the left side  Dorsalis pedis pulses are 1+ on the right side and 1+ on the left side  Posterior tibial pulses are 2+ on the right side and 2+ on the left side  Heart sounds: Normal heart sounds  No murmur  No friction rub  No gallop  Pulmonary:      Effort: Pulmonary effort is normal       Breath sounds: Normal breath sounds  Musculoskeletal:      Right lower leg: No edema  Left lower leg: No edema  Feet:    Skin:     General: Skin is cool and dry  Capillary Refill: Capillary refill takes less than 2 seconds  Coloration: Skin is cyanotic ( bilateral soles of feet left > right)  Neurological:      Mental Status: She is alert and oriented to person, place, and time  Cranial Nerves: Cranial nerves are intact  Sensory: No sensory deficit  Motor: Motor function is intact     Psychiatric: Mood and Affect: Mood normal          Behavior: Behavior normal  Behavior is cooperative

## 2020-08-18 ENCOUNTER — APPOINTMENT (OUTPATIENT)
Dept: LAB | Age: 50
End: 2020-08-18
Payer: COMMERCIAL

## 2020-08-18 ENCOUNTER — TELEPHONE (OUTPATIENT)
Dept: FAMILY MEDICINE CLINIC | Facility: CLINIC | Age: 50
End: 2020-08-18

## 2020-08-18 ENCOUNTER — TRANSCRIBE ORDERS (OUTPATIENT)
Dept: ADMINISTRATIVE | Age: 50
End: 2020-08-18

## 2020-08-18 DIAGNOSIS — I73.9 PERIPHERAL VASCULAR DISEASE, UNSPECIFIED (HCC): ICD-10-CM

## 2020-08-18 DIAGNOSIS — R23.0 CYANOSIS OF SKIN: ICD-10-CM

## 2020-08-18 DIAGNOSIS — I73.9 PERIPHERAL VASCULAR DISEASE, UNSPECIFIED (HCC): Primary | ICD-10-CM

## 2020-08-18 LAB
ALBUMIN SERPL BCP-MCNC: 3.7 G/DL (ref 3.5–5)
ALP SERPL-CCNC: 69 U/L (ref 46–116)
ALT SERPL W P-5'-P-CCNC: 26 U/L (ref 12–78)
ANION GAP SERPL CALCULATED.3IONS-SCNC: 5 MMOL/L (ref 4–13)
APTT PPP: 24 SECONDS (ref 23–37)
AST SERPL W P-5'-P-CCNC: 17 U/L (ref 5–45)
BASOPHILS # BLD AUTO: 0.1 THOUSANDS/ΜL (ref 0–0.1)
BASOPHILS NFR BLD AUTO: 2 % (ref 0–1)
BILIRUB SERPL-MCNC: 0.34 MG/DL (ref 0.2–1)
BUN SERPL-MCNC: 14 MG/DL (ref 5–25)
CALCIUM SERPL-MCNC: 8.8 MG/DL (ref 8.3–10.1)
CHLORIDE SERPL-SCNC: 107 MMOL/L (ref 100–108)
CO2 SERPL-SCNC: 27 MMOL/L (ref 21–32)
CREAT SERPL-MCNC: 0.96 MG/DL (ref 0.6–1.3)
CRP SERPL QL: <3 MG/L
EOSINOPHIL # BLD AUTO: 0.25 THOUSAND/ΜL (ref 0–0.61)
EOSINOPHIL NFR BLD AUTO: 4 % (ref 0–6)
ERYTHROCYTE [DISTWIDTH] IN BLOOD BY AUTOMATED COUNT: 21.2 % (ref 11.6–15.1)
ERYTHROCYTE [SEDIMENTATION RATE] IN BLOOD: 18 MM/HOUR (ref 0–29)
FIBRINOGEN PPP-MCNC: 290 MG/DL (ref 227–495)
GFR SERPL CREATININE-BSD FRML MDRD: 69 ML/MIN/1.73SQ M
GLUCOSE P FAST SERPL-MCNC: 96 MG/DL (ref 65–99)
HCT VFR BLD AUTO: 40.8 % (ref 34.8–46.1)
HGB BLD-MCNC: 12.7 G/DL (ref 11.5–15.4)
IMM GRANULOCYTES # BLD AUTO: 0.01 THOUSAND/UL (ref 0–0.2)
IMM GRANULOCYTES NFR BLD AUTO: 0 % (ref 0–2)
INR PPP: 0.9 (ref 0.84–1.19)
LYMPHOCYTES # BLD AUTO: 1.9 THOUSANDS/ΜL (ref 0.6–4.47)
LYMPHOCYTES NFR BLD AUTO: 30 % (ref 14–44)
MCH RBC QN AUTO: 25.9 PG (ref 26.8–34.3)
MCHC RBC AUTO-ENTMCNC: 31.1 G/DL (ref 31.4–37.4)
MCV RBC AUTO: 83 FL (ref 82–98)
MONOCYTES # BLD AUTO: 0.64 THOUSAND/ΜL (ref 0.17–1.22)
MONOCYTES NFR BLD AUTO: 10 % (ref 4–12)
NEUTROPHILS # BLD AUTO: 3.38 THOUSANDS/ΜL (ref 1.85–7.62)
NEUTS SEG NFR BLD AUTO: 54 % (ref 43–75)
NRBC BLD AUTO-RTO: 0 /100 WBCS
PLATELET # BLD AUTO: 447 THOUSANDS/UL (ref 149–390)
PLATELET # BLD AUTO: 472 THOUSANDS/UL (ref 149–390)
PMV BLD AUTO: 10.5 FL (ref 8.9–12.7)
PMV BLD AUTO: 10.5 FL (ref 8.9–12.7)
POTASSIUM SERPL-SCNC: 4.5 MMOL/L (ref 3.5–5.3)
PROT SERPL-MCNC: 7.9 G/DL (ref 6.4–8.2)
PROTHROMBIN TIME: 12.1 SECONDS (ref 11.6–14.5)
RBC # BLD AUTO: 4.9 MILLION/UL (ref 3.81–5.12)
RETICS # AUTO: NORMAL 10*3/UL (ref 14097–95744)
RETICS # CALC: 1.11 % (ref 0.37–1.87)
SODIUM SERPL-SCNC: 139 MMOL/L (ref 136–145)
THROMBIN TIME: 15.8 SECONDS (ref 14.7–18.4)
WBC # BLD AUTO: 6.28 THOUSAND/UL (ref 4.31–10.16)

## 2020-08-18 PROCEDURE — 86304 IMMUNOASSAY TUMOR CA 125: CPT

## 2020-08-18 PROCEDURE — 85045 AUTOMATED RETICULOCYTE COUNT: CPT

## 2020-08-18 PROCEDURE — 80053 COMPREHEN METABOLIC PANEL: CPT

## 2020-08-18 PROCEDURE — 86140 C-REACTIVE PROTEIN: CPT

## 2020-08-18 PROCEDURE — 85652 RBC SED RATE AUTOMATED: CPT

## 2020-08-18 PROCEDURE — 85610 PROTHROMBIN TIME: CPT

## 2020-08-18 PROCEDURE — 85384 FIBRINOGEN ACTIVITY: CPT

## 2020-08-18 PROCEDURE — 86255 FLUORESCENT ANTIBODY SCREEN: CPT

## 2020-08-18 PROCEDURE — 85025 COMPLETE CBC W/AUTO DIFF WBC: CPT

## 2020-08-18 PROCEDURE — 85049 AUTOMATED PLATELET COUNT: CPT

## 2020-08-18 PROCEDURE — 85730 THROMBOPLASTIN TIME PARTIAL: CPT

## 2020-08-18 PROCEDURE — 85670 THROMBIN TIME PLASMA: CPT

## 2020-08-18 PROCEDURE — 86038 ANTINUCLEAR ANTIBODIES: CPT

## 2020-08-18 PROCEDURE — 36415 COLL VENOUS BLD VENIPUNCTURE: CPT

## 2020-08-18 NOTE — TELEPHONE ENCOUNTER
I as sorry did not know that it only covered 15 or 20 mg tablets so sent in new prescription for 20 mg daily so card can be used

## 2020-08-18 NOTE — TELEPHONE ENCOUNTER
Patient called stating the [facility] card you gave her to get the first 30 days free had the incorrect "mg" on it  She states so now she has nothing  She doesn't know what you would like to do now?    She can be reached at [phone]

## 2020-08-19 LAB — RYE IGE QN: NEGATIVE

## 2020-08-20 ENCOUNTER — TELEPHONE (OUTPATIENT)
Dept: FAMILY MEDICINE CLINIC | Facility: CLINIC | Age: 50
End: 2020-08-20

## 2020-08-20 DIAGNOSIS — D75.839 THROMBOCYTOSIS: Primary | ICD-10-CM

## 2020-08-20 DIAGNOSIS — Q50.39 OVARIAN ANOMALY: ICD-10-CM

## 2020-08-20 LAB
C-ANCA TITR SER IF: NORMAL TITER
MYELOPEROXIDASE AB SER IA-ACNC: <9 U/ML (ref 0–9)
P-ANCA ATYPICAL TITR SER IF: NORMAL TITER
P-ANCA TITR SER IF: NORMAL TITER
PROTEINASE3 AB SER IA-ACNC: <3.5 U/ML (ref 0–3.5)

## 2020-08-22 LAB — CANCER AG125 SERPL-ACNC: 9.1 U/ML (ref 0–38.1)

## 2020-08-28 DIAGNOSIS — I73.00 RAYNAUD'S DISEASE WITHOUT GANGRENE: Primary | ICD-10-CM

## 2020-08-28 RX ORDER — NIFEDIPINE 30 MG/1
30 TABLET, EXTENDED RELEASE ORAL DAILY
Qty: 30 TABLET | Refills: 1 | Status: SHIPPED | OUTPATIENT
Start: 2020-08-28 | End: 2020-09-23 | Stop reason: SDUPTHER

## 2020-09-08 ENCOUNTER — TELEPHONE (OUTPATIENT)
Dept: FAMILY MEDICINE CLINIC | Facility: CLINIC | Age: 50
End: 2020-09-08

## 2020-09-08 DIAGNOSIS — F41.0 PANIC ATTACK: Primary | ICD-10-CM

## 2020-09-08 RX ORDER — ALPRAZOLAM 0.5 MG/1
0.5 TABLET ORAL 2 TIMES DAILY PRN
Qty: 60 TABLET | Refills: 0 | Status: SHIPPED | OUTPATIENT
Start: 2020-09-08 | End: 2020-12-09 | Stop reason: SDUPTHER

## 2020-09-08 NOTE — TELEPHONE ENCOUNTER
Patient is asking if you could please call her regarding her feet? She wouldn't give me much more details but did ask to speak to [facility] about this

## 2020-09-14 DIAGNOSIS — L50.9 URTICARIA: ICD-10-CM

## 2020-09-14 DIAGNOSIS — F51.01 PRIMARY INSOMNIA: ICD-10-CM

## 2020-09-14 RX ORDER — TEMAZEPAM 30 MG/1
CAPSULE ORAL
Qty: 60 CAPSULE | Refills: 0 | Status: SHIPPED | OUTPATIENT
Start: 2020-09-14 | End: 2020-10-11 | Stop reason: SDUPTHER

## 2020-09-14 RX ORDER — CETIRIZINE HYDROCHLORIDE 10 MG/1
10 TABLET ORAL DAILY
Qty: 90 TABLET | Refills: 0 | Status: SHIPPED | OUTPATIENT
Start: 2020-09-14 | End: 2020-12-02 | Stop reason: SDUPTHER

## 2020-09-14 NOTE — TELEPHONE ENCOUNTER
Per PDMP last fill 08/17/20 09/08/2020 1 09/08/2020 ALPRAZOLAM 0 5 MG TABLET 60 0 30 RU [facility] [id number] [facility] ([id number]) 0 [place] Ins [place]   08/17/2020 1 08/17/2020 OXYCODONE-ACETAMINOPHEN 5-325 30 0 7 RU [facility] [id number] [facility] ([id number]) 0 32 14 MME [place]   08/17/2020 1 08/17/2020 TEMAZEPAM 30 MG CAPSULE 60 0 30 [facility] [id number] [facility] ([id number]) 0 [place]

## 2020-09-15 ENCOUNTER — TELEPHONE (OUTPATIENT)
Dept: FAMILY MEDICINE CLINIC | Facility: CLINIC | Age: 50
End: 2020-09-15

## 2020-09-23 ENCOUNTER — OFFICE VISIT (OUTPATIENT)
Dept: FAMILY MEDICINE CLINIC | Facility: CLINIC | Age: 50
End: 2020-09-23
Payer: COMMERCIAL

## 2020-09-23 VITALS
HEART RATE: 72 BPM | WEIGHT: 148 LBS | DIASTOLIC BLOOD PRESSURE: 88 MMHG | BODY MASS INDEX: 23.23 KG/M2 | HEIGHT: 67 IN | TEMPERATURE: 98 F | SYSTOLIC BLOOD PRESSURE: 122 MMHG

## 2020-09-23 DIAGNOSIS — I73.00 RAYNAUD'S DISEASE WITHOUT GANGRENE: ICD-10-CM

## 2020-09-23 DIAGNOSIS — F41.1 GENERALIZED ANXIETY DISORDER: Primary | ICD-10-CM

## 2020-09-23 DIAGNOSIS — D75.839 THROMBOCYTHEMIA: ICD-10-CM

## 2020-09-23 DIAGNOSIS — I99.8 VASCULAR INSUFFICIENCY: ICD-10-CM

## 2020-09-23 PROCEDURE — 99214 OFFICE O/P EST MOD 30 MIN: CPT | Performed by: NURSE PRACTITIONER

## 2020-09-23 PROCEDURE — 3079F DIAST BP 80-89 MM HG: CPT | Performed by: NURSE PRACTITIONER

## 2020-09-23 PROCEDURE — 4004F PT TOBACCO SCREEN RCVD TLK: CPT | Performed by: NURSE PRACTITIONER
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
## 2020-09-24 PROBLEM — Z98.890 STATUS POST MYOMECTOMY: Status: RESOLVED | Noted: 2020-05-11 | Resolved: 2020-09-24

## 2020-09-24 PROBLEM — Z98.890 S/P LAPAROSCOPY: Status: RESOLVED | Noted: 2020-05-11 | Resolved: 2020-09-24

## 2020-09-24 PROBLEM — Z90.710 STATUS POST VAGINAL HYSTERECTOMY: Status: RESOLVED | Noted: 2020-05-11 | Resolved: 2020-09-24

## 2020-09-24 PROBLEM — D25.0 SUBMUCOUS LEIOMYOMA OF UTERUS: Status: RESOLVED | Noted: 2020-05-11 | Resolved: 2020-09-24

## 2020-09-24 PROBLEM — D75.839 THROMBOCYTHEMIA: Status: ACTIVE | Noted: 2020-09-24

## 2020-09-24 PROBLEM — N93.9 ABNORMAL UTERINE BLEEDING (AUB): Status: RESOLVED | Noted: 2020-01-15 | Resolved: 2020-09-24

## 2020-09-24 RX ORDER — NIFEDIPINE 30 MG/1
30 TABLET, EXTENDED RELEASE ORAL DAILY
Qty: 30 TABLET | Refills: 2 | Status: SHIPPED | OUTPATIENT
Start: 2020-09-24 | End: 2020-10-22 | Stop reason: DRUGHIGH

## 2020-10-11 DIAGNOSIS — F51.01 PRIMARY INSOMNIA: ICD-10-CM

## 2020-10-12 RX ORDER — TEMAZEPAM 30 MG/1
CAPSULE ORAL
Qty: 60 CAPSULE | Refills: 0 | Status: SHIPPED | OUTPATIENT
Start: 2020-10-12 | End: 2020-11-13 | Stop reason: SDUPTHER

## 2020-10-22 ENCOUNTER — TELEPHONE (OUTPATIENT)
Dept: FAMILY MEDICINE CLINIC | Facility: CLINIC | Age: 50
End: 2020-10-22

## 2020-10-22 DIAGNOSIS — I75.022 BLUE TOE SYNDROME OF LEFT LOWER EXTREMITY (HCC): ICD-10-CM

## 2020-10-22 DIAGNOSIS — F41.9 ANXIETY: Primary | ICD-10-CM

## 2020-10-22 RX ORDER — PAROXETINE 10 MG/1
10 TABLET, FILM COATED ORAL DAILY
Qty: 30 TABLET | Refills: 3 | Status: SHIPPED | OUTPATIENT
Start: 2020-10-22 | End: 2021-04-21 | Stop reason: ALTCHOICE

## 2020-10-22 RX ORDER — NIFEDIPINE 60 MG/1
60 TABLET, EXTENDED RELEASE ORAL DAILY
Qty: 30 TABLET | Refills: 3 | Status: SHIPPED | OUTPATIENT
Start: 2020-10-22 | End: 2020-12-02 | Stop reason: SDUPTHER

## 2020-11-02 DIAGNOSIS — I73.00 RAYNAUD'S DISEASE WITHOUT GANGRENE: ICD-10-CM

## 2020-11-02 RX ORDER — NIFEDIPINE 30 MG/1
TABLET, EXTENDED RELEASE ORAL
Qty: 30 TABLET | Refills: 1 | OUTPATIENT
Start: 2020-11-02

## 2020-11-13 DIAGNOSIS — I73.89 ACROCYANOSIS (HCC): Primary | ICD-10-CM

## 2020-11-13 DIAGNOSIS — F51.01 PRIMARY INSOMNIA: ICD-10-CM

## 2020-11-13 DIAGNOSIS — F17.200 CURRENT EVERY DAY SMOKER: ICD-10-CM

## 2020-11-13 RX ORDER — TEMAZEPAM 30 MG/1
CAPSULE ORAL
Qty: 60 CAPSULE | Refills: 0 | Status: SHIPPED | OUTPATIENT
Start: 2020-11-13 | End: 2020-12-09 | Stop reason: SDUPTHER

## 2020-12-02 DIAGNOSIS — L50.9 URTICARIA: ICD-10-CM

## 2020-12-02 DIAGNOSIS — I75.022 BLUE TOE SYNDROME OF LEFT LOWER EXTREMITY (HCC): ICD-10-CM

## 2020-12-02 RX ORDER — NIFEDIPINE 60 MG/1
60 TABLET, EXTENDED RELEASE ORAL DAILY
Qty: 30 TABLET | Refills: 3 | Status: SHIPPED | OUTPATIENT
Start: 2020-12-02 | End: 2021-03-02 | Stop reason: SDUPTHER

## 2020-12-02 RX ORDER — CETIRIZINE HYDROCHLORIDE 10 MG/1
10 TABLET ORAL DAILY
Qty: 90 TABLET | Refills: 0 | Status: SHIPPED | OUTPATIENT
Start: 2020-12-02 | End: 2021-03-02

## 2020-12-08 ENCOUNTER — LAB (OUTPATIENT)
Dept: LAB | Age: 50
End: 2020-12-08
Payer: COMMERCIAL

## 2020-12-08 DIAGNOSIS — I73.89 ACROCYANOSIS (HCC): ICD-10-CM

## 2020-12-08 LAB
FERRITIN SERPL-MCNC: 22 NG/ML (ref 8–388)
FOLATE SERPL-MCNC: >20 NG/ML (ref 3.1–17.5)
IRON SATN MFR SERPL: 21 %
IRON SERPL-MCNC: 89 UG/DL (ref 50–170)
TIBC SERPL-MCNC: 417 UG/DL (ref 250–450)
VIT B12 SERPL-MCNC: 250 PG/ML (ref 100–900)

## 2020-12-08 PROCEDURE — 83540 ASSAY OF IRON: CPT

## 2020-12-08 PROCEDURE — 36415 COLL VENOUS BLD VENIPUNCTURE: CPT

## 2020-12-08 PROCEDURE — 82607 VITAMIN B-12: CPT

## 2020-12-08 PROCEDURE — 83550 IRON BINDING TEST: CPT

## 2020-12-08 PROCEDURE — 82728 ASSAY OF FERRITIN: CPT

## 2020-12-08 PROCEDURE — 84443 ASSAY THYROID STIM HORMONE: CPT

## 2020-12-08 PROCEDURE — 82746 ASSAY OF FOLIC ACID SERUM: CPT

## 2020-12-09 DIAGNOSIS — F51.01 PRIMARY INSOMNIA: ICD-10-CM

## 2020-12-09 DIAGNOSIS — F41.0 PANIC ATTACK: ICD-10-CM

## 2020-12-09 DIAGNOSIS — R53.83 FATIGUE, UNSPECIFIED TYPE: Primary | ICD-10-CM

## 2020-12-09 DIAGNOSIS — G62.9 NEUROPATHY: ICD-10-CM

## 2020-12-09 LAB — TSH SERPL DL<=0.05 MIU/L-ACNC: 2.12 UIU/ML (ref 0.36–3.74)

## 2020-12-09 RX ORDER — ALPRAZOLAM 0.5 MG/1
0.5 TABLET ORAL 2 TIMES DAILY PRN
Qty: 60 TABLET | Refills: 0 | Status: SHIPPED | OUTPATIENT
Start: 2020-12-09 | End: 2021-01-04 | Stop reason: SDUPTHER

## 2020-12-09 RX ORDER — TEMAZEPAM 30 MG/1
CAPSULE ORAL
Qty: 60 CAPSULE | Refills: 0 | Status: SHIPPED | OUTPATIENT
Start: 2020-12-09 | End: 2021-01-14 | Stop reason: SDUPTHER

## 2021-01-04 DIAGNOSIS — F41.0 PANIC ATTACK: ICD-10-CM

## 2021-01-04 DIAGNOSIS — K21.9 GASTROESOPHAGEAL REFLUX DISEASE, ESOPHAGITIS PRESENCE NOT SPECIFIED: ICD-10-CM

## 2021-01-04 DIAGNOSIS — K21.9 GASTROESOPHAGEAL REFLUX DISEASE: ICD-10-CM

## 2021-01-04 RX ORDER — FAMOTIDINE 40 MG/1
40 TABLET, FILM COATED ORAL
Qty: 90 TABLET | Refills: 3 | Status: SHIPPED | OUTPATIENT
Start: 2021-01-04 | End: 2021-02-08 | Stop reason: SDUPTHER

## 2021-01-04 RX ORDER — ALPRAZOLAM 0.5 MG/1
0.5 TABLET ORAL 2 TIMES DAILY PRN
Qty: 60 TABLET | Refills: 0 | Status: SHIPPED | OUTPATIENT
Start: 2021-01-04 | End: 2021-02-08 | Stop reason: SDUPTHER

## 2021-01-04 RX ORDER — FAMOTIDINE 40 MG/1
40 TABLET, FILM COATED ORAL DAILY
Qty: 90 TABLET | Refills: 0 | Status: CANCELLED | OUTPATIENT
Start: 2021-01-04

## 2021-01-08 DIAGNOSIS — F33.1 MODERATE EPISODE OF RECURRENT MAJOR DEPRESSIVE DISORDER (HCC): Primary | ICD-10-CM

## 2021-01-11 ENCOUNTER — PATIENT OUTREACH (OUTPATIENT)
Dept: FAMILY MEDICINE CLINIC | Facility: CLINIC | Age: 51
End: 2021-01-11

## 2021-01-11 NOTE — PROGRESS NOTES
Referral received from Rogerio [address] with [address] with request for [facility] SW to outreach patient and assess needs  Per chart review and Randolph Dobbs, patient is interested in getting established with a mental health provider and may also need assistance in applying for Medical Assistance  Patient currently has [employer]  OPCM SW attempted to call patient at the number on file; no answer and did not leave a voicemail as patient now has new phone number per chart review  OPCM SW attempted to call patient at new phone number [phone]; no answer and unable to leave voicemail as mailbox is not set up  OPCM SW will reattempt outreach at later time  _______________________    Return message received from patient (OPCM SW was unavailable to answer at the time)  OPCM SW placed return call to patient; no answer, unable to leave voicemail as mailbox is not set up  Will await return call

## 2021-01-14 ENCOUNTER — PATIENT OUTREACH (OUTPATIENT)
Dept: FAMILY MEDICINE CLINIC | Facility: CLINIC | Age: 51
End: 2021-01-14

## 2021-01-14 DIAGNOSIS — F51.01 PRIMARY INSOMNIA: ICD-10-CM

## 2021-01-14 RX ORDER — TEMAZEPAM 30 MG/1
CAPSULE ORAL
Qty: 60 CAPSULE | Refills: 0 | Status: SHIPPED | OUTPATIENT
Start: 2021-01-14 | End: 2021-03-02 | Stop reason: SDUPTHER

## 2021-01-14 NOTE — PROGRESS NOTES
OPCM YADIRA attempted 2nd outreach to patient at [phone] to assess needs including getting established with mental health provider and assistance in applying for Medical Assistance  No answer and unable to leave voicemail as mailbox is not set up  Will attempt additional outreach next week  Patient often communicates with PCP office through [employer]; OPCSAMSON MAY does not have access to communicate with patient through this platform  Will relay update to Cozi

## 2021-01-20 ENCOUNTER — PATIENT OUTREACH (OUTPATIENT)
Dept: FAMILY MEDICINE CLINIC | Facility: CLINIC | Age: 51
End: 2021-01-20

## 2021-01-20 DIAGNOSIS — Z78.9 NEEDS ASSISTANCE WITH COMMUNITY RESOURCES: Primary | ICD-10-CM

## 2021-01-20 NOTE — PROGRESS NOTES
3rd outreach made to patient at [phone] to assess needs including getting established with mental health provider and assistance in applying for [address]  Patient answered but asked to call OPCM SW back at a later time as she is watching the Inauguration  OPCM SW agreeable and will await patient's return call to further assess needs  __________________    Return call received from patient stating she is needing assistance with insurance coverage and getting connected with a mental health provider  Patient and her  are in the process of getting , she is currently on her 's insurance plan [facility]), has 2 children, has been feeling depressed, and is unable to work due to her depression and other underlying medical conditions  Patient has been wanting to apply for SSD but states she has not been able to do this on her own and is requesting assistance  OPCM SW will refer to Curtis Zamudio 3 to assist with SSD application  Patient stated she will be losing her 's insurance once the divorce is finalized as she is unbale to work and unable to afford to pay for the [facility]  OPC SW will request Curtis Ibarrao 3 also assist with Medical Assistance and SNAP application  Patient requested Rúedu bIarrao 3 email her to inform when she will be calling to begin this process  Patient is not currently established with a Mental Health Provider but is interested in getting connected with a therapist and psychiatrist   Patient attempted to become established with Jose Armando Everett through PCP office but was informed that he does not take her insurance at this time  OPCSAMSON MAY will email [facility] list [facility]  Providers as well as the [facility] Provider list to review  Patient is agreeable to this plan  Patient denied any additional needs at this time  OPCM SW will refer to Curtis Ibarrao 3 and will follow-up with patient in 1-2 weeks

## 2021-02-01 ENCOUNTER — PATIENT OUTREACH (OUTPATIENT)
Dept: FAMILY MEDICINE CLINIC | Facility: CLINIC | Age: 51
End: 2021-02-01

## 2021-02-01 ENCOUNTER — PATIENT OUTREACH (OUTPATIENT)
Dept: CASE MANAGEMENT | Facility: OTHER | Age: 51
End: 2021-02-01

## 2021-02-01 NOTE — PROGRESS NOTES
CHW reached out to patient this day to assist with MA/SNAP; SSD/SSI applications this day  CHW was able to complete MA/SNAP giovanna this day via [facility] webpage  Will upload application to this encounter later on this week  I explained to Nimo Adame the next steps and that [facility] will reach out to her within a day or two to go over application and see if she qualifies for Emergency SNAP  Nimo Adame agreed and understood  I told her I would be in contact with her to let her know if DHS I requiring proof of documents for her application  Patient agreed  Nimo Adame also agreed to complete SSD/SSI application another day since this application took a little longer today  CHW agreed  We agreed I would call her on Thursday at the same time; 11AM  Will inform OPCM YADIRA Moreno CHW has uploaded application to this encounter for review if needed

## 2021-02-01 NOTE — PROGRESS NOTES
OPCM SW attempted to outreach patient to check status and follow-up regarding [address] Provider information that was emailed to patient to review  No answer and unable to leave voicemail as patient's mailbox is not set up; will attempt additional outreach at later date  CHW Beba to assist patient with MA/SNAP and SSD applications

## 2021-02-03 ENCOUNTER — PATIENT OUTREACH (OUTPATIENT)
Dept: FAMILY MEDICINE CLINIC | Facility: CLINIC | Age: 51
End: 2021-02-03

## 2021-02-03 NOTE — PROGRESS NOTES
ROBERT MYA was informed by Isabella Bender that patient mentioned need for community resources for her son  OPCM SW outreached patient to further discuss this need  Patient confirmed she is interested in obtaining community resources that would be helpful for her 12year old son with high functioning autism  Patient stated she spoke with [facility] but was informed there were not many resources in the area for her son  OPCM SW discussed some additional community resources with patient; patient is agreeable to [facility] emailing these resources/information to her for review  OPCSAMSON MAY will provide patient with information for the [facility] for [address] ([facility]), [address] ([address]), [address], and [place]  Patient also stated she spoke with Isabella Bender on 2/1/2021 to complete Medical Assistance and SNAP Application  Patient received a call from someone who stated she will be receiving a SNAP card in the mail and also stated she and her dtr will [facility] [place] while her son will have [place]; patient was unable to remember who called to inform her of this (was likely a DHS )  Patient had not yet reviewed the Mental Health Provider list [facility] emailed to her to review 2 weeks ago  ROBERT MAY reminded patient to review the [facility] Provider List now that MA Application is in process and encouraged patient to outreach [facility] if assistance is needed in getting appointment scheduled with a therapist/psychiatrist   Patient understanding and stated she has another call scheduled with DARYL Perez tomorrow 2/4/2021 to begin [facility] Application  ROBERT MAY will follow-up with patient in roughly 2 weeks

## 2021-02-04 ENCOUNTER — PATIENT OUTREACH (OUTPATIENT)
Dept: CASE MANAGEMENT | Facility: OTHER | Age: 51
End: 2021-02-04

## 2021-02-04 NOTE — PROGRESS NOTES
CHW reached out to patient this day to assist with SSD/SSI application  CHW was able to complete both applications online this day  I explained to Lorne Barclay the next steps and that she should be receiving in the mail 2 authorization for disclosure of personal information forms for the SSD/SSI application, along with a signature page for her to sign her application since I can not sign it on her behalf  I also mentioned to Lorne Barclay that she should make copies of these forms before she sends it to "[employer] ( [address] [place] [address] can tend to loose documents in the mail)  Lorne Barclay understood  While assisting Lorne Barclay, she asked if I would be able to assist her in completing a SSI application for her son Shahida Avery since she was told he may be eligible for benefits as will  I informed Catherine Bell could assist her with this, the only thing is that she would need to get PCP information, any other providers Shahida Avery has seen like [facility] (where he was diagnosed)  Lorne Barclay understood and stated it may be difficult to get this information because her  took a bunch of documents before they   I explained she should try and get any information she can for this application  We agreed I would call her on Monday 02/08/2021 at 11AM to help her with this application for Shahida Aevry  CHW will inform ROBERT Gonzalez of this as well  No other concerns at this moment

## 2021-02-08 ENCOUNTER — PATIENT OUTREACH (OUTPATIENT)
Dept: CASE MANAGEMENT | Facility: OTHER | Age: 51
End: 2021-02-08

## 2021-02-08 DIAGNOSIS — K21.9 GASTROESOPHAGEAL REFLUX DISEASE: ICD-10-CM

## 2021-02-08 DIAGNOSIS — F41.0 PANIC ATTACK: ICD-10-CM

## 2021-02-08 RX ORDER — ALPRAZOLAM 0.5 MG/1
0.5 TABLET ORAL 2 TIMES DAILY PRN
Qty: 60 TABLET | Refills: 0 | Status: SHIPPED | OUTPATIENT
Start: 2021-02-08 | End: 2021-03-11 | Stop reason: SDUPTHER

## 2021-02-08 RX ORDER — FAMOTIDINE 40 MG/1
40 TABLET, FILM COATED ORAL
Qty: 90 TABLET | Refills: 0 | Status: SHIPPED | OUTPATIENT
Start: 2021-02-08 | End: 2021-06-02 | Stop reason: SDUPTHER

## 2021-02-08 NOTE — PROGRESS NOTES
CHW called patient this day to start [facility] application for Emy's son Sunday Darius  Unfortunately, since Marjan Mclaughlin doesn't know nor have Sunday Darius' or her daughter Sylwia's SSN cards, I could not complete the application  Instead, I offered to send Marjan Mclaughlin 2 SSN card applications for her children  She agreed and I also provided her with [facility] Medical Records Dept phone number to obtain medical records as proof of age and identitty for the SSN card applications, as well as ask for birth certificates  I informed Marjan Mclaughlin I will be in my office tomorrow, so it will be mailed out to he then  Marjan Mclaughlin agreed and understood  CHW will follow-up with her in about a week  Will also inform ROBERT Morrison

## 2021-02-09 ENCOUNTER — PATIENT OUTREACH (OUTPATIENT)
Dept: FAMILY MEDICINE CLINIC | Facility: CLINIC | Age: 51
End: 2021-02-09

## 2021-02-09 NOTE — PROGRESS NOTES
ROBERT MAY outreached patient to check status  Patient stated she is doing fine, received the email with community resources for her son, but stated she did not see the [facility] Provider List that was included and would still like to get established with a [place] [address] Agency  ROBERT MAY discussed various [place] [address] Agencies in the [facility] area per patient's request   Patient asked for [facility] YADIRA's assistance to call [facility] and schedule intake appointment  ROBERT MAY outreached [facility] with request to schedule patient's first appointment  [facility] to call patient and verify insurance  Patient is aware and will await call  Curtis Zamudio 3 assisted patient with SSI/SSD application  ROBERT MAY to follow-up with patient in 2 weeks

## 2021-02-22 ENCOUNTER — PATIENT OUTREACH (OUTPATIENT)
Dept: CASE MANAGEMENT | Facility: OTHER | Age: 51
End: 2021-02-22

## 2021-02-23 ENCOUNTER — PATIENT OUTREACH (OUTPATIENT)
Dept: FAMILY MEDICINE CLINIC | Facility: CLINIC | Age: 51
End: 2021-02-23
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
## 2021-02-23 NOTE — PROGRESS NOTES
CHW reached out to patient via email (patient preference) to see if there is a day I can speak to her via phone regarding the Social Security Card applications I sent to her and other things we touched upon last time I spoke with her  Rebeca Uribe emailed me back and we agreed that on Thursday 02/25 at [id number] would work  Will inform ROBERT Murphy as well

## 2021-02-24 ENCOUNTER — TELEPHONE (OUTPATIENT)
Dept: BEHAVIORAL/MENTAL HEALTH CLINIC | Facility: CLINIC | Age: 51
End: 2021-02-24

## 2021-02-24 NOTE — TELEPHONE ENCOUNTER
This writer spoke with pt to provide information on availability for psychotherapy  Pt states she will look at her schedule and make an appointment with  sometime in the near future

## 2021-02-25 ENCOUNTER — PATIENT OUTREACH (OUTPATIENT)
Dept: CASE MANAGEMENT | Facility: OTHER | Age: 51
End: 2021-02-25
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
## 2021-03-02 DIAGNOSIS — F51.01 PRIMARY INSOMNIA: ICD-10-CM

## 2021-03-02 DIAGNOSIS — I75.022 BLUE TOE SYNDROME OF LEFT LOWER EXTREMITY (HCC): ICD-10-CM

## 2021-03-02 DIAGNOSIS — L50.9 URTICARIA: ICD-10-CM

## 2021-03-02 RX ORDER — TEMAZEPAM 30 MG/1
CAPSULE ORAL
Qty: 60 CAPSULE | Refills: 2 | Status: SHIPPED | OUTPATIENT
Start: 2021-03-02 | End: 2021-04-09 | Stop reason: SDUPTHER

## 2021-03-02 RX ORDER — CETIRIZINE HYDROCHLORIDE 10 MG/1
TABLET ORAL
Qty: 90 TABLET | Refills: 0 | Status: SHIPPED | OUTPATIENT
Start: 2021-03-02 | End: 2021-06-01

## 2021-03-02 RX ORDER — NIFEDIPINE 60 MG/1
60 TABLET, EXTENDED RELEASE ORAL DAILY
Qty: 30 TABLET | Refills: 4 | Status: SHIPPED | OUTPATIENT
Start: 2021-03-02 | End: 2021-06-02 | Stop reason: SDUPTHER

## 2021-03-09 ENCOUNTER — PATIENT OUTREACH (OUTPATIENT)
Dept: FAMILY MEDICINE CLINIC | Facility: CLINIC | Age: 51
End: 2021-03-09

## 2021-03-09 NOTE — PROGRESS NOTES
OPCSAMSON MAY outreached patient to check status  Patient has appointment with Basilia Badillo tomorrow 3/9 at PCP office for first therapy session  Patient would like to see how things go tomorrow but plans to discuss with Shelia Barreto the possibility of her children also meeting with him for sessions  Patient shared that her son declined outreaching the community resources that are geared toward individuals with autism at this time  Patient has been overwhelmed with the paperwork she is needing to complete for her  regarding the divorce and also needs to complete SSN applications for her children to request new cards & her SSA paperwork  OPCSAMSON MAY provided support to patient  OPCSAMSON MAY discussed available support groups for individuals experiencing the stressors of divorce but patient would like to wait until first therapy session with Shelia Barreto occurs before considering any alternate resources  Will route this update to Shelia Barreto and [address]  Patient agreed for [facility] to follow-up again next week

## 2021-03-10 ENCOUNTER — SOCIAL WORK (OUTPATIENT)
Dept: BEHAVIORAL/MENTAL HEALTH CLINIC | Facility: CLINIC | Age: 51
End: 2021-03-10
Payer: COMMERCIAL

## 2021-03-10 DIAGNOSIS — F41.1 GENERALIZED ANXIETY DISORDER: Primary | ICD-10-CM

## 2021-03-10 PROCEDURE — 90834 PSYTX W PT 45 MINUTES: CPT | Performed by: SOCIAL WORKER

## 2021-03-10 NOTE — PSYCH
Assessment/Plan:      Diagnoses and all orders for this visit:    Generalized anxiety disorder          Subjective:     Patient ID: Charmayne Lout is a 45 y o  female presenting to this writer with ongoing anxiety and depressed mood  Pt reports living with her two teenage children, son who is 12 and duaghter who is 15  Pt reports her  left the marriage towards the end of the summer last year  Pt states the children are with her  Pt is currently going through a divorce  Pt states her  is still paying the bills and she is able to stay in her house for the time being  Pt reports her name is not on the deed of the house   is still free to come and go as he pleases which elevated pt's anxiety  Pt reports that she has not worked for many years and focused on the children  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  Pt enjoyed her work, but wanted to concentrate on the children while they were young  Pt states ongoing stressors of recent hysterectomy, blood clots in her ankle leading to hospitalization  Pt reports that last few months have been extremely stressful  Pt reports that her children faced extensive bullying at school  Pt states her eldest son has high functioning autism and is brilliant  Pt was teased and told by his classmates from a young age that he should kill himself  Pt reports that some of the classmates had siblings that were in pt's daughters age group and the bullying transferred to pt's daughter  Both children were bullied extensively and the school did not do anything about it  Finally, pt took her children out of school and home schooled them  Pt reports both her children struggle with confidence issues  Daughter is very reclusive and does not talk much  Pt reports she does not have any natural supports and no friends  Pt's mother is alive but she has never been loving towards pt   Pt has two brothers who live with their mother, but they are dysfunctional  Pt's father  about 30 years ago  Pt reports growing up with a lot of anxiety and fear  Pt states she does not remember receiving any love as a child  Even today, mother is cold and evasive  Pt is in contact with one Uncle who she has never met, via facebook, and she has started to try and explore why her mother is the way she is  Pt states she has not found any answers yet  Pt seems paranoid at times  Reports her  has hacked into computers and monitors the house from a distance  Pt reports that her  is the one that is paranoid  Pt reports that when  decided to leave, he told the police that he was not safe and he just needed to return home to  his belongings  Pt reports she had a meal ready for him and her  arrived with the police officers and picked up his belongings and moved to [place], [place]  Pt reports she knew the marriage was struggling, but she felt like they could have arranged a better ending to the marriage than involving police officers  Pt reports this event effected her children negatively  Pt states her immediate stressor is court case in two weeks time  Pt is also hoping to get the financial situation settled and child support determined  Pt reports that she has been struggling financially as she does not have income apart from what her  gives her       HPI    Review of Systems      Objective:     Physical Exam

## 2021-03-11 ENCOUNTER — PATIENT OUTREACH (OUTPATIENT)
Dept: CASE MANAGEMENT | Facility: OTHER | Age: 51
End: 2021-03-11

## 2021-03-11 ENCOUNTER — PATIENT OUTREACH (OUTPATIENT)
Dept: FAMILY MEDICINE CLINIC | Facility: CLINIC | Age: 51
End: 2021-03-11

## 2021-03-11 DIAGNOSIS — F41.0 PANIC ATTACK: ICD-10-CM

## 2021-03-11 RX ORDER — ALPRAZOLAM 0.5 MG/1
0.5 TABLET ORAL 2 TIMES DAILY PRN
Qty: 60 TABLET | Refills: 0 | Status: SHIPPED | OUTPATIENT
Start: 2021-03-11 | End: 2021-04-09 | Stop reason: SDUPTHER

## 2021-03-11 NOTE — PROGRESS NOTES
Informed by Hubert Palafox that patient received notification of 2 scheduled therapy appts with Ernie Harman for 3/24 (11AM and 2PM)  Spoke with office staff who will outreach patient to confirm preferred time  Patient also informed Hubert Palafox of emotions that presented after yesterday's therapy session (patient was unable to sleep and was crying)  OPCM SW will follow-up with patient at later date as she informed Hubert Palafox she would prefer team not call today

## 2021-03-11 NOTE — PROGRESS NOTES
CHW called patient to follow-up and see if she was able to call [facility] to request medical records for her kids Social security card applications  Adolph Mancia informed me she hasn't completed that yet but plans to do that today and apologized for delaying this  I told her it was no problem at all, and that we can go at her pace  I asked her how things went at her Psych appt with Ameena and she informed me it went very well, however, just having her talk about it gave her anxiety and she was up until 3AM crying for no reason  I asked her if she's taking the Alprazolam prescribed and she stated she is (requested a refill this morning as well)  Adolph Mancia mentioned not liking taking medications but the Alprazolam does help and calm her down when she is at home  CHW agreed and just listened to Emy's concerns  She then asked me if I could clarify what time her appt with Ameena is on 03/24/21, she was confused because she received a [employer] message that theres two the same day  I told Adolph Mancia that I'm not sure why both were scheduled but that I can ask OPCM SW Johan Del Castillo to see if we can find out why and or get it fixed  Adolph Mancia agreed and understood  She prefers if to be either emailed or sent a notification through [address] [place] [address]  Lastly, Anne Puentes know for her to call me when she is ready to start the applications for her children's SSN cards and or when she has the medical charts available  Adolph Mancia agreed  Will update OPCM SW of this conversation today      ----------------  Reached out to [facility] and let her know the above and regarding the 2 appts  Johan Del Castillo will get in contact with provider to either correct or call patient      ---------------  Received update from Johan Del Castillo that Psych will reach out to patient to pick preferred time for appt

## 2021-03-18 ENCOUNTER — PATIENT OUTREACH (OUTPATIENT)
Dept: CASE MANAGEMENT | Facility: OTHER | Age: 51
End: 2021-03-18

## 2021-03-18 ENCOUNTER — PATIENT OUTREACH (OUTPATIENT)
Dept: FAMILY MEDICINE CLINIC | Facility: CLINIC | Age: 51
End: 2021-03-18

## 2021-03-18 NOTE — PROGRESS NOTES
OPCM SW attempted to outreach patient, check status, and follow-up from last week's psychotherapy appointment with Jose Raul Baumann at PCP office    No answer, voicemail left for patient, and awaiting return call to further assist

## 2021-03-19 ENCOUNTER — PATIENT OUTREACH (OUTPATIENT)
Dept: FAMILY MEDICINE CLINIC | Facility: CLINIC | Age: 51
End: 2021-03-19

## 2021-03-19 NOTE — PROGRESS NOTES
CHW received text message from patient letting me know that she received documents from the [address] [place] [address] requesting information about her job history  Rick Sabillon stated she is not sure how to fill it out since she doesn't remember dates, phone numbers or addresses of where she worked years ago  CHW was not able to get back to patient this day due to being busy with other cases  ------------------  CHW spoke with [facility] today regarding the above and the documents Rick Sabillon received  I explained to [facility] that it's best Rickrafaela Riverbryan still submit the forms to the best of her knowledge  [facility] informed me that patient would be bust the rest of the day today so that it's best to text patient instead  CHW agreed  -------------------    CHW texted Rick Sabillon explaining the above and she responded back that she's very appreciative for our help and that she contacted [facility], had to leave a message and stated she is not able to complete the documents due to last working in 2006 and she has no information other information  Rickrafaela Sabillon is waiting for a call back regarding this  Will inform OPCM SW

## 2021-03-19 NOTE — PROGRESS NOTES
Voicemail received from patient in response to [facility] SW's outreach yesterday  OPCM SW returned call to patient  Patient stated she is overwhelmed, has not been sleeping well, and is anxious about upcoming custody hearing next week 3/25  Patient's  will be present for this hearing and patient has follow-up call with her  this afternoon  OPCM SW provided support to patient and listened to concerns  Patient is looking forward to therapy appointment with Mitchell Monreal at PCP office next week (scheduled for 3/24; the day before her custody hearing)  Patient also has questions about SSD application process as she spoke with someone over the phone but then also received paperwork in the mail requesting dates/salary information from her previous jobs  Patient stated she is unable to recall this information and is unsure what to do next  Will discuss with DARYL Yoder (patient requested Hubert Palafox text her as she will be awaiting call from her )  Patient stated she may call SS today to address questions over the phone as well  Patient appreciative for support from DARYL Schmitt, Mitchell Monreal, and Miri Aguirre    Will route update to care team

## 2021-03-24 ENCOUNTER — SOCIAL WORK (OUTPATIENT)
Dept: BEHAVIORAL/MENTAL HEALTH CLINIC | Facility: CLINIC | Age: 51
End: 2021-03-24
Payer: COMMERCIAL

## 2021-03-24 DIAGNOSIS — F33.1 MODERATE EPISODE OF RECURRENT MAJOR DEPRESSIVE DISORDER (HCC): Primary | ICD-10-CM

## 2021-03-24 PROCEDURE — 90834 PSYTX W PT 45 MINUTES: CPT | Performed by: SOCIAL WORKER

## 2021-03-24 NOTE — PSYCH
Assessment/Plan:      Diagnoses and all orders for this visit:    Moderate episode of recurrent major depressive disorder (HCC)          Subjective:     Patient ID: Avtar Salinas is a 45 y o  female presenting to this writer with ongoing anxiety and depression  Pt states she is very stressed and anxious about the court hearing tomorrow where custody will be determined for her two children aged 12 and 15  Pt states this is currently taking up most of her concerns and thoughts  This writer talked about how pt is going to get through the court date  Pt is not sure  Pt reports she finds herself not sleeping well, crying, feeling like she does not have the strength to do her best tomorrow  This writer encouraged pt by helping her to vocalize all the milestones she has got through over the past several weeks and months  Pt is also able to identify the importance of speaking positive things over her life  Pt states she knows she is going to make it through tomorrow- she just has to put one step in front of the other and rely on her   This writer talked with pt about grounding techniques, deep breathing techniques and the importance of listening to calming music or guided meditations  Pt encouraged to prepare her mind with these calming and grounding techniques prior to the court date  Pt states she also has a plan to listen to positive upbeat music prior to the court date tomorrow morning to boost her mood and increase positive energy  Pt also reports other ongoing stressors such as mother, lack of supports, feeling overwhelmed with financial situation  Pt encouraged to initially focus on tomorrow and then gradually come up with goals and solutions to those other things which need to be taken care of  Pt reports her secondary concerns are her 15year old daughter, her 12year old son- SSD application- possibly looking for a new job and ways to reduce the anxiety she faces on a day to day basis   Pt reports some hypervigilance regarding her estranged   Pt states she is scared of him and is concerned he is stalking the property, opening mail etc  Pt states she sometimes finds herself barricading the door just in case he tried to get in  Again, with corroboration it is difficult to assess whether this is paranoia or reality based fear  Pt report she will follow up with this writer in two weeks time  HPI    Review of Systems      Objective:     Physical Exam  This writer spent 45 minutes with pt from 11am to 11:45  Appearance: casually dressed good eye contact; speech: normal pitch and normal volume; behavior: normal, thought process: logical and goal directed, thought content: denies SI/HI, perceptions: no delusions of AH/VH- some hints of paranoia, mood: slightly anxious and appears paranoid at times, affect: congruent, orientated x4, insight/judgement: good

## 2021-04-01 ENCOUNTER — PATIENT OUTREACH (OUTPATIENT)
Dept: FAMILY MEDICINE CLINIC | Facility: CLINIC | Age: 51
End: 2021-04-01

## 2021-04-01 NOTE — PROGRESS NOTES
OPCM SW outreached patient to check status and follow-up after last week's custody hearing  Patient stated she is doing okay and that the father of her two children was granted custody on [de-identified] and Wednesday's; patient stated her children do not want to go with their father on these days so patient is working with her  on next steps to take  Patient stated he visited her home to talk with his children outside and relayed that the conversation was unpleasant; informed her  of this as well  Patient stated she received the medical portion of her [employer] to discuss next steps  CHNORRIS Yoder to outreach patient tomorrow 4/2 at 11AM to further assist, per patient  Patient also has upcoming therapy session with Natasha Enriquez through PCP office on 4/7  Patient plans to discuss appointment scheduling for her children to also see Natasha Enriquez separately while at this session  Patient informed OPCSAMSON MAY that her legs are giving her difficulty lately (related this to reason weather and underlying stress; also stated ambulating steps has been increasingly difficult for her)  Patient requested OPCM SW relay this information to [employer]  OPCM SW to follow-up in roughly 2 weeks to check status

## 2021-04-02 ENCOUNTER — PATIENT OUTREACH (OUTPATIENT)
Dept: CASE MANAGEMENT | Facility: OTHER | Age: 51
End: 2021-04-02

## 2021-04-02 NOTE — PROGRESS NOTES
CHW received text message from [employer] app[employer] yesterday afternoon asking me if I can rech out to her today regarding some questions she has pertaining to forms she received from the [employer]  CHW agreed and we scheduled for 11AM     ------------------  CHW reached out to patient today and Marlon Padilla informed me SSA sent a Medical Questionnaire  Marlon Padilla was confused about how to answer the questions since she has more than 1 medical condition  I explained to patient that she can be as detailed as needed and that she should be as honest as could be with the questions  CHW also received the same questionnaire for patient from [place]  CHW will be submitting this next week  Lennox Norton know she should also explain how her mental health conditions make daily tasks difficult as well  Marlon Padilla agreed  No other concerns at the moment  Will f/u with patient next week to see if she submitted this  Will update [facility]

## 2021-04-05 ENCOUNTER — TELEPHONE (OUTPATIENT)
Dept: FAMILY MEDICINE CLINIC | Facility: CLINIC | Age: 51
End: 2021-04-05

## 2021-04-05 NOTE — TELEPHONE ENCOUNTER
Patient wanted to let you know she is unable to make appointments in person due to her  not registering her car or getting it inspected

## 2021-04-07 ENCOUNTER — TELEMEDICINE (OUTPATIENT)
Dept: BEHAVIORAL/MENTAL HEALTH CLINIC | Facility: CLINIC | Age: 51
End: 2021-04-07
Payer: COMMERCIAL

## 2021-04-07 DIAGNOSIS — F32.A ANXIETY AND DEPRESSION: Primary | ICD-10-CM

## 2021-04-07 DIAGNOSIS — F41.9 ANXIETY AND DEPRESSION: Primary | ICD-10-CM

## 2021-04-07 PROCEDURE — 90832 PSYTX W PT 30 MINUTES: CPT | Performed by: SOCIAL WORKER

## 2021-04-07 NOTE — PSYCH
Virtual Regular Visit      Assessment/Plan:    Problem List Items Addressed This Visit     None               Reason for visit is No chief complaint on file  Encounter provider Dona Silva    Provider located at [facility]  [place] [address] [address]      Recent Visits  No visits were found meeting these conditions  Showing recent visits within past 7 days and meeting all other requirements     Today's Visits  Date Type Provider Dept   04/07/21 [facility] today's visits and meeting all other requirements     Future Appointments  No visits were found meeting these conditions  Showing future appointments within next 150 days and meeting all other requirements        The patient was identified by name and date of birth  Edy Reza was informed that this is a telemedicine visit and that the visit is being conducted through [facility] and patient was informed that this is not a secure, HIPAA-compliant platform  She agrees to proceed  Pt ended up having to switch to regular telephone due to data limitations  My office door was closed  No one else was in the room  She acknowledged consent and understanding of privacy and security of the video platform  The patient has agreed to participate and understands they can discontinue the visit at any time  Patient is aware this is a billable service  Subjective  Edy Reza is a 45 y o  female presenting to this writer with ongoing anxiety and depression  Pt reports she had the court meeting two weeks ago and father was awarded visitation on Wednesday and Sunday  Pt and her children are not too happy about the outcome  Pt reports her children refused to go with their father on the first supervision day last Sunday  Pt states she is also on edge today as their father is expected to come and pick them up   Pt states her children feel they have not been given a voice in all that has happened and they are not trustful of their father at present  Pt is encouraged to maintain her self care, focus on the variables she is in control of  Pt is encouraged to continue with her healthy eating, exercise and outdoor time  Pt states she loves to work in her garden and yard and plans to do this in the afternoon  Pt states she is utilizing her grounding techniques to try and calm herself down when she gets an anxiety attack  Pt reports she plans to encourage her children to go to therapy to try and give them an opportunity to talk through there problems and concerns  Pt states she is worried they are being damaged and dis-empowered in this process, and she hates to see this happen  Pt also states she has some concerns about her safety and feels her  is still angry and unpredictable  Pt states she is has been in consultation with her  and is aware any disturbance regarding her  should be reported to the police instantly  Pt states her  is the main stressor she is dealing with at the moment, and just thinking about him causes elevated heart rate and constricted feelings  Pt reports she has managed to get her SSD application completed  Pt encouraged to accept she is making progress in her life  Pt states she struggles to identify this at times  Pt is encouraged to focus on the things which are working out  Pt states she is thankful upon reflection  Pt states she is closer to her children than she ever has been, and she is grateful for the support she is getting from [facility] and therapist and PCP during this time  Pt states she is starting to become more hopeful about the future  Pt reports she has stopped taking the Xanax PRN as she felt it was making her sluggish  Pt reports she is going to focus on healthy coping strategies at this time   Once again, this writer reviewed these and provided general psycho-education about managing depression and anxiety  This writer used brief therapy during the session  Pt will follow up with her children in two weeks time         HPI     Past Medical History:   Diagnosis Date    Abnormal uterine bleeding (AUB) 1/15/2020    Added automatically from request for surgery [id number]    Clotting disorder ([facility] [address] )     Depression     Fibroid     GERD (gastroesophageal reflux disease)     Palpitations     S/P laparoscopy 5/11/2020    Status post myomectomy 5/11/2020    Status post vaginal hysterectomy 5/11/2020    Submucous leiomyoma of uterus 5/11/2020       Past Surgical History:   Procedure Laterality Date    EGD      [place] CYSTOURETHROSCOPY N/A 5/11/2020    Procedure: CYSTOSCOPY;  Surgeon: Lavelle Farrell MD;  [facility];  Service: Gynecology    [place] EXCIS UTERINE FIBROID, [address] N/A 5/11/2020    Procedure: MYOMECTOMY WITH HYSTEROSCOPY;  Surgeon: Lavelle Farrell MD;  [facility];  Service: Gynecology    [place] LAP,DIAGNOSTIC ABDOMEN N/A 5/11/2020    Procedure: LAPAROSCOPY DIAGNOSTIC;  Surgeon: Lavelle Farrell MD;  [facility];  Service: Gynecology    [place] VAG HYST,RMV TUBE/OVARY N/A 5/11/2020    Procedure: TOTAL VAGINAL HYSTERECTOMY WITH BILATERAL SALPINGECTOMY;  Surgeon: Lavelle Farrell MD;  [facility];  Service: Gynecology    WISDOM TOOTH EXTRACTION         Current Outpatient Medications   Medication Sig Dispense Refill    ALPRAZolam (XANAX) 0 5 mg tablet Take 1 tablet (0 5 mg total) by mouth 2 (two) times a day as needed for anxiety 60 tablet 0    cetirizine (ZyrTEC) 10 mg tablet TAKE 1 TABLET BY MOUTH DAILY 90 tablet 0    famotidine (PEPCID) 40 MG tablet Take 1 tablet (40 mg total) by mouth daily in the early morning 90 tablet 0    NIFEdipine (PROCARDIA XL) 60 mg 24 hr tablet Take 1 tablet (60 mg total) by mouth daily 30 tablet 4    PARoxetine (PAXIL) 10 mg tablet Take 1 tablet (10 mg total) by mouth daily 30 tablet 3    temazepam (RESTORIL) 30 mg capsule TAKE 2 CAPSULES BY MOUTH AT BEDTIME 60 capsule 2     No current facility-administered medications for this visit  Allergies   Allergen Reactions    Eggs Or Egg-Derived Products - Food Allergy Abdominal Pain    Zolpidem Confusion       Review of Systems    Video Exam    There were no vitals filed for this visit  Physical Exam     I spent 24 minutes directly with the patient during this visit from 1100 to 11:24  Appearance: casually dressed good eye contact; speech: normal pitch and normal volume; behavior: normal, thought process: logical and goal directed- some paranoia regarding ex , thought content: denies SI/HI, perceptions: no delusions of AH/VH, mood: slightly anxious, affect: congruent, orientated x4, insight/judgement: good  VIRTUAL VISIT Dana 0010 acknowledges that she has consented to an online visit or consultation  She understands that the online visit is based solely on information provided by her, and that, in the absence of a face-to-face physical evaluation by the physician, the diagnosis she receives is both limited and provisional in terms of accuracy and completeness  This is not intended to replace a full medical face-to-face evaluation by the physician  Amanda Capone understands and accepts these terms

## 2021-04-09 DIAGNOSIS — F41.0 PANIC ATTACK: ICD-10-CM

## 2021-04-09 DIAGNOSIS — F51.01 PRIMARY INSOMNIA: ICD-10-CM

## 2021-04-09 RX ORDER — TEMAZEPAM 30 MG/1
CAPSULE ORAL
Qty: 60 CAPSULE | Refills: 0 | Status: SHIPPED | OUTPATIENT
Start: 2021-04-09 | End: 2021-05-24 | Stop reason: SDUPTHER

## 2021-04-09 RX ORDER — ALPRAZOLAM 0.5 MG/1
0.5 TABLET ORAL 2 TIMES DAILY PRN
Qty: 60 TABLET | Refills: 0 | Status: SHIPPED | OUTPATIENT
Start: 2021-04-09 | End: 2021-05-24 | Stop reason: SDUPTHER

## 2021-04-09 NOTE — TELEPHONE ENCOUNTER
Per PDMP last fill 03/02/ 03/11/21  Last OV 09/23/20, Next OV 04/14/21 03/11/2021 1 03/11/2021   ALPRAZOLAM 0 5 MG TABLET  60 0 30 RU [facility]   [id number]  [facility] ([id number]) 0  [place] Ins [place]    03/02/2021 1 03/02/2021   TEMAZEPAM 30 MG CAPSULE  60 0 30 RU [facility]   [id number]  [facility] ([id number]) 0  [place]    02/08/2021 1 02/08/2021   ALPRAZOLAM 0 5 MG TABLET  60 0 30 RU [facility]   [id number]  [facility] ([id number]) 0  [place]    01/14/2021 1 01/14/2021   TEMAZEPAM 30 MG CAPSULE  60 0 30 RU [facility]   [id number]  [facility] ([id number]) 0  [place]

## 2021-04-13 ENCOUNTER — PATIENT OUTREACH (OUTPATIENT)
Dept: CASE MANAGEMENT | Facility: OTHER | Age: 51
End: 2021-04-13

## 2021-04-13 NOTE — PROGRESS NOTES
CHW received text message from patient this day asking if I can help her apply for Child Supoort and or [place]  Satinder Nunn informed me her Fiorella Purchase told her she should get this done sooner rather than later but when she asked the process to take or who to call, she wasn't given information  I texted Satinder Nunn back and informed her I can help her but that I would need to do some research first  I let her know I would get back to her with what I find  Satinder Nunn agreed and thanked me  Will inform OPCM YADIRA Damico

## 2021-04-15 ENCOUNTER — PATIENT OUTREACH (OUTPATIENT)
Dept: CASE MANAGEMENT | Facility: OTHER | Age: 51
End: 2021-04-15

## 2021-04-15 ENCOUNTER — PATIENT OUTREACH (OUTPATIENT)
Dept: FAMILY MEDICINE CLINIC | Facility: CLINIC | Age: 51
End: 2021-04-15

## 2021-04-15 NOTE — PROGRESS NOTES
CHW was researching for resources to assist patient with applying for Child Support and Alimony/Spousal Support  CHW was able to find that by going to [employer] [facility]  state  pedro oro/ Lucy Do can apply right online for Child Support and Spousal Support  CHW will text patient about this and see if she would like to schedule a time to talk to me  Will update OPCM YADIRA Watson

## 2021-04-15 NOTE — PROGRESS NOTES
Update received from [facility] stating patient is requesting information on filing for Child Support and Alimony  Assisted CHW Katelyn Gordon in obtaining this resource and CHW Katelyn Gordon provided link to the [employer] where patient is able to apply for both Child Support and Alimony/Spousal Support  Spoke with patient who confirmed she received this information from [facility] and plans to review the information and process tonight  OPCM SW encouraged patient to outreach OPCM SW or [facility] with any questions and if she is needing assistance through this process  Patient appreciative for our outreaches  OPCM SW continues to follow  Update routed to [employer]

## 2021-04-20 PROBLEM — I73.89 ACROCYANOSIS (HCC): Status: ACTIVE | Noted: 2020-09-24

## 2021-04-20 PROBLEM — I74.9 ARTERIAL THROMBOSIS (HCC): Status: ACTIVE | Noted: 2020-09-24

## 2021-04-21 ENCOUNTER — OFFICE VISIT (OUTPATIENT)
Dept: FAMILY MEDICINE CLINIC | Facility: CLINIC | Age: 51
End: 2021-04-21
Payer: COMMERCIAL

## 2021-04-21 VITALS
WEIGHT: 154.8 LBS | HEART RATE: 93 BPM | TEMPERATURE: 97.2 F | DIASTOLIC BLOOD PRESSURE: 74 MMHG | BODY MASS INDEX: 24.3 KG/M2 | OXYGEN SATURATION: 98 % | SYSTOLIC BLOOD PRESSURE: 128 MMHG | HEIGHT: 67 IN

## 2021-04-21 DIAGNOSIS — I10 ESSENTIAL (PRIMARY) HYPERTENSION: ICD-10-CM

## 2021-04-21 DIAGNOSIS — D52.9 ANEMIA DUE TO FOLIC ACID DEFICIENCY, UNSPECIFIED DEFICIENCY TYPE: ICD-10-CM

## 2021-04-21 DIAGNOSIS — D75.839 THROMBOCYTHEMIA: ICD-10-CM

## 2021-04-21 DIAGNOSIS — F33.1 MODERATE EPISODE OF RECURRENT MAJOR DEPRESSIVE DISORDER (HCC): ICD-10-CM

## 2021-04-21 DIAGNOSIS — Z00.00 ANNUAL PHYSICAL EXAM: Primary | ICD-10-CM

## 2021-04-21 DIAGNOSIS — I74.9 ARTERIAL THROMBOSIS (HCC): ICD-10-CM

## 2021-04-21 DIAGNOSIS — I73.89 ACROCYANOSIS (HCC): ICD-10-CM

## 2021-04-21 PROCEDURE — 99396 PREV VISIT EST AGE 40-64: CPT | Performed by: NURSE PRACTITIONER

## 2021-04-21 PROCEDURE — 3008F BODY MASS INDEX DOCD: CPT | Performed by: NURSE PRACTITIONER

## 2021-04-21 PROCEDURE — 3725F SCREEN DEPRESSION PERFORMED: CPT | Performed by: NURSE PRACTITIONER

## 2021-04-21 PROCEDURE — 4004F PT TOBACCO SCREEN RCVD TLK: CPT | Performed by: NURSE PRACTITIONER

## 2021-04-21 RX ORDER — ASPIRIN 325 MG
325 TABLET ORAL DAILY
COMMUNITY

## 2021-04-21 NOTE — PATIENT INSTRUCTIONS

## 2021-04-21 NOTE — PROGRESS NOTES
[address]    NAME: Irlanda Duncan  AGE: 45 y o  SEX: female  : 1970     DATE: 2021     Assessment and Plan:     Problem List Items Addressed This Visit        Cardiovascular and Mediastinum    Essential (primary) hypertension    Relevant Orders    Comprehensive metabolic panel    Lipid panel    RESOLVED: Arterial thrombosis (HCC)    Relevant Orders    VAS lower limb arterial duplex, complete bilateral       Hematopoietic and Hemostatic    Thrombocythemia (HCC)    Relevant Orders    CBC and differential       Other    Moderate episode of recurrent major depressive disorder (HCC)    Acrocyanosis (HCC)    Relevant Orders    VAS lower limb venous duplex study, complete bilateral      Other Visit Diagnoses     Annual physical exam    -  Primary    Anemia due to folic acid deficiency, unspecified deficiency type        Relevant Orders    Iron Panel (Includes Ferritin, Iron Sat%, Iron, and TIBC)    Vitamin B12    Folate        Immunizations and preventive care screenings were discussed with patient today  Appropriate education was printed on patient's after visit summary  Counseling:  Alcohol/drug use: discussed moderation in alcohol intake, the recommendations for healthy alcohol use, and avoidance of illicit drug use  Dental Health: discussed importance of regular tooth brushing, flossing, and dental visits  Injury prevention: discussed safety/seat belts, safety helmets, smoke detectors, carbon dioxide detectors, and smoking near bedding or upholstery  Sexual health: discussed sexually transmitted diseases, partner selection, use of condoms, avoidance of unintended pregnancy, and contraceptive alternatives  · Exercise: the importance of regular exercise/physical activity was discussed  Recommend exercise 3-5 times per week for at least 30 minutes         Depression Screening and Follow-up Plan: Patient's depression screening was positive with a PHQ-2 score of 4  Their PHQ-9 score was 16  Patient assessed for underlying major depression  Brief counseling provided and recommend additional follow-up/re-evaluation next office visit  Continue regular follow-up with their mental health provider who is managing their mental health condition(s)  Depression likely due to other medical condition  Will treat underlying condition  Tobacco Cessation Counseling: Tobacco cessation counseling was provided  The patient is sincerely urged to quit consumption of tobacco  She is not ready to quit tobacco  Medication options and side effects of medication discussed  Patient refused medication  Return in about 4 months (around 8/21/2021)  Chief Complaint:     Chief Complaint   Patient presents with    Physical Exam      History of Present Illness:     Adult Annual Physical   Patient here for a comprehensive physical exam  The patient reports that she continues to have lower extremity pain and color changes based on temperature changes  Patient is currently in the middle of divorce proceedings and stress level is elevated  She is currently living in a home owned by her soon to be ex- and having trust issues  She is currently seeing a counselor to discuss her major depressive issues  Patient as been prescribed SSRI medication (Paxil) to treat depression in past but unable to tolerate  Patient currently using alprazolam to manage anxiety as needed and temazepam for sleep as needed  Patient counseled on not using both medication at the same time  Discussed with patient need to follow-up for vascular issues now that she as her own health insurance  Patient reports that she as car issues so unable to go many places at present time  Diet and Physical Activity  · Diet/Nutrition: well balanced diet, limited junk food, consuming 3-5 servings of fruits/vegetables daily and adequate fiber intake  · Exercise: no formal exercise  Depression Screening  PHQ-9 Depression Screening    PHQ-9:   Frequency of the following problems over the past two weeks:      Little interest or pleasure in doing things: 2 - more than half the days  Feeling down, depressed, or hopeless: 2 - more than half the days  Trouble falling or staying asleep, or sleeping too much: 2 - more than half the days  Feeling tired or having little energy: 2 - more than half the days  Poor appetite or overeatin - more than half the days  Feeling bad about yourself - or that you are a failure or have let yourself or your family down: 2 - more than half the days  Trouble concentrating on things, such as reading the newspaper or watching television: 2 - more than half the days  Moving or speaking so slowly that other people could have noticed  Or the opposite - being so fidgety or restless that you have been moving around a lot more than usual: 2 - more than half the days  Thoughts that you would be better off dead, or of hurting yourself in some way: 0 - not at all  PHQ-2 Score: 4  PHQ-9 Score: 16       General Health  · Sleep: sleeps poorly and gets 4-6 hours of sleep on average  · Hearing: normal - bilateral   · Vision: no vision problems  · Dental: no dental visits for >1 year, brushes teeth once daily and flosses teeth occasionally  /GYN Health  · Patient is: postmenopausal     Review of Systems:     Review of Systems   Constitutional: Negative for activity change and appetite change  HENT: Negative  Eyes: Negative for photophobia, pain, redness, itching and visual disturbance  Respiratory: Negative for cough, chest tightness, shortness of breath and wheezing  Cardiovascular: Negative for chest pain, palpitations and leg swelling  Gastrointestinal: Negative for abdominal distention, abdominal pain, constipation, diarrhea and nausea  Endocrine: Negative for polydipsia, polyphagia and polyuria  Genitourinary: Negative      Musculoskeletal: Positive for myalgias  Skin: Positive for color change  Negative for rash and wound  Allergic/Immunologic: Negative  Neurological: Positive for weakness and numbness  Negative for dizziness, light-headedness and headaches  Hematological: Negative  Psychiatric/Behavioral: Positive for dysphoric mood and sleep disturbance  Negative for decreased concentration, self-injury and suicidal ideas  The patient is nervous/anxious  The patient is not hyperactive         Past Medical History:     Past Medical History:   Diagnosis Date    Abnormal uterine bleeding (AUB) 1/15/2020    Added automatically from request for surgery [id number]    Arterial thrombosis ([facility] [address] ) 9/24/2020    Clotting disorder (HCC)     Depression     Fibroid     GERD (gastroesophageal reflux disease)     Palpitations     S/P laparoscopy 5/11/2020    Status post myomectomy 5/11/2020    Status post vaginal hysterectomy 5/11/2020    Submucous leiomyoma of uterus 5/11/2020      Past Surgical History:     Past Surgical History:   Procedure Laterality Date    EGD      CT CYSTOURETHROSCOPY N/A 5/11/2020    Procedure: CYSTOSCOPY;  Surgeon: Marylene Silos, MD;  [facility];  Service: Gynecology    CT EXCIS UTERINE FIBROID, [address] N/A 5/11/2020    Procedure: MYOMECTOMY WITH HYSTEROSCOPY;  Surgeon: Marylene Silos, MD;  [facility];  Service: Gynecology    CT LAP,DIAGNOSTIC ABDOMEN N/A 5/11/2020    Procedure: LAPAROSCOPY DIAGNOSTIC;  Surgeon: Marylene Silos, MD;  [facility];  Service: Gynecology    CT VAG HYST,RMV TUBE/OVARY N/A 5/11/2020    Procedure: TOTAL VAGINAL HYSTERECTOMY WITH BILATERAL SALPINGECTOMY;  Surgeon: Marylene Silos, MD;  [facility];  Service: Gynecology    WISDOM TOOTH EXTRACTION        Social History:     E-Cigarette/Vaping    E-Cigarette Use Never User      E-Cigarette/Vaping Substances    Nicotine No     THC No     CBD No     Flavoring No     Other No     Unknown No      Social History Socioeconomic History    Marital status: /Civil Union     Spouse name: None    Number of children: None    Years of education: None    Highest education level: None   Occupational History    None   Social Needs    Financial resource strain: None    Food insecurity     Worry: None     Inability: None    Transportation needs     Medical: None     Non-medical: None   Tobacco Use    Smoking status: Current Every Day Smoker     Packs/day: 0 50     Years: 30 00     Pack years: 15 00     Types: Cigarettes    Smokeless tobacco: Never Used   Substance and Sexual Activity    Alcohol use: Not Currently    Drug use: Never    Sexual activity: Not Currently     Partners: Male     Birth control/protection: None   Lifestyle    Physical activity     Days per week: None     Minutes per session: None    Stress: None   Relationships    Social connections     Talks on phone: None     Gets together: None     Attends [employer] service: None     Active member of club or organization: None     Attends meetings of clubs or organizations: None     Relationship status: None    Intimate partner violence     Fear of current or ex partner: None     Emotionally abused: None     Physically abused: None     Forced sexual activity: None   Other Topics Concern    None   Social History Narrative    Caffeine use    Daily coffee consumption      Family History:     Family History   Problem Relation Age of Onset    Hypertension Mother     Coronary artery disease Father     Heart failure Father     Heart attack Father       Current Medications:     Current Outpatient Medications   Medication Sig Dispense Refill    ALPRAZolam (XANAX) 0 5 mg tablet Take 1 tablet (0 5 mg total) by mouth 2 (two) times a day as needed for anxiety 60 tablet 0    aspirin 325 mg tablet Take 325 mg by mouth daily      cetirizine (ZyrTEC) 10 mg tablet TAKE 1 TABLET BY MOUTH DAILY 90 tablet 0    famotidine (PEPCID) 40 MG tablet Take 1 tablet (40 mg total) by mouth daily in the early morning 90 tablet 0    NIFEdipine (PROCARDIA XL) 60 mg 24 hr tablet Take 1 tablet (60 mg total) by mouth daily 30 tablet 4    temazepam (RESTORIL) 30 mg capsule TAKE 2 CAPSULES BY MOUTH AT BEDTIME 60 capsule 0     No current facility-administered medications for this visit  Allergies: Allergies   Allergen Reactions    Eggs Or Egg-Derived Products - Food Allergy Abdominal Pain    Zolpidem Confusion      Physical Exam:     /74   Pulse 93   Temp (!) 97 2 °F (36 2 °C)   Ht 5' 7" (1 702 m)   Wt 70 2 kg (154 lb 12 8 oz)   LMP 04/26/2020   SpO2 98%   BMI 24 25 kg/m² (Reviewed)    Physical Exam  Vitals signs reviewed  Constitutional:       General: She is in acute distress  Appearance: Normal appearance  She is well-developed and well-groomed  She is not ill-appearing  HENT:      Head: Normocephalic and atraumatic  Right Ear: Tympanic membrane, ear canal and external ear normal       Left Ear: Tympanic membrane, ear canal and external ear normal       Nose: Nose normal       Comments: Patient had a facial covering in place as per office protocol     Mouth/Throat:      Lips: Pink  Mouth: Mucous membranes are moist       Pharynx: Oropharynx is clear  Eyes:      General: Lids are normal       Extraocular Movements: Extraocular movements intact  Conjunctiva/sclera: Conjunctivae normal       Pupils: Pupils are equal, round, and reactive to light  Neck:      Musculoskeletal: Neck supple  Thyroid: No thyromegaly or thyroid tenderness  Trachea: Trachea normal    Cardiovascular:      Rate and Rhythm: Normal rate and regular rhythm  Pulses:           Radial pulses are 2+ on the right side and 2+ on the left side  Dorsalis pedis pulses are 1+ on the right side and 1+ on the left side  Posterior tibial pulses are 2+ on the right side and 2+ on the left side  Heart sounds: Normal heart sounds  No murmur   No friction rub  No gallop  Pulmonary:      Effort: Pulmonary effort is normal       Breath sounds: Normal breath sounds  Abdominal:      General: Bowel sounds are normal  There is no distension  Palpations: Abdomen is soft  There is no mass  Tenderness: There is no abdominal tenderness  Musculoskeletal: Normal range of motion  Right lower leg: No edema  Left lower leg: No edema  Right foot: Normal range of motion  Left foot: Normal range of motion  Feet:    Feet:      Right foot:      Protective Sensation: 4 sites tested  3 sites sensed  Skin integrity: Skin integrity normal       Left foot:      Protective Sensation: 4 sites tested  3 sites sensed  Skin integrity: Skin integrity normal    Lymphadenopathy:      Cervical: No cervical adenopathy  Skin:     General: Skin is warm and dry  Capillary Refill: Capillary refill takes less than 2 seconds  Neurological:      General: No focal deficit present  Mental Status: She is alert and oriented to person, place, and time  Cranial Nerves: Cranial nerves are intact  Sensory: Sensory deficit present  Motor: Motor function is intact  Psychiatric:         Mood and Affect: Mood is anxious and depressed  Affect is tearful  Speech: Speech normal          Behavior: Behavior normal  Behavior is cooperative  Thought Content:  Thought content normal           Melany Bradley, [address]

## 2021-04-22 PROBLEM — I74.9 ARTERIAL THROMBOSIS (HCC): Status: RESOLVED | Noted: 2020-09-24 | Resolved: 2021-04-22

## 2021-04-28 ENCOUNTER — SOCIAL WORK (OUTPATIENT)
Dept: BEHAVIORAL/MENTAL HEALTH CLINIC | Facility: CLINIC | Age: 51
End: 2021-04-28
Payer: COMMERCIAL

## 2021-04-28 DIAGNOSIS — F33.1 MODERATE EPISODE OF RECURRENT MAJOR DEPRESSIVE DISORDER (HCC): Primary | ICD-10-CM

## 2021-04-28 PROCEDURE — 90834 PSYTX W PT 45 MINUTES: CPT | Performed by: SOCIAL WORKER

## 2021-04-28 NOTE — PSYCH
Assessment/Plan:      Diagnoses and all orders for this visit:    Moderate episode of recurrent major depressive disorder ([address] )          Subjective:     Patient ID: Tanner Torres is a 45 y o  female presenting with her two children Jose Alfredo Hernadez and Leydi Zaid for family psychotherapy session  Pt wanted children to have a chance to talk about some of their struggles over recent months  Swethaharmeet Hernadez did most of the talking during the session  Leydi Mar was much more withdrawn and did not make eye contact with this writer during the session  Paxton Rosales both expressed their individual hostility to their father who has put the family through great stress  Jose Alfredo Hernadez in particular talked about walking on egg shells constantly around the house  Leydi Zaid nodded often as Jose Alfredo Hernadez was talking  Both children report refusing to go with their father on his weekend to take care of them  Both children talked about their anger at the court system not taking their point of view when it came to custody issues  Swethaharmeet Satya spent time talking about his experieces in school and the bullying he endured from classmates  Jose Alfredo Hernadez talked about how he was labelled as a potential school shooter by people in his class and this is something which has never gone away  Jose Alfredo Hernadez mentioned that online classes have been very good for him as he has managed to get away from the bullying  Leydikarlie Mar did not open up quite as much as her brother  She nodded her head when Swethaharmeet Satya was talking about the bullying  It appears she has also faces ostracism in her school  Leydi Mar also appears to have benefited some to being away from school setting  Neither of the children have friends  They both spend a lot of time playing video games and talking to people on line  Both children reports their situation is much improved since father left  Both children state they are more optimistic about the future       This writer talked with mother and children about how past traumatic events can cause us to fear the future and lead us to expecting harm in the future  This writer encouraged the children to see the future in a more hopeful way  This writer encouraged both children that as they mature, they can choose the kinds of subjects and careers they do and this may mesh better with their personality types  Both children felt encouraged by this  Both children are encouraged to continue talking with therapist, and with mother about their struggles and maintain open lines of communication where possible  Children and mother encouraged to follow up in two weeks  HPI    Review of Systems      Objective:     Physical Exam  This writer spent 45 minutes with pt from  Appearance:everyone was casually dressed good eye contact apart from Oscar who was quiet and withdrawn; speech: normal pitch and normal volume; behavior: normal, thought process: logical and goal directed, thought content: denies SI/HI, perceptions: no delusions of AH/VH, mood: slightly anxious, affect: congruent, orientated x4, insight/judgement: good

## 2021-04-29 ENCOUNTER — PATIENT OUTREACH (OUTPATIENT)
Dept: CASE MANAGEMENT | Facility: OTHER | Age: 51
End: 2021-04-29

## 2021-04-29 ENCOUNTER — PATIENT OUTREACH (OUTPATIENT)
Dept: FAMILY MEDICINE CLINIC | Facility: CLINIC | Age: 51
End: 2021-04-29

## 2021-04-29 NOTE — PROGRESS NOTES
Spoke with patient who stated she is having ups and down  OPCM SW provided supportive listening to patient  Patient plans to continue therapy sessions with Stephon Howard through PCP office and also stated her children saw him yesterday for their first session  Patient plans to call PCP office tomorrow to schedule next therapy sessions and to also schedule appt with Remington Zamudio 3 assisted patient in completing Child Support/Alimony applications  Patient already received update from the [place] [employer] who informed there is an additional application that needs to be completed; patient stated she should be able to complete this application on her own but then stated it requires information for the birth certificates and SSNs of her children, which she does not have as her  took these documents  Curtis Zamudio 3 provided patient with applications on how to request a new card with their SSNs and birth certificates  Patient has been hesitant in sending this via mail as she has been having security issues with mail  Will discuss alternate mailing options with CHW Yenifer Rios

## 2021-04-29 NOTE — PROGRESS NOTES
CHW reached out to patient this day to assist with Child Support and Spousal Support application online  CHW was able to get both of these completed this day  Satinder Nunn and I agreed that she will text me when and if she hears back from [address] ([place]) regarding application  CHW will update [facility] MSW Qing Damico

## 2021-04-29 NOTE — PROGRESS NOTES
OPCM SW outreached patient to check status  Patient had therapy session with [facility] yesterday, per chart review  Patient stated she is doing okay and also stated Shivani Pierre will be calling at 10AM to assist with Child Support/Alimony Application  OPCM SW ended call in order for patient to be available for CHW Beba's call and encouraged patient to outreach with any needs in the meantime  OPCSAMSON MAY continues to follow

## 2021-05-11 ENCOUNTER — PATIENT OUTREACH (OUTPATIENT)
Dept: FAMILY MEDICINE CLINIC | Facility: CLINIC | Age: 51
End: 2021-05-11

## 2021-05-11 NOTE — PROGRESS NOTES
OPCM SW outreached patient to check status and follow-up on child support, social security cards, and birth certificates for patient's children (patient is needing to apply for new SS cards and birth certificates for her children as the father of patient's children has these documents)  Patient stated she is doing fine and is planning to complete/submit needed Child Support Paperwork this evening  Patient is aware alternate address can be placed on SS Card applications, per Randolph Frederick; patient considering having SS cards/birth certificate sent to alternate address as she is concerned about the security of her mail being received at her home address  OPCSAMSON MAY also discussed options to begin birth certificate application process (can be done online, in-person, or via mail)  Patient requested this information be emailed to her to review application options  Patient will outreach OPCM SW or CHW with any questions  Patient has not yet scheduled next therapy session with Dimple Cunha as her car is being fixed; will schedule at later date  No other needs at this time  OPCSAMSON MAY continues to follow

## 2021-05-17 ENCOUNTER — PATIENT OUTREACH (OUTPATIENT)
Dept: FAMILY MEDICINE CLINIC | Facility: CLINIC | Age: 51
End: 2021-05-17

## 2021-05-17 NOTE — PROGRESS NOTES
Email received from patient stating she is afraid she passed the deadline to submit her request for support/alimony  Patient also stated she has paperwork that needs to be printed but her printer is no longer working in order to obtain these needed documents  Call placed to [place] [address] [phone] to inquire if patient is still able to continue with original application  Representative stated patient should be able to go online to the website and continue working on application  Once submitted, [place] [employer] would set up a conference date with patient  Patient is then able to submit any additional information needed  Representative denied a deadline that patient would need to meet  Patient can call helpline [phone] to talk with next steps if needed  Call placed to patient to inform of the above information  Patient understanding and will look back at application in order to submit additional needed information/documents  ROBERT MAY provided support to patient as she stated she has a lot going on right now with the coparent therapist for her children, the [facility] psychiatrist appt on Wednesday 5/19, issues with her , the child support/alimony application, and the upkeep of her home/property  Patient is needing to send in forms to [employer] through [employer] prior to psychiatry appt on Wednesday; patient has these forms uploaded electronically and will call [employer] to inquire if they can be sent via email  ROBERT MAY offered to mail to [employer] on patient's behalf but informed patient [employer] would not receive these by Wednesday's appt  Patient to call [employer] and will inform ROBERT MAY if mailing needs to be sent  Patient is considering representing herself as she is unhappy with the support she has been receiving from her  and the cost of his services    ROBERT MAY discussed support that  could provide but informed patient it would be her decision; also mentioned [facility]  Legal Services  Patient to think on this and make a decision  OPCM SW remains available to assist as needed and continues to follow  OPCM SW appreciative for the outreach   ___________    Patient requesting OPCM SW mail [employer] form as she has been unable to reach this office to discuss sending form via email  Mailing sent via patient's request to address on first page of [employer] Form  Patient aware

## 2021-05-24 DIAGNOSIS — F41.0 PANIC ATTACK: ICD-10-CM

## 2021-05-24 DIAGNOSIS — F51.01 PRIMARY INSOMNIA: ICD-10-CM

## 2021-05-25 RX ORDER — TEMAZEPAM 30 MG/1
CAPSULE ORAL
Qty: 60 CAPSULE | Refills: 0 | Status: SHIPPED | OUTPATIENT
Start: 2021-05-25 | End: 2021-07-07 | Stop reason: SDUPTHER

## 2021-05-25 RX ORDER — ALPRAZOLAM 0.5 MG/1
0.5 TABLET ORAL 2 TIMES DAILY PRN
Qty: 60 TABLET | Refills: 0 | Status: SHIPPED | OUTPATIENT
Start: 2021-05-25 | End: 2021-07-07 | Stop reason: SDUPTHER

## 2021-05-26 ENCOUNTER — PATIENT OUTREACH (OUTPATIENT)
Dept: CASE MANAGEMENT | Facility: OTHER | Age: 51
End: 2021-05-26

## 2021-05-27 ENCOUNTER — PATIENT OUTREACH (OUTPATIENT)
Dept: CASE MANAGEMENT | Facility: OTHER | Age: 51
End: 2021-05-27

## 2021-05-27 NOTE — PROGRESS NOTES
CHW called patient to go over SSD Denial and talk about other options for her application  Lisa Hunter informed me she was told she needed to apply for a different type of Social Security but wasn't told which one specifically  I told Lisa Hunter I think we should call [employer] together and find out what happened to her SSI application because I helped her to complete both but she hasn't heard anything regarding that  Lisa Hunter agreed  We attempted to reach [employer] this afternoon but the [facility] was closed  Lisa Hunter agreed we could call tomorrow 05/27 at 10:30AM  CHW agreed  Will inform [facility] MSW Shannon Jordan

## 2021-05-27 NOTE — PROGRESS NOTES
CHW reached out to [facility] this day with Daiva Goldmann and spoke with Laz Meredith and he informed us that Daiva Goldmann applied for SSI and SSD  At this time SSD was denied medically and SSI was approved Laz Meredith transferred us to Specialist Mrs Brian Chavez to schedule decision approval for SSI  Ailyn Alvarado agreed to talk on 06/02/21 at 1:15PM to go over this  Ended call with SSA  Daiva Goldmann said she is very relieved that she was at least approved for the SSI and thanked me for helping  CHW also asked about Child Support and Spousal Support application and Daiva Goldmann informed me that her son has been affected by this and she needs to take him to his PCP, but that they sent her information she needs to complete and we agreed she could send me the information when completed by email this way I can forward to Domestic Relations since she is worried about sending things in the mail  CHW agreed, will wait for this to come through  Will update OPCM YADIRA Ruiz

## 2021-05-31 DIAGNOSIS — L50.9 URTICARIA: ICD-10-CM

## 2021-06-01 RX ORDER — CETIRIZINE HYDROCHLORIDE 10 MG/1
TABLET ORAL
Qty: 90 TABLET | Refills: 0 | Status: SHIPPED | OUTPATIENT
Start: 2021-06-01 | End: 2021-08-30

## 2021-06-02 ENCOUNTER — PATIENT OUTREACH (OUTPATIENT)
Dept: FAMILY MEDICINE CLINIC | Facility: CLINIC | Age: 51
End: 2021-06-02

## 2021-06-02 DIAGNOSIS — K21.9 GASTROESOPHAGEAL REFLUX DISEASE: ICD-10-CM

## 2021-06-02 DIAGNOSIS — I75.022 BLUE TOE SYNDROME OF LEFT LOWER EXTREMITY (HCC): ICD-10-CM

## 2021-06-02 RX ORDER — FAMOTIDINE 40 MG/1
40 TABLET, FILM COATED ORAL
Qty: 90 TABLET | Refills: 0 | Status: SHIPPED | OUTPATIENT
Start: 2021-06-02 | End: 2021-11-29 | Stop reason: SDUPTHER

## 2021-06-02 RX ORDER — NIFEDIPINE 60 MG/1
60 TABLET, EXTENDED RELEASE ORAL DAILY
Qty: 90 TABLET | Refills: 0 | Status: SHIPPED | OUTPATIENT
Start: 2021-06-02 | End: 2021-09-07

## 2021-06-02 NOTE — PROGRESS NOTES
ROBERT MAY outreached patient to check status  ROBERT MAY and Cayla Mathew were recently informed that patient was denied for SSD but approved for SSI benefits  Patient has no other form of income at this time  Per chart review, patient is anticipating call from Jaison Blount today to discuss benefits  Spoke with patient who was emotional as she just ended call with her  (in process of divorce) who is requesting fishing poles from the house before he will send her check; patient's  was asked during coparent counseling class this weekend to send overdue funds to patient and their children and to stay away from the house  Patient stated he is now saying he will not give check until he gets his fishing poles  ROBERT MAY advised patient to call coparent counselor and  to inform of today's phone call encounter with her  and to relay his request before check will be sent  Patient is overwhelmed; ROBERT MAY provided support  Patient stated she will outreach both counselor and   Patient did confirm she received call from [employer] but was not told exact amount she will be receiving; instead, patient was told that paperwork would need to be sent to her  to complete and return as they still have a joint checking account  Patient is worried her  will take his time in completing this paperwork, which will delay her SSI benefits  Patient is worried as she has no funds left at this time to pay for household expenses until check from her  is received  ROBERT MAY inquired about the age of her children (17 and 12years old)  Patient would like be eligible for [facility]  ROBERT MAY outreached [facility] [phone] and spoke with representative (conference call completed with patient on the call as well)  Patient's Case Number is #[id number]    Representative will send message to patient's assigned  to request  outreach patient to follow-up/assist with additional resources  Patient agreeable to this and will take down 's name/information once call is received (within 3 business days) but requested CHW Beba call  with her once contact information is known to walk through resources/next steps  Representative stated patient can sign in to [address] to apply for [employer] in the meantime  Will route update to Curtis Zamudio 3 to assist with [employer] Application upon her return to the office  Patient also missed deadline to submit needed documents/information in order to continue with Child Support/Alimony  ROBERT MAY encouraged patient to continue gathering needed information and to outreach Child Support Hotline to inquire if case can be reopened if requested information is provided  ____________________    Patient informed ROBERT MAY that she emailed coparent counselor and placed call to  to inform of issue regarding needed check from her   She is awaiting responses from them  She also asked ROBERT MAY if she can withdrawal her half of their joint bank account and remove her name from the account; ROBERT MAY encouraged patient to discuss this matter with her  in order to ensure she is taking the proper steps  Patient understanding and will discuss this with her   ROBERT MAY continues to follow    , bank account in name,

## 2021-06-07 ENCOUNTER — PATIENT OUTREACH (OUTPATIENT)
Dept: CASE MANAGEMENT | Facility: OTHER | Age: 51
End: 2021-06-07

## 2021-06-08 NOTE — PROGRESS NOTES
CHW received text message from patient letting me know that she received two P-EBT (Pandemic Electronic Benefit Transfer) cards for her children  The school districts are providing this to families that are considered "Low income" with DHS  Ben Citlali explained to me that she tried to call to activate the cards but since she doesn't know what her children's SSN's are-she can't activate the cards  We agreed to call DHS together  I called [facility] on [address] and spoke with Ms Karely Martines and she informed us that [facility] is not administering the P-EBT cards-that because it's the school district, we could need to call a different number  Ms Karely Martines provided us with the number and we called together to see if they can assist, however, it's just a general voicemailbox  Ben Pean stated she would call back to leave her information so that they can call her back  CHW agreed  CHW also offered to help Ben Pean with SSN applications for her children, CHW previously sent this to her by mail, but she states she is just overwhelmed and can't get it done  CHW then offered to visit patient at her home and assist with this  Ben Pean at this time denied due to her home being a mess  I assured Josselyn Hidalgo am okay with that but she insisted not to  She agreed to possibly meeting at PCP office, but she will let me know when she is ready because at this time she doesn't feel safe driving especially since it will be raining this week  CHW agreed, Zachary Terrazas know to just call or text me when she is ready but I strongly encouraged her to get this completed because this will help her get other resources as well  Patient agreed and understood  CHW updated [facility] MSW Madison Quivers about this conversation  Will follow-up with patient next week

## 2021-06-16 ENCOUNTER — PATIENT OUTREACH (OUTPATIENT)
Dept: FAMILY MEDICINE CLINIC | Facility: CLINIC | Age: 51
End: 2021-06-16

## 2021-06-16 ENCOUNTER — PATIENT OUTREACH (OUTPATIENT)
Dept: CASE MANAGEMENT | Facility: OTHER | Age: 51
End: 2021-06-16

## 2021-06-16 NOTE — PROGRESS NOTES
CHW spoke with patient this day after receiving update from [facility] MSNORRIS Bansal that patient has been trying to reach me      ----------------------  CHW spoke with Leeann Mejia and she informed me she is very overwhelmed  She was informed by her Co-Parent counselor that she should try and appeal SSD decisions since SSI alone won't be enough money  I did encourage Leeann Mejia to speak with the Branden Confer that her Coparent counselor recommended  At this time Leeann Mejia wants to wait to schedule a day to complete paperwork because she is waiting to hear back from her daughter's school to know when [employer] starts  Leeann Mejia states she would have to work around this time  CHW agreed  [employer] know I can start prepping the applications and she agreed  No other concerns at this time, will wait to hear back from patient when she has more information  Will update OPCM MSNORRIS Bansal

## 2021-06-16 NOTE — PROGRESS NOTES
Call received from patient stating she spoke with her coparent counselor who advised her to appeal the SSD denial determination  The coparent counselor provided her with information for an  that can assist with this appeal process  ROBERT MAY informed patient of 60 day timeframe to appeal determination; can appeal by completing form or calling SSA (emailed this information to patient per her request to review this information)  Patient plans to outreach this  to discuss next steps; stated he is able to assist pro-fernando  Patient also stated she remains overwhelmed with outstanding applications (Klaus S Harlan Rogers, Child Support, applying for replacement birth certificates and social security cards for her children)  Patient sent message to [facility] and informed ROBERT MAY that she would like to schedule time to meet with [facility] at PCP office to go through these applications  Will route update to [facility]    Patient remains appreciative for assistance she is receiving from team

## 2021-06-28 ENCOUNTER — PATIENT OUTREACH (OUTPATIENT)
Dept: CASE MANAGEMENT | Facility: OTHER | Age: 51
End: 2021-06-28
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
## 2021-07-07 ENCOUNTER — PATIENT OUTREACH (OUTPATIENT)
Dept: FAMILY MEDICINE CLINIC | Facility: CLINIC | Age: 51
End: 2021-07-07

## 2021-07-07 DIAGNOSIS — F51.01 PRIMARY INSOMNIA: ICD-10-CM

## 2021-07-07 DIAGNOSIS — F41.0 PANIC ATTACK: ICD-10-CM

## 2021-07-07 RX ORDER — ALPRAZOLAM 0.5 MG/1
0.5 TABLET ORAL 2 TIMES DAILY PRN
Qty: 60 TABLET | Refills: 0 | Status: SHIPPED | OUTPATIENT
Start: 2021-07-07 | End: 2021-08-11 | Stop reason: SDUPTHER

## 2021-07-07 RX ORDER — TEMAZEPAM 30 MG/1
CAPSULE ORAL
Qty: 60 CAPSULE | Refills: 0 | Status: SHIPPED | OUTPATIENT
Start: 2021-07-07 | End: 2021-08-11 | Stop reason: SDUPTHER

## 2021-07-07 NOTE — TELEPHONE ENCOUNTER
[place] PDMP verified  Patient follows with Honey Arzola for this controlled substance  Last refills:    05/25/2021  1   05/25/2021  Temazepam 30 MG Capsule  60 00  30 [place]   [id number]   Og (6609)   0   [place]   05/25/2021  1   05/25/2021  Alprazolam 0 5 MG Tablet  60 00  30 [place]   [id number]   Og (6609)   0   Comm Ins          I will refill as ordered
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
## 2021-07-11 ENCOUNTER — TELEPHONE (OUTPATIENT)
Dept: FAMILY MEDICINE CLINIC | Facility: CLINIC | Age: 51
End: 2021-07-11

## 2021-07-11 NOTE — TELEPHONE ENCOUNTER
Please call patient  We received notice that her temazepam she takes at bedtime for sleep is not covered by her insurance plan  She can pay cash with a [employer] retargeting coupon and it would cost about $10 for a month supply to stay on that medication, or we can make recommendations for alternatives

## 2021-07-26 ENCOUNTER — PATIENT OUTREACH (OUTPATIENT)
Dept: CASE MANAGEMENT | Facility: OTHER | Age: 51
End: 2021-07-26

## 2021-07-26 NOTE — PROGRESS NOTES
CHW received text message from Johnny Borrero today asking if I can reach out to her today  CHW texted patient back that at 1PM I can call her  Johnny Borrero agreed  ----------------------  Spoke to Johnny Borrero this day  She states her  still hasn't given her the money from the Advanced Child Tax Credit payments  It would have been a total of $500 00 for her children  Her  emailed her that there was obly $250 00 in their joined account (of which she removed herself from back in May)  Johnny Borrero wanted to know if there was any way she could make changes to the payments going forward  I informed Johnny Borrero that I'm not positive but that I can check online  I went on to IRS  gov and was able to locate information regarding these payments titled "Get Details on the Advance Child Tax Credit  There is an option to make changes to account information, however, you have to create a Id me account and go through about 5 steps to verify your identity  CHW attempted to assist patient with this but Addy Thayer wasn't able to complete this task due to techincal issues  I then provided Johnny santos [facility] local IRS office's contact information  Johnny Borrero informed me she would call them to find out and see if they can help  CHW agreed and we ended call      ------------------------  CHW then received text message from Johnny Borrero stating she was told from [facility] IRS office that she will need to take this court  She plans to contact her  to assist  CHW will update [facility] MSW Tiara Sands

## 2021-08-03 ENCOUNTER — PATIENT OUTREACH (OUTPATIENT)
Dept: CASE MANAGEMENT | Facility: OTHER | Age: 51
End: 2021-08-03

## 2021-08-03 NOTE — PROGRESS NOTES
CHW received text message from patient yesterday to call her when I have time  CHW reached out to patient today  Jason Anderson is still struggling to deal with SSA benefits  Last she was informed from [address] they were waiting on documents from her  to turn in  She's been trying to contact SSA to check the statu but keeps getting disconnected and or just can't get a hold of anyone  I provided Jason Anderson with the customer service phone number as well as the local office in [place]  No other concerns at this time  Will update [facility]

## 2021-08-11 ENCOUNTER — PATIENT OUTREACH (OUTPATIENT)
Dept: CASE MANAGEMENT | Facility: OTHER | Age: 51
End: 2021-08-11

## 2021-08-11 DIAGNOSIS — F41.0 PANIC ATTACK: ICD-10-CM

## 2021-08-11 DIAGNOSIS — F51.01 PRIMARY INSOMNIA: ICD-10-CM

## 2021-08-11 RX ORDER — ALPRAZOLAM 0.5 MG/1
0.5 TABLET ORAL 2 TIMES DAILY PRN
Qty: 60 TABLET | Refills: 0 | Status: SHIPPED | OUTPATIENT
Start: 2021-08-11 | End: 2021-09-20 | Stop reason: SDUPTHER

## 2021-08-11 RX ORDER — TEMAZEPAM 30 MG/1
CAPSULE ORAL
Qty: 60 CAPSULE | Refills: 0 | Status: SHIPPED | OUTPATIENT
Start: 2021-08-11 | End: 2021-09-20 | Stop reason: SDUPTHER

## 2021-08-11 NOTE — TELEPHONE ENCOUNTER
07/08/2021  1  07/07/2021  TEMAZEPAM 30 MG CAPSULE  60 0  30  EL [place]  [id number]  WALGR (6609)  0   [place]    07/07/2021  1  07/07/2021  ALPRAZOLAM 0 5 MG TABLET  60 0  30  EL [place]  [id number]  WALGR (6609)  0   Comm Ins

## 2021-08-12 ENCOUNTER — PATIENT OUTREACH (OUTPATIENT)
Dept: FAMILY MEDICINE CLINIC | Facility: CLINIC | Age: 51
End: 2021-08-12
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
## 2021-08-29 DIAGNOSIS — L50.9 URTICARIA: ICD-10-CM

## 2021-08-30 ENCOUNTER — OFFICE VISIT (OUTPATIENT)
Dept: FAMILY MEDICINE CLINIC | Facility: CLINIC | Age: 51
End: 2021-08-30
Payer: COMMERCIAL

## 2021-08-30 VITALS
HEART RATE: 84 BPM | TEMPERATURE: 98.3 F | HEIGHT: 67 IN | BODY MASS INDEX: 23.86 KG/M2 | WEIGHT: 152 LBS | SYSTOLIC BLOOD PRESSURE: 120 MMHG | DIASTOLIC BLOOD PRESSURE: 80 MMHG

## 2021-08-30 DIAGNOSIS — I73.89 ACROCYANOSIS (HCC): ICD-10-CM

## 2021-08-30 DIAGNOSIS — I10 ESSENTIAL (PRIMARY) HYPERTENSION: ICD-10-CM

## 2021-08-30 DIAGNOSIS — F33.1 MODERATE EPISODE OF RECURRENT MAJOR DEPRESSIVE DISORDER (HCC): ICD-10-CM

## 2021-08-30 DIAGNOSIS — F41.9 ANXIETY: Primary | ICD-10-CM

## 2021-08-30 PROCEDURE — 3008F BODY MASS INDEX DOCD: CPT | Performed by: NURSE PRACTITIONER

## 2021-08-30 PROCEDURE — 3074F SYST BP LT 130 MM HG: CPT | Performed by: NURSE PRACTITIONER

## 2021-08-30 PROCEDURE — 3079F DIAST BP 80-89 MM HG: CPT | Performed by: NURSE PRACTITIONER

## 2021-08-30 PROCEDURE — 99214 OFFICE O/P EST MOD 30 MIN: CPT | Performed by: NURSE PRACTITIONER

## 2021-08-30 PROCEDURE — 4004F PT TOBACCO SCREEN RCVD TLK: CPT | Performed by: NURSE PRACTITIONER

## 2021-08-30 RX ORDER — DULOXETIN HYDROCHLORIDE 20 MG/1
20 CAPSULE, DELAYED RELEASE ORAL DAILY
Qty: 30 CAPSULE | Refills: 2 | Status: SHIPPED | OUTPATIENT
Start: 2021-08-30 | End: 2021-11-10 | Stop reason: SINTOL

## 2021-08-30 RX ORDER — CETIRIZINE HYDROCHLORIDE 10 MG/1
TABLET ORAL
Qty: 90 TABLET | Refills: 0 | Status: SHIPPED | OUTPATIENT
Start: 2021-08-30 | End: 2021-11-29
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
## 2021-09-02 ENCOUNTER — PATIENT OUTREACH (OUTPATIENT)
Dept: CASE MANAGEMENT | Facility: OTHER | Age: 51
End: 2021-09-02

## 2021-09-05 DIAGNOSIS — I75.022 BLUE TOE SYNDROME OF LEFT LOWER EXTREMITY (HCC): ICD-10-CM

## 2021-09-07 RX ORDER — NIFEDIPINE 60 MG/1
TABLET, EXTENDED RELEASE ORAL
Qty: 90 TABLET | Refills: 0 | Status: SHIPPED | OUTPATIENT
Start: 2021-09-07 | End: 2021-11-29 | Stop reason: SDUPTHER

## 2021-09-13 ENCOUNTER — PATIENT OUTREACH (OUTPATIENT)
Dept: FAMILY MEDICINE CLINIC | Facility: CLINIC | Age: 51
End: 2021-09-13

## 2021-09-13 NOTE — PROGRESS NOTES
Update received from PCP office confirming patient's Domestic Relations for was mailed on Friday 9/10  Outreached patient to inform of this  Patient aware

## 2021-09-13 NOTE — PROGRESS NOTES
Per chart review, PCP completed/sent needed Domestic Relations paperwork for review (patient informed paperwork would needed for hearing on 9/30)  Outreached patient to check status  Patient had recent PCP appt, has support & alimony hearing on 9/30, was recently informed that her  is leaving the firm, and she plans to call the owner of the law firm to discuss complaints of how she was treated by   Provided support to patient  Patient recently faxed form to [employer] that was being requested  Patient states form form PCP for Domestic Relations would need to be mailed (not faxed)  Inbasket message sent to PCP office clerical staff to confirm that form was mailed  Will continue to follow patient; will plan to outreach after hearing date

## 2021-09-20 DIAGNOSIS — F41.0 PANIC ATTACK: ICD-10-CM

## 2021-09-20 DIAGNOSIS — F51.01 PRIMARY INSOMNIA: ICD-10-CM

## 2021-09-20 RX ORDER — ALPRAZOLAM 0.5 MG/1
0.5 TABLET ORAL 2 TIMES DAILY PRN
Qty: 60 TABLET | Refills: 0 | Status: SHIPPED | OUTPATIENT
Start: 2021-09-20 | End: 2021-10-20 | Stop reason: SDUPTHER

## 2021-09-20 RX ORDER — TEMAZEPAM 30 MG/1
CAPSULE ORAL
Qty: 60 CAPSULE | Refills: 0 | Status: SHIPPED | OUTPATIENT
Start: 2021-09-20 | End: 2021-10-20 | Stop reason: SDUPTHER

## 2021-09-20 NOTE — TELEPHONE ENCOUNTER
[place]  Patient follows with JOSE Alvarez for this controlled substance  Last refills:    08/12/2021  1   08/11/2021  Temazepam 30 MG Capsule  60 00  30 [facility]   [id number]   [facility] ([id number])   0   [place]   08/11/2021  1   08/11/2021  Alprazolam 0 5 MG Tablet  60 00  30 [facility]   [id number]   [facility] ([id number])   0   Comm Ins          I will refill as ordered

## 2021-09-20 NOTE — TELEPHONE ENCOUNTER
08/12/2021 1 08/11/2021   TEMAZEPAM 30 MG CAPSULE  60 0 30 RU CARMEN   [id number]  [facility] ([id number]) 0  [place]    08/11/2021 1 08/11/2021   ALPRAZOLAM 0 5 MG TABLET  60 0 30 RU CARMEN   [id number]  [facility] ([id number]) 0  [place]    07/08/2021 1 07/07/2021   TEMAZEPAM 30 MG CAPSULE  60 0 30 EL ROSETTA   [id number]  [facility] ([id number]) 0  [place]

## 2021-09-21 ENCOUNTER — PATIENT OUTREACH (OUTPATIENT)
Dept: FAMILY MEDICINE CLINIC | Facility: CLINIC | Age: 51
End: 2021-09-21

## 2021-09-21 NOTE — PROGRESS NOTES
Patient brought Physician Verification Form to PCP office as they were returned from [address] [place] [address] Office stating "not acceptable"  Urgent need as forms are needed by 9/30 hearing  Met with patient while at PCP office to further clarify  Call placed to [employer] with patient present to discuss  Rep Reynold Almaraz stated form cannot be completed by JOSE (previously completed by Joy Lacey)  Inquired if MD or DO can cosign  Reynold Almaraz stated form would need to be completed in its entirety by MD or DO (cosign will not be accepted)  Provided to Dr Shanks for assistance in completing  Original form must be provided to court in-person or by mail  Form can also be faxed (fax # on physician verification form) but original form is still needed  Plan is for Dr Shanks to complete, office to fax to court, and call patient to inform once form is completed; patient will  original form from office and bring to hearing on 9/30  Wolf Palafox stated patient can bring original form day-of-hearing; court does not need original form ahead of time  Office staff aware of plan  Patient agreeable as well  Will continue to follow

## 2021-09-29 ENCOUNTER — APPOINTMENT (OUTPATIENT)
Dept: LAB | Age: 51
End: 2021-09-29
Payer: COMMERCIAL

## 2021-09-29 DIAGNOSIS — D52.9 ANEMIA DUE TO FOLIC ACID DEFICIENCY, UNSPECIFIED DEFICIENCY TYPE: ICD-10-CM

## 2021-09-29 LAB
ALBUMIN SERPL BCP-MCNC: 3.6 G/DL (ref 3.5–5)
ALP SERPL-CCNC: 72 U/L (ref 46–116)
ALT SERPL W P-5'-P-CCNC: 17 U/L (ref 12–78)
ANION GAP SERPL CALCULATED.3IONS-SCNC: 3 MMOL/L (ref 4–13)
AST SERPL W P-5'-P-CCNC: 11 U/L (ref 5–45)
BASOPHILS # BLD AUTO: 0.06 THOUSANDS/ΜL (ref 0–0.1)
BASOPHILS NFR BLD AUTO: 1 % (ref 0–1)
BILIRUB SERPL-MCNC: 0.36 MG/DL (ref 0.2–1)
BUN SERPL-MCNC: 9 MG/DL (ref 5–25)
CALCIUM SERPL-MCNC: 8.8 MG/DL (ref 8.3–10.1)
CHLORIDE SERPL-SCNC: 107 MMOL/L (ref 100–108)
CO2 SERPL-SCNC: 27 MMOL/L (ref 21–32)
CREAT SERPL-MCNC: 0.85 MG/DL (ref 0.6–1.3)
CRP SERPL QL: <3 MG/L
DAT POLY-SP REAG RBC QL: NEGATIVE
EOSINOPHIL # BLD AUTO: 0.51 THOUSAND/ΜL (ref 0–0.61)
EOSINOPHIL NFR BLD AUTO: 6 % (ref 0–6)
ERYTHROCYTE [DISTWIDTH] IN BLOOD BY AUTOMATED COUNT: 13.3 % (ref 11.6–15.1)
ERYTHROCYTE [SEDIMENTATION RATE] IN BLOOD: 10 MM/HOUR (ref 0–29)
FERRITIN SERPL-MCNC: 34 NG/ML (ref 8–388)
FOLATE SERPL-MCNC: >20 NG/ML (ref 3.1–17.5)
GFR SERPL CREATININE-BSD FRML MDRD: 80 ML/MIN/1.73SQ M
GLUCOSE SERPL-MCNC: 89 MG/DL (ref 65–140)
HCT VFR BLD AUTO: 45.8 % (ref 34.8–46.1)
HGB BLD-MCNC: 15.3 G/DL (ref 11.5–15.4)
IMM GRANULOCYTES # BLD AUTO: 0.02 THOUSAND/UL (ref 0–0.2)
IMM GRANULOCYTES NFR BLD AUTO: 0 % (ref 0–2)
IRON SATN MFR SERPL: 21 % (ref 15–50)
IRON SERPL-MCNC: 80 UG/DL (ref 50–170)
LYMPHOCYTES # BLD AUTO: 2.87 THOUSANDS/ΜL (ref 0.6–4.47)
LYMPHOCYTES NFR BLD AUTO: 32 % (ref 14–44)
MCH RBC QN AUTO: 31.8 PG (ref 26.8–34.3)
MCHC RBC AUTO-ENTMCNC: 33.4 G/DL (ref 31.4–37.4)
MCV RBC AUTO: 95 FL (ref 82–98)
MONOCYTES # BLD AUTO: 0.76 THOUSAND/ΜL (ref 0.17–1.22)
MONOCYTES NFR BLD AUTO: 9 % (ref 4–12)
NEUTROPHILS # BLD AUTO: 4.66 THOUSANDS/ΜL (ref 1.85–7.62)
NEUTS SEG NFR BLD AUTO: 52 % (ref 43–75)
NRBC BLD AUTO-RTO: 0 /100 WBCS
PLATELET # BLD AUTO: 357 THOUSANDS/UL (ref 149–390)
PMV BLD AUTO: 10.5 FL (ref 8.9–12.7)
POTASSIUM SERPL-SCNC: 3.5 MMOL/L (ref 3.5–5.3)
PROT SERPL-MCNC: 7.6 G/DL (ref 6.4–8.2)
RBC # BLD AUTO: 4.81 MILLION/UL (ref 3.81–5.12)
RETICS # AUTO: NORMAL 10*3/UL (ref 14097–95744)
RETICS # CALC: 1.19 % (ref 0.37–1.87)
SODIUM SERPL-SCNC: 137 MMOL/L (ref 136–145)
TIBC SERPL-MCNC: 383 UG/DL (ref 250–450)
VIT B12 SERPL-MCNC: 399 PG/ML (ref 100–900)
WBC # BLD AUTO: 8.88 THOUSAND/UL (ref 4.31–10.16)

## 2021-09-29 PROCEDURE — 83615 LACTATE (LD) (LDH) ENZYME: CPT

## 2021-09-29 PROCEDURE — 83540 ASSAY OF IRON: CPT

## 2021-09-29 PROCEDURE — 85025 COMPLETE CBC W/AUTO DIFF WBC: CPT

## 2021-09-29 PROCEDURE — 86255 FLUORESCENT ANTIBODY SCREEN: CPT

## 2021-09-29 PROCEDURE — 80053 COMPREHEN METABOLIC PANEL: CPT

## 2021-09-29 PROCEDURE — 82728 ASSAY OF FERRITIN: CPT

## 2021-09-29 PROCEDURE — 85045 AUTOMATED RETICULOCYTE COUNT: CPT

## 2021-09-29 PROCEDURE — 82746 ASSAY OF FOLIC ACID SERUM: CPT

## 2021-09-29 PROCEDURE — 83550 IRON BINDING TEST: CPT

## 2021-09-29 PROCEDURE — 86140 C-REACTIVE PROTEIN: CPT

## 2021-09-29 PROCEDURE — 86038 ANTINUCLEAR ANTIBODIES: CPT

## 2021-09-29 PROCEDURE — 85652 RBC SED RATE AUTOMATED: CPT

## 2021-09-29 PROCEDURE — 36415 COLL VENOUS BLD VENIPUNCTURE: CPT

## 2021-09-29 PROCEDURE — 86880 COOMBS TEST DIRECT: CPT

## 2021-09-29 PROCEDURE — 82607 VITAMIN B-12: CPT

## 2021-09-29 PROCEDURE — 83625 ASSAY OF LDH ENZYMES: CPT

## 2021-10-01 ENCOUNTER — PATIENT OUTREACH (OUTPATIENT)
Dept: CASE MANAGEMENT | Facility: OTHER | Age: 51
End: 2021-10-01

## 2021-10-01 LAB
C-ANCA TITR SER IF: NORMAL TITER
MYELOPEROXIDASE AB SER IA-ACNC: <9 U/ML (ref 0–9)
P-ANCA ATYPICAL TITR SER IF: NORMAL TITER
P-ANCA TITR SER IF: NORMAL TITER
PROTEINASE3 AB SER IA-ACNC: <3.5 U/ML (ref 0–3.5)
RYE IGE QN: NEGATIVE

## 2021-10-04 LAB
LDH SERPL-CCNC: 183 IU/L (ref 119–226)
LDH1 CFR SERPL ELPH: 20 % (ref 17–32)
LDH2 CFR SERPL ELPH: 37 % (ref 25–40)
LDH3 CFR SERPL ELPH: 29 % (ref 17–27)
LDH4 CFR SERPL ELPH: 9 % (ref 5–13)
LDH5 CFR SERPL ELPH: 5 % (ref 4–20)

## 2021-10-11 ENCOUNTER — PATIENT OUTREACH (OUTPATIENT)
Dept: FAMILY MEDICINE CLINIC | Facility: CLINIC | Age: 51
End: 2021-10-11

## 2021-10-19 ENCOUNTER — PATIENT OUTREACH (OUTPATIENT)
Dept: FAMILY MEDICINE CLINIC | Facility: CLINIC | Age: 51
End: 2021-10-19

## 2021-10-20 DIAGNOSIS — F51.01 PRIMARY INSOMNIA: ICD-10-CM

## 2021-10-20 DIAGNOSIS — F41.0 PANIC ATTACK: ICD-10-CM

## 2021-10-20 RX ORDER — ALPRAZOLAM 0.5 MG/1
0.5 TABLET ORAL 2 TIMES DAILY PRN
Qty: 60 TABLET | Refills: 0 | Status: SHIPPED | OUTPATIENT
Start: 2021-10-20 | End: 2021-11-29 | Stop reason: SDUPTHER

## 2021-10-20 RX ORDER — TEMAZEPAM 30 MG/1
CAPSULE ORAL
Qty: 60 CAPSULE | Refills: 0 | Status: SHIPPED | OUTPATIENT
Start: 2021-10-20 | End: 2021-11-29 | Stop reason: SDUPTHER

## 2021-10-29 ENCOUNTER — PATIENT OUTREACH (OUTPATIENT)
Dept: CASE MANAGEMENT | Facility: OTHER | Age: 51
End: 2021-10-29

## 2021-11-01 ENCOUNTER — PATIENT OUTREACH (OUTPATIENT)
Dept: CASE MANAGEMENT | Facility: OTHER | Age: 51
End: 2021-11-01

## 2021-11-05 ENCOUNTER — PATIENT OUTREACH (OUTPATIENT)
Dept: FAMILY MEDICINE CLINIC | Facility: CLINIC | Age: 51
End: 2021-11-05

## 2021-11-10 ENCOUNTER — OFFICE VISIT (OUTPATIENT)
Dept: FAMILY MEDICINE CLINIC | Facility: CLINIC | Age: 51
End: 2021-11-10
Payer: COMMERCIAL

## 2021-11-10 ENCOUNTER — PATIENT OUTREACH (OUTPATIENT)
Dept: CASE MANAGEMENT | Facility: OTHER | Age: 51
End: 2021-11-10

## 2021-11-10 VITALS
SYSTOLIC BLOOD PRESSURE: 128 MMHG | HEART RATE: 72 BPM | OXYGEN SATURATION: 98 % | DIASTOLIC BLOOD PRESSURE: 82 MMHG | BODY MASS INDEX: 23.81 KG/M2 | WEIGHT: 151.7 LBS | TEMPERATURE: 97.9 F | HEIGHT: 67 IN

## 2021-11-10 DIAGNOSIS — M25.50 ARTHRALGIA, UNSPECIFIED JOINT: ICD-10-CM

## 2021-11-10 DIAGNOSIS — L50.9 HIVES: Primary | ICD-10-CM

## 2021-11-10 PROCEDURE — 3008F BODY MASS INDEX DOCD: CPT | Performed by: NURSE PRACTITIONER

## 2021-11-10 PROCEDURE — 4004F PT TOBACCO SCREEN RCVD TLK: CPT | Performed by: NURSE PRACTITIONER

## 2021-11-10 PROCEDURE — 3725F SCREEN DEPRESSION PERFORMED: CPT | Performed by: NURSE PRACTITIONER

## 2021-11-10 PROCEDURE — 3079F DIAST BP 80-89 MM HG: CPT | Performed by: NURSE PRACTITIONER

## 2021-11-10 PROCEDURE — 3074F SYST BP LT 130 MM HG: CPT | Performed by: NURSE PRACTITIONER

## 2021-11-10 PROCEDURE — 99214 OFFICE O/P EST MOD 30 MIN: CPT | Performed by: NURSE PRACTITIONER

## 2021-11-10 RX ORDER — MELOXICAM 15 MG/1
15 TABLET ORAL DAILY
Qty: 15 TABLET | Refills: 0 | Status: SHIPPED | OUTPATIENT
Start: 2021-11-10 | End: 2022-03-08 | Stop reason: ALTCHOICE

## 2021-11-10 RX ORDER — PREDNISONE 10 MG/1
TABLET ORAL
Qty: 26 TABLET | Refills: 0 | Status: SHIPPED | OUTPATIENT
Start: 2021-11-10 | End: 2022-03-08 | Stop reason: ALTCHOICE

## 2021-11-10 RX ORDER — MONTELUKAST SODIUM 10 MG/1
10 TABLET ORAL
Qty: 30 TABLET | Refills: 5 | Status: SHIPPED | OUTPATIENT
Start: 2021-11-10 | End: 2022-03-08 | Stop reason: ALTCHOICE

## 2021-11-24 ENCOUNTER — PATIENT OUTREACH (OUTPATIENT)
Dept: FAMILY MEDICINE CLINIC | Facility: CLINIC | Age: 51
End: 2021-11-24

## 2021-11-27 DIAGNOSIS — L50.9 URTICARIA: ICD-10-CM

## 2021-11-29 DIAGNOSIS — K21.9 GASTROESOPHAGEAL REFLUX DISEASE: ICD-10-CM

## 2021-11-29 DIAGNOSIS — L50.9 URTICARIA: ICD-10-CM

## 2021-11-29 DIAGNOSIS — I75.022 BLUE TOE SYNDROME OF LEFT LOWER EXTREMITY (HCC): ICD-10-CM

## 2021-11-29 DIAGNOSIS — F51.01 PRIMARY INSOMNIA: ICD-10-CM

## 2021-11-29 DIAGNOSIS — F41.0 PANIC ATTACK: ICD-10-CM

## 2021-11-29 RX ORDER — FAMOTIDINE 40 MG/1
40 TABLET, FILM COATED ORAL
Qty: 90 TABLET | Refills: 0 | Status: SHIPPED | OUTPATIENT
Start: 2021-11-29 | End: 2022-03-03 | Stop reason: SDUPTHER

## 2021-11-29 RX ORDER — CETIRIZINE HYDROCHLORIDE 10 MG/1
10 TABLET ORAL DAILY
Qty: 90 TABLET | Refills: 0 | Status: SHIPPED | OUTPATIENT
Start: 2021-11-29 | End: 2022-03-03 | Stop reason: SDUPTHER

## 2021-11-29 RX ORDER — CETIRIZINE HYDROCHLORIDE 10 MG/1
TABLET ORAL
Qty: 90 TABLET | Refills: 0 | Status: SHIPPED | OUTPATIENT
Start: 2021-11-29 | End: 2021-11-29 | Stop reason: SDUPTHER

## 2021-11-29 RX ORDER — NIFEDIPINE 60 MG/1
60 TABLET, EXTENDED RELEASE ORAL DAILY
Qty: 90 TABLET | Refills: 0 | Status: SHIPPED | OUTPATIENT
Start: 2021-11-29 | End: 2022-03-03

## 2021-11-29 RX ORDER — TEMAZEPAM 30 MG/1
CAPSULE ORAL
Qty: 60 CAPSULE | Refills: 0 | Status: SHIPPED | OUTPATIENT
Start: 2021-11-29 | End: 2022-01-31 | Stop reason: SDUPTHER

## 2021-11-29 RX ORDER — ALPRAZOLAM 0.5 MG/1
0.5 TABLET ORAL 2 TIMES DAILY PRN
Qty: 60 TABLET | Refills: 0 | Status: SHIPPED | OUTPATIENT
Start: 2021-11-29 | End: 2021-12-30 | Stop reason: SDUPTHER

## 2021-12-01 ENCOUNTER — TELEMEDICINE (OUTPATIENT)
Dept: BEHAVIORAL/MENTAL HEALTH CLINIC | Facility: CLINIC | Age: 51
End: 2021-12-01
Payer: COMMERCIAL

## 2021-12-01 DIAGNOSIS — F41.9 ANXIETY AND DEPRESSION: Primary | ICD-10-CM

## 2021-12-01 DIAGNOSIS — F32.A ANXIETY AND DEPRESSION: Primary | ICD-10-CM

## 2021-12-01 PROCEDURE — 90834 PSYTX W PT 45 MINUTES: CPT | Performed by: SOCIAL WORKER

## 2021-12-02 ENCOUNTER — PATIENT OUTREACH (OUTPATIENT)
Dept: CASE MANAGEMENT | Facility: OTHER | Age: 51
End: 2021-12-02

## 2021-12-17 ENCOUNTER — PATIENT OUTREACH (OUTPATIENT)
Dept: FAMILY MEDICINE CLINIC | Facility: CLINIC | Age: 51
End: 2021-12-17

## 2021-12-30 DIAGNOSIS — F41.0 PANIC ATTACK: ICD-10-CM

## 2021-12-30 RX ORDER — ALPRAZOLAM 0.5 MG/1
0.5 TABLET ORAL 2 TIMES DAILY PRN
Qty: 60 TABLET | Refills: 0 | Status: SHIPPED | OUTPATIENT
Start: 2021-12-30 | End: 2022-01-31 | Stop reason: SDUPTHER

## 2022-01-06 ENCOUNTER — PATIENT OUTREACH (OUTPATIENT)
Dept: FAMILY MEDICINE CLINIC | Facility: CLINIC | Age: 52
End: 2022-01-06

## 2022-01-06 NOTE — PROGRESS NOTES
Chart reviewed  Patient no-showed therapy appt on 12/29/2021 with Kavitha Baez  Call placed to patient  Patient stated her router stopped working at her house, which is why she could not attend appt with Elisa Woodruff  Encouraged patient to call PCP office to reschedule if this would occur again  Patient agreed  Patient stated she is prepping for upcoming hearing, scheduled for next Thursday 1/13/2022  Patient plans to call PCP office to reschedule therapy appt after hearing has taken place  Patient called today to review SNAP benefits  Patient will go onto [facility] website today to complete this task  Encouraged patient to call OP SWCM or CHW if assistance is needed with this process  Patient agreed  No other needs at this time  Will continue to follow

## 2022-01-10 ENCOUNTER — PATIENT OUTREACH (OUTPATIENT)
Dept: CASE MANAGEMENT | Facility: OTHER | Age: 52
End: 2022-01-10

## 2022-01-11 ENCOUNTER — PATIENT OUTREACH (OUTPATIENT)
Dept: CASE MANAGEMENT | Facility: OTHER | Age: 52
End: 2022-01-11

## 2022-01-26 ENCOUNTER — PATIENT OUTREACH (OUTPATIENT)
Dept: CASE MANAGEMENT | Facility: OTHER | Age: 52
End: 2022-01-26

## 2022-01-26 NOTE — PROGRESS NOTES
CHW messaged Preston Walker to check status and also see if DHS reached out to her regarding renewal  COM[employer] janae renewal was  Processed  CHW received text message back from patient asking if she can call me CHW responded and agreed  Preston Walker sounded very emotional on the phone  When I asked her what is going on she said she is just so worried about the custody hearing next Wednesday 02/02/22 and that she found more devices in her home that her ex  had installed, including things in outlets, behind her TV's, in light fixtures  I strongly encouraged to Preston Walekr that she contact [facility] to assist but she declines at this time  She says the  she is working with will be coming to a determination this week and she wants to wait until after the custody hearing next week to call them  CHW asked about [place], SNAP and Preston Walker informed me hr SNAP was decreased to $58 00 due to her income increasing with Child and Spousal support  Preston Walker is ok with this, she also doesn't want to Preston Walker had to end call due to another call coming in  CHW will follow-up with patient either next week or when [facility] returns

## 2022-01-26 NOTE — PROGRESS NOTES
CHW received text message from patient this day asking for me to call her to disucss SNAP  CHW agreed  CHW called patient and she informed me she received a renewal for SNAP, [place] and wanted to know if I can assist her with completing this  CHW agreed and I went over the renewal for patient online  CHW explained next steps and let Valeria Willis know I would reach out to her in a day or two to see what DHS needs proof of  CHW will update [facility] MSW as well  No other concerns at this moment

## 2022-01-26 NOTE — PROGRESS NOTES
CHW checked [facility] and [facility] is just requesting copy of front and back of insurance cards for patient and kids  CHW texted Ben Citlali to let her know  She agreed and said she would send this to me  CHW will then upload to [facility]  CHW received picture message from Ben Citlali with insurance card and uploaded to [facility] for review  Will follow-up to check status

## 2022-01-31 ENCOUNTER — PATIENT OUTREACH (OUTPATIENT)
Dept: CASE MANAGEMENT | Facility: OTHER | Age: 52
End: 2022-01-31

## 2022-01-31 DIAGNOSIS — F51.01 PRIMARY INSOMNIA: ICD-10-CM

## 2022-01-31 DIAGNOSIS — F41.0 PANIC ATTACK: ICD-10-CM

## 2022-01-31 RX ORDER — TEMAZEPAM 30 MG/1
CAPSULE ORAL
Qty: 60 CAPSULE | Refills: 0 | Status: SHIPPED | OUTPATIENT
Start: 2022-01-31 | End: 2022-03-14 | Stop reason: SDUPTHER

## 2022-01-31 RX ORDER — ALPRAZOLAM 0.5 MG/1
0.5 TABLET ORAL 2 TIMES DAILY PRN
Qty: 60 TABLET | Refills: 0 | Status: SHIPPED | OUTPATIENT
Start: 2022-01-31 | End: 2022-03-14 | Stop reason: SDUPTHER

## 2022-01-31 NOTE — TELEPHONE ENCOUNTER
01/03/2022  1  01/03/2022  HYDROCODONE-ACETAMIN 5-325 MG  120 0  30  RU CARMEN  [id number]  [facility] ([id number])  0  20 0 MME  [place]    12/30/2021  2  12/30/2021  ZOLPIDEM TARTRATE 10 MG TABLET  30 0  30  RU CARMEN  [id number]  [facility] ([id number])  0   [place]    12/03/2021  1  12/03/2021  HYDROCODONE-ACETAMIN 5-325 MG  120 0  30  RU CARMEN  [id number]  WEGMA ([id number])  0  20 0 MME  Comm Ins

## 2022-01-31 NOTE — PROGRESS NOTES
CHW received text message from patient asking if she could call me, chw responded back to patient agreeing  CHW spoke with Roly Steven and she asked how she could get a list or copy of her diagnosis  I informed Roly Steven that on MyChart she can obtain this of which she was able to retrieve  Roly Steven then went on to talk about how she needs this for the [facility] and or FBI agent that she state's she's been working with  She is worried because this Wednesday is the custody hearing and Roly Steven was told by either the FBI she is working with or  that she shouldn't mention anything about the investigation  I explained to Roly Steven I think it just may be they have everything they need evidence pfeiffer and that for the hearing to go well they don't want her to say something that may jeopardize her case  Roly Steven states her TV's are tapped, with signs on them saying RIP and that she gets advertisements for things on her phone that are telling her to kill herself ect  I asked Roly Steven if she is planning on hurting herself after seeing these things and she states she is not, just that she knows it's her ex  doing these things  I encouraged Roly Steven to call [facility] again so they can help her with other resources so she can feel secure in the home ( she hasn't left her home either because she believes her car has a GPS on it)  Roly Majorrie states she will call [facility] after the hearing this Wednesday  CHW informed Roly Steven I would like for her to speak with [facility] MSW Ronnie Baeza but that she is away this week  Roly Steven agreed but said that after Wednesday it would be better for her  CHW agreed, and will inform OPCM  No other needs at the moment

## 2022-02-03 ENCOUNTER — PATIENT OUTREACH (OUTPATIENT)
Dept: FAMILY MEDICINE CLINIC | Facility: CLINIC | Age: 52
End: 2022-02-03

## 2022-02-04 ENCOUNTER — PATIENT OUTREACH (OUTPATIENT)
Dept: FAMILY MEDICINE CLINIC | Facility: CLINIC | Age: 52
End: 2022-02-04

## 2022-02-04 NOTE — PROGRESS NOTES
Voicemail received from [place] with [employer] requesting return call from OP [facility] to discuss report of need  Return call placed to [place]; no answer, voicemail left, and awaiting return call

## 2022-02-15 ENCOUNTER — PATIENT OUTREACH (OUTPATIENT)
Dept: FAMILY MEDICINE CLINIC | Facility: CLINIC | Age: 52
End: 2022-02-15

## 2022-02-15 NOTE — PROGRESS NOTES
Call received from Fredrick Morales [facility]  Manas Carrion [phone] who requested additional information  Needed/available information given  Will follow

## 2022-02-21 ENCOUNTER — PATIENT OUTREACH (OUTPATIENT)
Dept: CASE MANAGEMENT | Facility: OTHER | Age: 52
End: 2022-02-21

## 2022-02-21 NOTE — PROGRESS NOTES
CHW reached out to patient via text message to see how patient is doing and follow-up  No response back from patient however  CHW updated [facility] MSW

## 2022-03-02 DIAGNOSIS — I75.022 BLUE TOE SYNDROME OF LEFT LOWER EXTREMITY (HCC): ICD-10-CM

## 2022-03-03 DIAGNOSIS — L50.9 URTICARIA: ICD-10-CM

## 2022-03-03 DIAGNOSIS — K21.9 GASTROESOPHAGEAL REFLUX DISEASE: ICD-10-CM

## 2022-03-03 RX ORDER — NIFEDIPINE 60 MG/1
TABLET, EXTENDED RELEASE ORAL
Qty: 90 TABLET | Refills: 0 | Status: SHIPPED | OUTPATIENT
Start: 2022-03-03 | End: 2022-05-31

## 2022-03-03 RX ORDER — FAMOTIDINE 40 MG/1
40 TABLET, FILM COATED ORAL
Qty: 90 TABLET | Refills: 0 | Status: SHIPPED | OUTPATIENT
Start: 2022-03-03 | End: 2022-05-31

## 2022-03-03 RX ORDER — CETIRIZINE HYDROCHLORIDE 10 MG/1
10 TABLET ORAL DAILY
Qty: 90 TABLET | Refills: 0 | Status: SHIPPED | OUTPATIENT
Start: 2022-03-03 | End: 2022-06-19 | Stop reason: SDUPTHER

## 2022-03-08 ENCOUNTER — OFFICE VISIT (OUTPATIENT)
Dept: FAMILY MEDICINE CLINIC | Facility: CLINIC | Age: 52
End: 2022-03-08
Payer: COMMERCIAL

## 2022-03-08 VITALS
WEIGHT: 158.5 LBS | OXYGEN SATURATION: 99 % | HEART RATE: 74 BPM | BODY MASS INDEX: 24.88 KG/M2 | HEIGHT: 67 IN | DIASTOLIC BLOOD PRESSURE: 90 MMHG | TEMPERATURE: 96.7 F | SYSTOLIC BLOOD PRESSURE: 128 MMHG

## 2022-03-08 DIAGNOSIS — R10.13 EPIGASTRIC PAIN: Primary | ICD-10-CM

## 2022-03-08 DIAGNOSIS — F22 PARANOID STATE (HCC): ICD-10-CM

## 2022-03-08 PROCEDURE — 99215 OFFICE O/P EST HI 40 MIN: CPT | Performed by: NURSE PRACTITIONER

## 2022-03-08 PROCEDURE — 4004F PT TOBACCO SCREEN RCVD TLK: CPT | Performed by: NURSE PRACTITIONER

## 2022-03-08 PROCEDURE — 3074F SYST BP LT 130 MM HG: CPT | Performed by: NURSE PRACTITIONER

## 2022-03-08 PROCEDURE — 3008F BODY MASS INDEX DOCD: CPT | Performed by: NURSE PRACTITIONER

## 2022-03-08 PROCEDURE — 3080F DIAST BP >= 90 MM HG: CPT | Performed by: NURSE PRACTITIONER

## 2022-03-08 NOTE — PROGRESS NOTES
Assessment/Plan:     Diagnoses and all orders for this visit:    Epigastric pain    Paranoid state ([facility] [address] )    #1 Epigastric pain  Discussed with patient plan to have her continue use of famotidine  #2 Paranoid state  Encouraged patient to continue with in person counseling and discussed trying other mental health medications  Patient instructed to call if no improvement in 72 hours or symptoms worsen    Subjective:      Patient ID: Romulo Santana is a 45 y o  female  45 y  o female presenting at first with complaints of stomach pains but reports that she thinks that the pains are all related to stress  She has brought both her son and daughter to the appointment with her today to discuss how her ex- is making their lives terrible  She states that the court has ordered the children spend two hours twice a week with their father  Both the children report that the father does not communicate with them much during the visits make them awkward  The patient reports that she placed a tracking application on her daughter's phone prior to her going with the father and the father either removed the giovanna or disabled someway  The patient reports that the police and the FBI are investigating her ex- for tampering with her phone, computer and vandalizing property since their separation  The patient thinks that her ex- drives past her house at all hours of the day to harass her and the children  She reports that she is unable to use any type of eletronic device because they are or get hacked by the ex-  The son is supportive of his mother and the daughter remains quiet unless directly spoken to during conversations  The patient has no social network of family or friends  She has her children and if anything would happen to them she would be lost  She does not make any physical threats against anyone during her conversations   Her train of thoughts are scattered at times but always [place] around her fear of the ex-'s hacking of her life         Family History   Problem Relation Age of Onset    Hypertension Mother     Coronary artery disease Father     Heart failure Father     Heart attack Father      Social History     Socioeconomic History    Marital status: /Civil Union     Spouse name: Not on file    Number of children: Not on file    Years of education: Not on file    Highest education level: Not on file   Occupational History    Not on file   Tobacco Use    Smoking status: Current Every Day Smoker     Packs/day: 0 50     Years: 30 00     Pack years: 15 00     Types: Cigarettes    Smokeless tobacco: Never Used   Vaping Use    Vaping Use: Never used   Substance and Sexual Activity    Alcohol use: Not Currently    Drug use: Never    Sexual activity: Not Currently     Partners: Male     Birth control/protection: None   Other Topics Concern    Not on file   Social History Narrative    Caffeine use    Daily coffee consumption     Social Determinants of Health     Financial Resource Strain: Not on file   Food Insecurity: Not on file   Transportation Needs: Not on file   Physical Activity: Not on file   Stress: Not on file   Social Connections: Not on file   Intimate Partner Violence: Not on file   Housing Stability: Not on file     E-Cigarette/Vaping    E-Cigarette Use Never User      E-Cigarette/Vaping Substances    Nicotine No     THC No     CBD No     Flavoring No     Other No     Unknown No      Past Medical History:   Diagnosis Date    Abnormal uterine bleeding (AUB) 1/15/2020    Added automatically from request for surgery [id number]    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    Clotting disorder ([facility] [address] )     Depression     Fibroid     GERD (gastroesophageal reflux disease)     Palpitations     S/P laparoscopy 5/11/2020    Status post myomectomy 5/11/2020    Status post vaginal hysterectomy 5/11/2020    Submucous leiomyoma of uterus 5/11/2020     Past Surgical History: Procedure Laterality Date    EGD      MI CYSTOURETHROSCOPY N/A 5/11/2020    Procedure: CYSTOSCOPY;  Surgeon: Osmany Aragon MD;  [facility];  Service: Gynecology    MI EXCIS UTERINE FIBROID, [address] N/A 5/11/2020    Procedure: MYOMECTOMY WITH HYSTEROSCOPY;  Surgeon: Osmany Aragon MD;  [facility];  Service: Gynecology    MI LAP,DIAGNOSTIC ABDOMEN N/A 5/11/2020    Procedure: LAPAROSCOPY DIAGNOSTIC;  Surgeon: Osmany Aragon MD;  [facility];  Service: Gynecology    MI VAG HYST,RMV TUBE/OVARY N/A 5/11/2020    Procedure: TOTAL VAGINAL HYSTERECTOMY WITH BILATERAL SALPINGECTOMY;  Surgeon: Osmany Aragon MD;  [facility];  Service: Gynecology    WISDOM TOOTH EXTRACTION       Allergies   Allergen Reactions    Duloxetine Hcl Hives    Eggs Or Egg-Derived Products - Food Allergy Abdominal Pain    Zolpidem Confusion       Current Outpatient Medications:     ALPRAZolam (XANAX) 0 5 mg tablet, Take 1 tablet (0 5 mg total) by mouth 2 (two) times a day as needed for anxiety, Disp: 60 tablet, Rfl: 0    aspirin 325 mg tablet, Take 325 mg by mouth daily, Disp: , Rfl:     cetirizine (ZyrTEC) 10 mg tablet, Take 1 tablet (10 mg total) by mouth daily, Disp: 90 tablet, Rfl: 0    famotidine (PEPCID) 40 MG tablet, Take 1 tablet (40 mg total) by mouth daily in the early morning, Disp: 90 tablet, Rfl: 0    NIFEdipine (PROCARDIA XL) 60 mg 24 hr tablet, TAKE 1 TABLET(60 MG) BY MOUTH DAILY, Disp: 90 tablet, Rfl: 0    temazepam (RESTORIL) 30 mg capsule, TAKE 2 CAPSULES BY MOUTH AT BEDTIME, Disp: 60 capsule, Rfl: 0    Review of Systems   Constitutional: Positive for fatigue  Respiratory: Negative  Cardiovascular: Negative  Gastrointestinal: Positive for abdominal pain  Negative for abdominal distention, diarrhea, nausea and vomiting  Musculoskeletal: Negative  Neurological: Negative  Psychiatric/Behavioral: Positive for dysphoric mood   Negative for self-injury and suicidal ideas  The patient is nervous/anxious and is hyperactive  Objective:    /90   Pulse 74   Temp (!) 96 7 °F (35 9 °C)   Ht 5' 7" (1 702 m)   Wt 71 9 kg (158 lb 8 oz)   LMP 04/26/2020   SpO2 99%   BMI 24 82 kg/m²  (Reviewed)     Physical Exam  Vitals reviewed  Constitutional:       General: She is not in acute distress  Appearance: She is well-developed and well-groomed  She is not ill-appearing  HENT:      Head: Normocephalic and atraumatic  Right Ear: External ear normal       Left Ear: External ear normal       Nose:      Comments: Patient had a facial covering in place as per office protocol  Eyes:      General: Lids are normal       Extraocular Movements: Extraocular movements intact  Conjunctiva/sclera: Conjunctivae normal       Pupils: Pupils are equal, round, and reactive to light  Neck:      Thyroid: No thyromegaly or thyroid tenderness  Trachea: Trachea and phonation normal    Cardiovascular:      Rate and Rhythm: Normal rate and regular rhythm  Pulses: Normal pulses  Heart sounds: Normal heart sounds  Pulmonary:      Effort: Pulmonary effort is normal       Breath sounds: Normal breath sounds  Abdominal:      General: Bowel sounds are normal  There is no distension  Palpations: Abdomen is soft  Tenderness: There is no abdominal tenderness  Musculoskeletal:      Cervical back: Neck supple  Skin:     General: Skin is warm and dry  Capillary Refill: Capillary refill takes less than 2 seconds  Neurological:      General: No focal deficit present  Mental Status: She is alert and oriented to person, place, and time  Psychiatric:         Mood and Affect: Mood is anxious  Affect is tearful  Speech: Speech is rapid and pressured  Behavior: Behavior is hyperactive  Behavior is cooperative  Thought Content: Thought content is paranoid  Thought content does not include homicidal or suicidal ideation   Thought content does not include homicidal or suicidal plan  Judgment: Judgment is inappropriate

## 2022-03-10 ENCOUNTER — PATIENT OUTREACH (OUTPATIENT)
Dept: CASE MANAGEMENT | Facility: OTHER | Age: 52
End: 2022-03-10

## 2022-03-10 NOTE — PROGRESS NOTES
[facility] called patient to follow-up  Patient previously texted me earlier this week asking to speak to me but never received a call from patient  Satinder Nunn informed me she originally texted me because she received a large lumpsum of money deposited into her acct and she wasn't sure from where  Satinder Nunn looked into this and it was the back-pay from [employer] that she received and she is now ok about  Satinder Nunn then informed me she received a call from a Lianet but doesn't know exactly where she is from, just that she wanted to make a home visit with her and her kids but Satinder Nunn told her at first she wanted to clean up and didn't schedule anything  Now, Satinder Nunn tried to call her back but was having trouble reaching her and Satinder Nunn would like me to call her to see if I can reach her  [facility] agreed  I told Pialr Portillo would have [facility] MSW Jeff Fraserers follow-up with this since my last day will be tomorrow with Peter Nunn agreed  I also provided Satinder Nunn with [facility] MSW's office number again in case she has questions  Satinder Nunn understands OPCM MSW will still be reaching out to her  [facility] reached out to this Lianet, however, not much information was provided  I asked Lianet to give Satinder Nunn a call because she's been trying to reach her  Runell Brochure says she will call her  CMOC will update OPCM MSW  CMOC also closing patient's case at this time and discussed closure with OPCM

## 2022-03-14 DIAGNOSIS — F51.01 PRIMARY INSOMNIA: ICD-10-CM

## 2022-03-14 DIAGNOSIS — F41.0 PANIC ATTACK: ICD-10-CM

## 2022-03-15 RX ORDER — TEMAZEPAM 30 MG/1
CAPSULE ORAL
Qty: 60 CAPSULE | Refills: 0 | Status: SHIPPED | OUTPATIENT
Start: 2022-03-15 | End: 2022-04-14 | Stop reason: SDUPTHER

## 2022-03-15 RX ORDER — ALPRAZOLAM 0.5 MG/1
0.5 TABLET ORAL 2 TIMES DAILY PRN
Qty: 60 TABLET | Refills: 0 | Status: SHIPPED | OUTPATIENT
Start: 2022-03-15 | End: 2022-04-14 | Stop reason: SDUPTHER

## 2022-03-15 NOTE — TELEPHONE ENCOUNTER
01/31/2022 01/31/2022 Temazepam 60 0 30 30 MG NA AUNG [employer]  [employer] 0 / 0 [place]    1  [id number] 01/31/2022 01/31/2022 ALPRAZolam 60 0 30 0 5 MG NA AUNG [employer]    [employer]

## 2022-03-17 ENCOUNTER — PATIENT OUTREACH (OUTPATIENT)
Dept: FAMILY MEDICINE CLINIC | Facility: CLINIC | Age: 52
End: 2022-03-17

## 2022-03-17 NOTE — PROGRESS NOTES
Chart reviewed  Patient received back-pay from [employer] and Care Management  HA[facility]) [address] [facility] [phone] per patient's request as patient was attempting to schedule home visit but has not received [facility]  Spoke with patient who is upset that [facility]  Provided support and ensured patient that OP [facility] is able to assist and continue supporting any needs  Patient appreciative  Patient stated she will be going for intake appt next Friday at 1PM to begin Reunification Counseling per the court's request   This counseling will be for patient's children and their father but intake/1st appt will be with patient  Patient was told in court that patient's ex- will be paying for this expense; encouraged patient to relay this same information to counseling office and direct office to her  as needed  Lianet became involved in patient's case after [facility]  Marva De La Garza completed assessment and home visit  Patient requested OP [facility] outreach Lianet on her behalf to ask about scheduling home visit as patient would like to have this completed but she has not received return call from [facility]  Voicemail left for Lianet who is [facility]   Requested return call to discuss patient's case, support that Lianet is able to provide, and request that she return call to patient to schedule home visit  Awaiting return call from Lianet

## 2022-03-21 ENCOUNTER — PATIENT OUTREACH (OUTPATIENT)
Dept: FAMILY MEDICINE CLINIC | Facility: CLINIC | Age: 52
End: 2022-03-21

## 2022-03-21 NOTE — PROGRESS NOTES
Call received from patient asking if OP SWCM received return call from [facility] through [facility]  No call received from [facility]; OP SWCM left voicemail on 3/17 requesting return call to OP [facility] and patient  Patient informed OP YADIRACM that her children did not complete court-ordered visit with their father yesterday  Patient's dtr texted her father stating she and her brother were not feeling well and did not want to attend visit with him  Patient's ex asked patient for information on what illnesses their children have; patient informed her ex that both children are experiencing depression and nervousness about these visits  Patient states he is scaring them and making fun of them  Advised patient that she needs to discuss this with her  as visits are not being followed through as decided by the courts  Patient agreed  Patient will begin Reunification Counseling through the court on Friday 3/25

## 2022-04-04 ENCOUNTER — PATIENT OUTREACH (OUTPATIENT)
Dept: FAMILY MEDICINE CLINIC | Facility: CLINIC | Age: 52
End: 2022-04-04

## 2022-04-04 NOTE — PROGRESS NOTES
Call placed to patient to check status  Patient stated Reunification Counseling started on 3/25  She had individual session, her ex- had individual sessions, and both of her children had individual sessions as well  There will be 8 sessions  Next session will be with patient's children and their father  Discussed patient keeping open communication with her ; patient agreed and has appt with her  on 4/11  Patient has not received return call from [facility] [phone]  OP [facility] offered to outreach Lianet again to check status as patient thought home visit was going to be scheduled  Patient agreed  Spoke with Lianet who was unsure why patient thought she would be doing home visit  Home visit will not be scheduled  Lianet could not disclose information to OP SWCM but will speak with her supervisor regarding this  Requested return call be made to patient  Patient aware

## 2022-04-14 DIAGNOSIS — F41.0 PANIC ATTACK: ICD-10-CM

## 2022-04-14 DIAGNOSIS — F51.01 PRIMARY INSOMNIA: ICD-10-CM

## 2022-04-14 RX ORDER — TEMAZEPAM 30 MG/1
CAPSULE ORAL
Qty: 60 CAPSULE | Refills: 0 | Status: SHIPPED | OUTPATIENT
Start: 2022-04-14 | End: 2022-05-19 | Stop reason: SDUPTHER

## 2022-04-14 RX ORDER — ALPRAZOLAM 0.5 MG/1
0.5 TABLET ORAL 2 TIMES DAILY PRN
Qty: 60 TABLET | Refills: 0 | Status: SHIPPED | OUTPATIENT
Start: 2022-04-14 | End: 2022-05-19 | Stop reason: SDUPTHER

## 2022-04-14 NOTE — TELEPHONE ENCOUNTER
03/15/2022  03/15/2022 Temazepam (Capsule)  60 0 30 30 MG NA 30 [address] , [place] [facility] [employer] 0 / 0 [place]    1  [id number] 03/15/2022  03/15/2022 ALPRAZolam (Tablet)  60 0 30 0 5 MG NA [facility] [employer]    [employer]

## 2022-04-18 ENCOUNTER — PATIENT OUTREACH (OUTPATIENT)
Dept: FAMILY MEDICINE CLINIC | Facility: CLINIC | Age: 52
End: 2022-04-18

## 2022-04-18 NOTE — PROGRESS NOTES
Call placed to patient to check status and follow-up from recent  appt on 4/11  Will also check status of reunification counseling sessions between patient's ex- and her children  No answer, voicemail left, and awaiting return call

## 2022-04-25 ENCOUNTER — PATIENT OUTREACH (OUTPATIENT)
Dept: FAMILY MEDICINE CLINIC | Facility: CLINIC | Age: 52
End: 2022-04-25

## 2022-04-25 NOTE — PROGRESS NOTES
2nd outreach attempt to patient to check status and follow-up on recent  appt on 4/11  Will also check status of reunification counseling sessions between patient's ex- and her children  No answer, voicemail left, and awaiting return call

## 2022-05-04 ENCOUNTER — PATIENT OUTREACH (OUTPATIENT)
Dept: FAMILY MEDICINE CLINIC | Facility: CLINIC | Age: 52
End: 2022-05-04

## 2022-05-04 NOTE — PROGRESS NOTES
3rd outreach attempt to patient to check status and follow-up on  appt that took place on 4/11   Will also check status of reunification counseling sessions between patient's ex- and her children  Spoke with patient who stated her children continue to take part in the reunification counseling sessions with their father  Patient states these sessions are not going well  She will be meeting with reunification counselor on her own tomorrow to discuss things  Her ex- has not been picking up their children for visits as scheduled either  Encouraged patient to keep her  informed of this  Patient agreed  Patient did not meet with  on 4/11; she decided to wait and see how reunification counseling sessions would go  She is having difficulty switching some household bills into her name, as bills were in her ex-'s name (N bill)  [facility] informed patient that they would need proof of the divorce in order to switch account information  Suggested patient ask reunification counselor or  for letter since divorce is not yet final     Patient states she has to schedule follow-up appt with PCP as she is having increased difficulty walking far distances (ex  the mall)  States she can only walk 1 floor because her legs get tired  Offered to assist in scheduling PCP appt; patient declined and stated she will call to schedule  Will route to PCP

## 2022-05-19 DIAGNOSIS — F51.01 PRIMARY INSOMNIA: ICD-10-CM

## 2022-05-19 DIAGNOSIS — F41.0 PANIC ATTACK: ICD-10-CM

## 2022-05-20 ENCOUNTER — PATIENT OUTREACH (OUTPATIENT)
Dept: FAMILY MEDICINE CLINIC | Facility: CLINIC | Age: 52
End: 2022-05-20

## 2022-05-20 RX ORDER — TEMAZEPAM 30 MG/1
CAPSULE ORAL
Qty: 60 CAPSULE | Refills: 0 | Status: SHIPPED | OUTPATIENT
Start: 2022-05-20 | End: 2022-06-19 | Stop reason: SDUPTHER

## 2022-05-20 RX ORDER — ALPRAZOLAM 0.5 MG/1
0.5 TABLET ORAL 2 TIMES DAILY PRN
Qty: 60 TABLET | Refills: 0 | Status: SHIPPED | OUTPATIENT
Start: 2022-05-20 | End: 2022-06-19 | Stop reason: SDUPTHER

## 2022-05-20 NOTE — PROGRESS NOTES
Chart reviewed  Patient has not yet scheduled follow-up PCP appt  Call placed to patient to check status and ask if assistance is needed to schedule PCP appt  No answer, voicemail left, and awaiting return call

## 2022-05-20 NOTE — TELEPHONE ENCOUNTER
04/14/2022 04/14/2022 ALPRAZolam (Tablet)  60 0 30 0 5 MG NA [employer]  [facility] [employer] 0 / 0 [place]    1  [id number] 04/14/2022 04/14/2022 Temazepam (Capsule)  60 0 30 30 MG NA AUNG [employer]    [employer]

## 2022-05-31 DIAGNOSIS — K21.9 GASTROESOPHAGEAL REFLUX DISEASE: ICD-10-CM

## 2022-05-31 DIAGNOSIS — I75.022 BLUE TOE SYNDROME OF LEFT LOWER EXTREMITY (HCC): ICD-10-CM

## 2022-05-31 RX ORDER — NIFEDIPINE 60 MG/1
TABLET, EXTENDED RELEASE ORAL
Qty: 90 TABLET | Refills: 0 | Status: SHIPPED | OUTPATIENT
Start: 2022-05-31

## 2022-05-31 RX ORDER — FAMOTIDINE 40 MG/1
TABLET, FILM COATED ORAL
Qty: 90 TABLET | Refills: 0 | Status: SHIPPED | OUTPATIENT
Start: 2022-05-31

## 2022-06-03 ENCOUNTER — PATIENT OUTREACH (OUTPATIENT)
Dept: FAMILY MEDICINE CLINIC | Facility: CLINIC | Age: 52
End: 2022-06-03

## 2022-06-03 NOTE — PROGRESS NOTES
2nd outreach attempt to patient to check status  Patient has not yet scheduled follow-up PCP appt  Call placed to patient to check status and ask if assistance is needed to schedule PCP appt  Spoke with patient who stated she is frazzled; shared that reunification counseling sessions did not work out for her children and ex-  The counselor stopped the session earlier and stated it was not going well  Counselor informed patient that her children are old enough to make their own decisions  Counselor is also requesting patient go for a comprehensive psych evaluation  Patient informed her  of this and he asked for contact information for the agent that is investigating things within the home ("bugging" of devices)  Patient agrees to go for psych evaluation and  is requesting this be scheduled before next court hearing at the end of the month  Will assist patient in searching for in-network psychiatrist     Reminded patient that she was planning to schedule PCP appt for herself but no appt has been scheduled at this time  Patient will be calling office to schedule appt for herself and her 2 children  Offered to assist with this but patient states she will call  [facility] [phone] [employer] [employer] - psychiatrist is not in network  [facility]  [phone] - spoke with Germain Del Rio who requested demographics be faxed to [phone] for review  Information faxed  Awaiting determination if able to accept

## 2022-06-10 ENCOUNTER — PATIENT OUTREACH (OUTPATIENT)
Dept: FAMILY MEDICINE CLINIC | Facility: CLINIC | Age: 52
End: 2022-06-10

## 2022-06-10 NOTE — PROGRESS NOTES
Chart reviewed  Patient scheduled to see PCP next week (Wednesday 6/15)  Call placed to [facility]; confirmed they are able to accept patient's insurance  Nina in intake states they attempted to reach patient to schedule but had to leave a voicemail and did not receive return call yet  Call placed to patient to check status and inform of OREN Ribeiro's outreach attempt to schedule therapy/psychiatry appts  No answer, voicemail left, and awaiting return call

## 2022-06-15 ENCOUNTER — OFFICE VISIT (OUTPATIENT)
Dept: FAMILY MEDICINE CLINIC | Facility: CLINIC | Age: 52
End: 2022-06-15
Payer: COMMERCIAL

## 2022-06-15 VITALS
HEIGHT: 67 IN | OXYGEN SATURATION: 100 % | TEMPERATURE: 97.5 F | HEART RATE: 83 BPM | RESPIRATION RATE: 18 BRPM | DIASTOLIC BLOOD PRESSURE: 94 MMHG | SYSTOLIC BLOOD PRESSURE: 138 MMHG | WEIGHT: 150 LBS | BODY MASS INDEX: 23.54 KG/M2

## 2022-06-15 DIAGNOSIS — F34.1 DYSTHYMIA: Primary | ICD-10-CM

## 2022-06-15 PROCEDURE — 3008F BODY MASS INDEX DOCD: CPT | Performed by: NURSE PRACTITIONER

## 2022-06-15 PROCEDURE — 99214 OFFICE O/P EST MOD 30 MIN: CPT | Performed by: NURSE PRACTITIONER

## 2022-06-15 PROCEDURE — 4004F PT TOBACCO SCREEN RCVD TLK: CPT | Performed by: NURSE PRACTITIONER

## 2022-06-15 PROCEDURE — 3725F SCREEN DEPRESSION PERFORMED: CPT | Performed by: NURSE PRACTITIONER

## 2022-06-15 NOTE — PROGRESS NOTES
Assessment/Plan:     Diagnoses and all orders for this visit:    Dysthymia      Discussed with patient plan to go to counseling and learn to manage her moods associated with her anxiety and life situations  Patient instructed to call if no improvement in 72 hours or symptoms worsen    Subjective:      Patient ID: Katy Scott is a 45 y o  female  45 y  o female presenting with her son and daughter to discuss depression  She reports the current status of her divorce and child custody of the two child  She reports that the children do not want to see her estranged  and di complete court ordered counseling with no positive outcomes noted  She reports that she feels like her estranged  is hacking into life and doing things to make her feel unsettled            Family History   Problem Relation Age of Onset    Hypertension Mother     Coronary artery disease Father     Heart failure Father     Heart attack Father      Social History     Socioeconomic History    Marital status: /Civil Union     Spouse name: Not on file    Number of children: Not on file    Years of education: Not on file    Highest education level: Not on file   Occupational History    Not on file   Tobacco Use    Smoking status: Current Every Day Smoker     Packs/day: 0 50     Years: 30 00     Pack years: 15 00     Types: Cigarettes    Smokeless tobacco: Never Used   Vaping Use    Vaping Use: Never used   Substance and Sexual Activity    Alcohol use: Not Currently    Drug use: Never    Sexual activity: Not Currently     Partners: Male     Birth control/protection: None   Other Topics Concern    Not on file   Social History Narrative    Caffeine use    Daily coffee consumption     Social Determinants of Health     Financial Resource Strain: Not on file   Food Insecurity: Not on file   Transportation Needs: Not on file   Physical Activity: Not on file   Stress: Not on file   Social Connections: Not on file   Intimate Partner Violence: Not on file   Housing Stability: Not on file     E-Cigarette/Vaping    E-Cigarette Use Never User      E-Cigarette/Vaping Substances    Nicotine No     THC No     CBD No     Flavoring No     Other No     Unknown No      Past Medical History:   Diagnosis Date    Abnormal uterine bleeding (AUB) 1/15/2020    Added automatically from request for surgery [id number]    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    Clotting disorder (HCC)     Depression     Fibroid     GERD (gastroesophageal reflux disease)     Palpitations     S/P laparoscopy 5/11/2020    Status post myomectomy 5/11/2020    Status post vaginal hysterectomy 5/11/2020    Submucous leiomyoma of uterus 5/11/2020     Past Surgical History:   Procedure Laterality Date    EGD      PA CYSTOURETHROSCOPY N/A 5/11/2020    Procedure: CYSTOSCOPY;  Surgeon: Ceola Closs, MD;  [facility];  Service: Gynecology    PA EXCIS UTERINE FIBROID, [address] N/A 5/11/2020    Procedure: MYOMECTOMY WITH HYSTEROSCOPY;  Surgeon: Ceola Closs, MD;  [facility];  Service: Gynecology    PA LAP,DIAGNOSTIC ABDOMEN N/A 5/11/2020    Procedure: LAPAROSCOPY DIAGNOSTIC;  Surgeon: Ceola Closs, MD;  [facility];  Service: Gynecology    PA VAG HYST,RMV TUBE/OVARY N/A 5/11/2020    Procedure: TOTAL VAGINAL HYSTERECTOMY WITH BILATERAL SALPINGECTOMY;  Surgeon: Ceola Closs, MD;  [facility];  Service: Gynecology    WISDOM TOOTH EXTRACTION       Allergies   Allergen Reactions    Duloxetine Hcl Hives    Eggs Or Egg-Derived Products - Food Allergy Abdominal Pain    Zolpidem Confusion       Current Outpatient Medications:     ALPRAZolam (XANAX) 0 5 mg tablet, Take 1 tablet (0 5 mg total) by mouth as needed in the morning and 1 tablet (0 5 mg total) as needed in the evening for anxiety  , Disp: 60 tablet, Rfl: 0    aspirin 325 mg tablet, Take 325 mg by mouth daily, Disp: , Rfl:     cetirizine (ZyrTEC) 10 mg tablet, Take 1 tablet (10 mg total) by mouth daily, Disp: 90 tablet, Rfl: 0    famotidine (PEPCID) 40 MG tablet, TAKE 1 TABLET(40 MG) BY MOUTH DAILY EARLY MORNING, Disp: 90 tablet, Rfl: 0    NIFEdipine (PROCARDIA XL) 60 mg 24 hr tablet, TAKE 1 TABLET(60 MG) BY MOUTH DAILY, Disp: 90 tablet, Rfl: 0    temazepam (RESTORIL) 30 mg capsule, TAKE 2 CAPSULES BY MOUTH AT BEDTIME, Disp: 60 capsule, Rfl: 0    Review of Systems   Constitutional: Negative  Respiratory: Negative  Cardiovascular: Negative  Gastrointestinal: Negative  Musculoskeletal: Positive for myalgias  Neurological: Negative  Psychiatric/Behavioral: Positive for dysphoric mood and sleep disturbance  The patient is nervous/anxious and is hyperactive  Objective:    /94   Pulse 83   Temp 97 5 °F (36 4 °C)   Resp 18   Ht 5' 7" (1 702 m)   Wt 68 kg (150 lb)   LMP 04/26/2020   SpO2 100%   BMI 23 49 kg/m² (Reviewed)     Physical Exam  Vitals reviewed  Constitutional:       General: She is not in acute distress  Appearance: She is well-developed and well-groomed  She is not ill-appearing  HENT:      Head: Normocephalic and atraumatic  Right Ear: External ear normal       Left Ear: External ear normal    Eyes:      General: Lids are normal       Extraocular Movements: Extraocular movements intact  Conjunctiva/sclera: Conjunctivae normal       Pupils: Pupils are equal, round, and reactive to light  Neck:      Thyroid: No thyromegaly or thyroid tenderness  Trachea: Trachea and phonation normal    Cardiovascular:      Rate and Rhythm: Normal rate and regular rhythm  Heart sounds: Normal heart sounds  Pulmonary:      Effort: Pulmonary effort is normal       Breath sounds: Normal breath sounds  Skin:     General: Skin is warm and dry  Neurological:      General: No focal deficit present  Mental Status: She is alert and oriented to person, place, and time     Psychiatric:         Mood and Affect: Mood is anxious and depressed  Affect is tearful  Speech: Speech normal          Behavior: Behavior is agitated ( at times related to life situations)  Behavior is cooperative  Thought Content:  Thought content normal          Cognition and Memory: Cognition normal          Judgment: Judgment normal

## 2022-06-19 DIAGNOSIS — F51.01 PRIMARY INSOMNIA: ICD-10-CM

## 2022-06-19 DIAGNOSIS — F41.0 PANIC ATTACK: ICD-10-CM

## 2022-06-19 DIAGNOSIS — L50.9 URTICARIA: ICD-10-CM

## 2022-06-20 RX ORDER — CETIRIZINE HYDROCHLORIDE 10 MG/1
10 TABLET ORAL DAILY
Qty: 90 TABLET | Refills: 0 | Status: SHIPPED | OUTPATIENT
Start: 2022-06-20

## 2022-06-20 RX ORDER — ALPRAZOLAM 0.5 MG/1
0.5 TABLET ORAL 2 TIMES DAILY PRN
Qty: 60 TABLET | Refills: 0 | Status: SHIPPED | OUTPATIENT
Start: 2022-06-20 | End: 2022-07-22 | Stop reason: SDUPTHER

## 2022-06-20 RX ORDER — TEMAZEPAM 30 MG/1
CAPSULE ORAL
Qty: 60 CAPSULE | Refills: 0 | Status: SHIPPED | OUTPATIENT
Start: 2022-06-20 | End: 2022-07-22 | Stop reason: SDUPTHER

## 2022-06-20 NOTE — TELEPHONE ENCOUNTER
1  [id number] 05/20/2022 05/20/2022 ALPRAZolam (Tablet)  60 0 30 0 5 MG NA [employer]  [employer] 0 / 0 [place]    1  [id number] 05/20/2022 05/20/2022 Temazepam (Capsule)  60 0 30 30 MG NA [employer]  [employer] 0 / 0 [place]    1  [id number] 04/14/2022 04/14/2022 ALPRAZolam (Tablet)  60 0 30 0 5 MG NA [employer]    [employer] 0 / 0 [place]

## 2022-06-27 ENCOUNTER — PATIENT OUTREACH (OUTPATIENT)
Dept: FAMILY MEDICINE CLINIC | Facility: CLINIC | Age: 52
End: 2022-06-27

## 2022-06-27 NOTE — PROGRESS NOTES
Chart reviewed  Patient attended PCP appt on 6/15      Call placed to patient to check status and inform of OREN Ribeiro's outreach attempt to schedule therapy/psychiatry appts  Spoke with patient who states PCP appt went well and she would still like to schedule appt at [facility]  Provided phone # again  Patient will call to schedule appt for herself and she plans to also ask if her children can establish services at this office as well  Patient states her dtr wants to return to public school next year but would prefer [place] rather than [facility]  Encouraged her to outreach [place] to inquire about steps to request this  Will follow

## 2022-06-29 ENCOUNTER — PATIENT OUTREACH (OUTPATIENT)
Dept: FAMILY MEDICINE CLINIC | Facility: CLINIC | Age: 52
End: 2022-06-29

## 2022-06-29 NOTE — PROGRESS NOTES
Message received from patient with request for OP SW to return call and assist in finding psychiatrist that is in-network and can complete comprehensive psych evaluation (requested by the court)  Patient was informed by Nathanael Ferguson that they are not able to complete this evaluation and [facility] stated she would have to pay out of pocket for the eval     Spoke with patient who confirmed the above information  Patient upset because counselor from [facility] was not at recent hearing to support her children  She is unsure why  Encouraged her to call [facility] to inquire about this  Patient states her children are scheduled with [facility] to see therapists but there is a 1-2 month waitlist   She would like to see PCP and Mary Juárez PCP office therapist in the meantime  [employer] message sent to PCP office staff requesting assistance in scheduling patient and her children with PCP & Mary Juárez  Patient does not feel psych eval is needed  Encouraged her to discuss this directly with the court system since they are recommending this  Encouraged her to call her insurance to receive in-network psychiatry list in the meantime and also provided list of psychiatrists from [facility]  Patient will look into these options  Will follow

## 2022-07-13 ENCOUNTER — PATIENT OUTREACH (OUTPATIENT)
Dept: FAMILY MEDICINE CLINIC | Facility: CLINIC | Age: 52
End: 2022-07-13

## 2022-07-13 NOTE — PROGRESS NOTES
Call placed to patient to check status, ask if she was able to obtain answers as to why the reunification counselor did not attend recent hearing, ensure she was able to schedule follow-up appt with PCP office therapist (no appt scheduled at this time), and ask if she reviewed list of psychiatrists that are in-network with her insurance in order to complete comprehensive psych evaluation (being requested by the court)  No answer, voicemail left, and awaiting return call

## 2022-07-22 ENCOUNTER — PATIENT OUTREACH (OUTPATIENT)
Dept: FAMILY MEDICINE CLINIC | Facility: CLINIC | Age: 52
End: 2022-07-22

## 2022-07-22 DIAGNOSIS — F41.0 PANIC ATTACK: ICD-10-CM

## 2022-07-22 DIAGNOSIS — F51.01 PRIMARY INSOMNIA: ICD-10-CM

## 2022-07-22 RX ORDER — TEMAZEPAM 30 MG/1
CAPSULE ORAL
Qty: 60 CAPSULE | Refills: 0 | Status: SHIPPED | OUTPATIENT
Start: 2022-07-22 | End: 2022-08-22 | Stop reason: SDUPTHER

## 2022-07-22 RX ORDER — ALPRAZOLAM 0.5 MG/1
0.5 TABLET ORAL 2 TIMES DAILY PRN
Qty: 60 TABLET | Refills: 0 | Status: SHIPPED | OUTPATIENT
Start: 2022-07-22 | End: 2022-08-22 | Stop reason: SDUPTHER

## 2022-07-22 NOTE — PROGRESS NOTES
2nd outreach attempt to patient to check status, ask if she was able to obtain answers as to why the reunification counselor did not attend recent hearing, ensure she was able to schedule follow-up appt with PCP office therapist (no appt scheduled at this time), and ask if she reviewed list of psychiatrists that are in-network with her insurance in order to complete comprehensive psych evaluation (being requested by the court)      No answer, voicemail left, and awaiting return call

## 2022-07-22 NOTE — TELEPHONE ENCOUNTER
[id number] 06/20/2022 06/20/2022 Temazepam (Capsule) 60 0 30 30 MG NA [facility] [employer]  [employer] 0 / 0 [place]     1 [id number] 06/20/2022 06/20/2022 ALPRAZolam (Tablet) 60 0 30 0 5 MG NA [employer] , [employer]  [employer] 0 / 0 [place]    1 [id number] 05/20/2022 05/20/2022 ALPRAZolam (Tablet) 60 0 30 0 5 MG NA [employer] [facility] [employer]  [employer] 0 / 0 [place]    1 [id number] 05/20/2022 05/20/2022 Temazepam (Capsule) 60 0 30 30 MG NA [employer] , [employer]  [employer] 0 / 0 [place]    1 [id number] 04/14/2022 04/14/2022 ALPRAZolam (Tablet) 60 0 30 0 5 MG [facility] [employer]  [employer] 0 / 0 [place]    1 [id number] 04/14/2022 04/14/2022 Temazepam (Capsule) 60 0 30 30 MG [facility] [employer]   [employer] 0 / 0 [place]    1 16

## 2022-07-22 NOTE — PROGRESS NOTES
Return call received from patient  Patient tried to call a few places to schedule psych evaluation but has not been able to do this; states she is not going to stress about it right now as the reunification counselor is no longer working in the office and has not been attending to court hearings  Patient's children will be starting counseling services with [facility]; she is awaiting call once they are up on waitlist to schedule first appt  Patient would still like to schedule medical follow-up appt for herself and her son with PCP; she spoke with the office and was informed PCP is out of the office at this time  They will discuss availability with PCP for these requested appts and return call to patient once more information is known  Patient would also like to schedule therapy appt with Edson Arevalo for herself and her 2 children together  [facility] message sent to PCP office staff requesting assistance in scheduling therapy appt  Will follow

## 2022-07-25 ENCOUNTER — TELEPHONE (OUTPATIENT)
Dept: FAMILY MEDICINE CLINIC | Facility: CLINIC | Age: 52
End: 2022-07-25

## 2022-07-25 NOTE — TELEPHONE ENCOUNTER
----- Message from Sharif King sent at 7/22/2022  1:37 PM EDT -----  Office staff,  Please call patient to schedule in-person appt with Dorys Desai for herself and her 2 children that also see Cheri Short  Thank you    Rachel Rivera

## 2022-08-11 ENCOUNTER — PATIENT OUTREACH (OUTPATIENT)
Dept: FAMILY MEDICINE CLINIC | Facility: CLINIC | Age: 52
End: 2022-08-11

## 2022-08-11 NOTE — TELEPHONE ENCOUNTER
Patient called the office to make appointment with Telma Calderón for her and her two children  She states that she can only bring them late in the afternoon after 3PM      May we make appointment for later in the day  Usual new patient slots are only 8 AM and 1 PM      Please advise

## 2022-08-11 NOTE — PROGRESS NOTES
Spoke with patient to check status  Patient states she is not doing great  Her uncle recently passed away; provided support to patient  Chart review shows that PCP office left voicemail for patient on 7/25 to schedule appt for herself and her children with Phineas Sames  Patient did not receive a call  She would still like to schedule with Phineas Juliennes and PCP for medical follow-up  Patient to call office to discuss schedules for PCP and Stephon Howard & to inform of medical follow-up needs regarding PCP appt  Patient is also in the process of transferring her dtr from [facility] to [facility]  Reina Libman is aware and in agreement with this  Encouraged patient to do this as soon as possible with school year approaching  She plans to call [facility] today to discuss next steps  Will follow

## 2022-08-22 DIAGNOSIS — F51.01 PRIMARY INSOMNIA: ICD-10-CM

## 2022-08-22 DIAGNOSIS — F41.0 PANIC ATTACK: ICD-10-CM

## 2022-08-22 RX ORDER — TEMAZEPAM 30 MG/1
CAPSULE ORAL
Qty: 60 CAPSULE | Refills: 0 | Status: SHIPPED | OUTPATIENT
Start: 2022-08-22 | End: 2022-09-22 | Stop reason: SDUPTHER

## 2022-08-22 RX ORDER — ALPRAZOLAM 0.5 MG/1
0.5 TABLET ORAL 2 TIMES DAILY PRN
Qty: 60 TABLET | Refills: 0 | Status: SHIPPED | OUTPATIENT
Start: 2022-08-22 | End: 2022-09-22 | Stop reason: SDUPTHER

## 2022-08-22 NOTE — TELEPHONE ENCOUNTER
1  [id number] 07/22/2022 07/22/2022 Temazepam (Capsule)  60 0 30 30 MG NA [employer] [employer]  [employer] 0 / 0 [place]    1  [id number] 07/22/2022 07/22/2022 ALPRAZolam (Tablet)  60 0 30 0 5 MG NA [employer]  [employer] 0 / 0 [place]    1  [id number] 06/20/2022 06/20/2022 Temazepam (Capsule)  60 0 30 30 MG NA AUNG GUZMAN  [employer]    [employer] 0 / 0 [place]

## 2022-08-26 ENCOUNTER — PATIENT OUTREACH (OUTPATIENT)
Dept: FAMILY MEDICINE CLINIC | Facility: CLINIC | Age: 52
End: 2022-08-26

## 2022-08-26 NOTE — PROGRESS NOTES
Call placed to patient to check status, follow-up on upcoming appts, and ask if she was able to complete process of transferring her dtr from [facility] to [facility]  No answer, voicemail left, and awaiting return call

## 2022-08-29 ENCOUNTER — PATIENT OUTREACH (OUTPATIENT)
Dept: FAMILY MEDICINE CLINIC | Facility: CLINIC | Age: 52
End: 2022-08-29

## 2022-08-29 NOTE — PROGRESS NOTES
Call received from patient  Patient was emotional while on the phone because her dtr was denied for the transfer request from [facility] to [place] [facility]  Provided support  Encouraged patient to call [place] to discuss why transfer request was denied  Patient also relayed this information to her  as the  previously stated her dtr would need to start in-person school again, rather than virtual   She is awaiting response from her   Patient does not want her dtr returning to [facility] due to the bullying she experienced  Encouraged her again to call [place] to better understand denial     Patient would still like to schedule therapy session with Mirna Ernst through PCP office but has been awaiting return call from office to schedule this  Spoke with Mikaela Lantigua at PCP office who discussed with Mirna Lantigua states Mirna Ernst is looking into other options for patient  Requested Mirna Ernst outreach patient to discuss this directly  Will route to Mirna Ernst

## 2022-09-02 ENCOUNTER — PATIENT OUTREACH (OUTPATIENT)
Dept: FAMILY MEDICINE CLINIC | Facility: CLINIC | Age: 52
End: 2022-09-02

## 2022-09-02 NOTE — PROGRESS NOTES
Message received from PCP office therapist Shelly Ferris stating he does not have availability until November and is recommending family-based services for patient at this time  Call placed to [facility] to ensure they are still in-network with patient's insurance  Patient's children are also on waitlist at [facility]  Voicemail left and awaiting return call  Call placed to patient to discuss the above information  No answer, voicemail left, and awaiting return call

## 2022-09-02 NOTE — PROGRESS NOTES
Return call received from patient  She is on waitlist for [facility], in additional to her children as well  She will call next week to check status of where she is at on waitlist   OP SWCM will ask if return call is received from [facility] as well  Patient has conference coming up on Tuesday 9/6 at [address] at [address] regarding divorce  She is nervous about this  Provided support  Patient's friend was going to transport but is now unable to  Patient is able to drive herself but will now consider taking back route rather than highways  Encouraged patient to go route she feels most comfortable driving  Alternate option for transport is [facility] or [facility]; discussed with patient  She will consider this  Will follow

## 2022-09-06 DIAGNOSIS — K21.9 GASTROESOPHAGEAL REFLUX DISEASE: ICD-10-CM

## 2022-09-06 DIAGNOSIS — I75.022 BLUE TOE SYNDROME OF LEFT LOWER EXTREMITY (HCC): ICD-10-CM

## 2022-09-06 RX ORDER — FAMOTIDINE 40 MG/1
TABLET, FILM COATED ORAL
Qty: 90 TABLET | Refills: 0 | Status: SHIPPED | OUTPATIENT
Start: 2022-09-06

## 2022-09-06 RX ORDER — NIFEDIPINE 60 MG/1
TABLET, EXTENDED RELEASE ORAL
Qty: 90 TABLET | Refills: 0 | Status: SHIPPED | OUTPATIENT
Start: 2022-09-06

## 2022-09-09 ENCOUNTER — PATIENT OUTREACH (OUTPATIENT)
Dept: FAMILY MEDICINE CLINIC | Facility: CLINIC | Age: 52
End: 2022-09-09

## 2022-09-09 NOTE — PROGRESS NOTES
Call placed to patient to check status and see how the conference went on 9/6 at the [facility] regarding her divorce  No answer, voicemail left, and awaiting return call

## 2022-09-16 ENCOUNTER — PATIENT OUTREACH (OUTPATIENT)
Dept: FAMILY MEDICINE CLINIC | Facility: CLINIC | Age: 52
End: 2022-09-16

## 2022-09-16 NOTE — PROGRESS NOTES
2nd outreach attempt to patient to check status and see how the conference went on 9/6 at the [facility] regarding her divorce  patient states conference went well, she felt very comfortable, she was able to communicate well with her ex-'s new , next conference date is 11/1, and they will continue to discuss distributing equity & plan for the house/property  Patient is representing herself at this time and feels comfortable doing this; she will outreach an  if needed  She was able to drive there successfully and feels comfortable to do this again  Patient stated her dtr is not ready to return to school at this time  Patient has not received update from [facility] regarding waitlist for herself and her children to start therapy services  OP [facility] outreached [facility]; they stated patient was placed on list due to 2 unsuccessful outreaches  They will call patient again; informed patient to await call from [facility] today  Provided email to patient with [facility] phone # if she does not receive call from them by the end of the day; encouraged her to call them directly if so  Will follow

## 2022-09-22 DIAGNOSIS — F51.01 PRIMARY INSOMNIA: ICD-10-CM

## 2022-09-22 DIAGNOSIS — F41.0 PANIC ATTACK: ICD-10-CM

## 2022-09-23 RX ORDER — ALPRAZOLAM 0.5 MG/1
0.5 TABLET ORAL 2 TIMES DAILY PRN
Qty: 60 TABLET | Refills: 0 | Status: SHIPPED | OUTPATIENT
Start: 2022-09-23

## 2022-09-23 RX ORDER — TEMAZEPAM 30 MG/1
CAPSULE ORAL
Qty: 60 CAPSULE | Refills: 0 | Status: SHIPPED | OUTPATIENT
Start: 2022-09-23

## 2022-09-23 NOTE — TELEPHONE ENCOUNTER
08/22/2022 08/22/2022 Temazepam (Capsule)  60 0 30 30 MG NA 30 [address] , [facility]  0 / 0 [place]    1  [id number] 08/22/2022 08/22/2022 ALPRAZolam (Tablet)  60 0 30 0 5 MG NA [facility]

## 2022-09-30 ENCOUNTER — PATIENT OUTREACH (OUTPATIENT)
Dept: FAMILY MEDICINE CLINIC | Facility: CLINIC | Age: 52
End: 2022-09-30

## 2022-09-30 NOTE — PROGRESS NOTES
Call placed to patient to check status and ask if she was able to receive update from [facility] regarding scheduling therapy appts for herself and her children  No answer, voicemail left, and awaiting return call

## 2022-10-07 ENCOUNTER — PATIENT OUTREACH (OUTPATIENT)
Dept: FAMILY MEDICINE CLINIC | Facility: CLINIC | Age: 52
End: 2022-10-07

## 2022-10-07 NOTE — PROGRESS NOTES
2nd outreach attempt to patient to check status and ask if she was able to receive update from [facility] regarding scheduling therapy appts for herself and her children  No answer, voicemail left, and awaiting return call

## 2022-10-11 DIAGNOSIS — L50.9 URTICARIA: ICD-10-CM

## 2022-10-11 RX ORDER — CETIRIZINE HYDROCHLORIDE 10 MG/1
10 TABLET ORAL DAILY
Qty: 90 TABLET | Refills: 0 | Status: SHIPPED | OUTPATIENT
Start: 2022-10-11

## 2022-10-18 ENCOUNTER — PATIENT OUTREACH (OUTPATIENT)
Dept: FAMILY MEDICINE CLINIC | Facility: CLINIC | Age: 52
End: 2022-10-18

## 2022-10-18 NOTE — PROGRESS NOTES
3rd outreach attempt to patient to check status and ask if she was able to receive update from [facility] regarding scheduling therapy appts for herself and her children   No answer, voicemail left, and awaiting return call

## 2022-10-31 DIAGNOSIS — F41.0 PANIC ATTACK: ICD-10-CM

## 2022-10-31 DIAGNOSIS — F51.01 PRIMARY INSOMNIA: ICD-10-CM

## 2022-11-01 ENCOUNTER — PATIENT OUTREACH (OUTPATIENT)
Dept: FAMILY MEDICINE CLINIC | Facility: CLINIC | Age: 52
End: 2022-11-01

## 2022-11-01 DIAGNOSIS — Z78.9 NEEDS ASSISTANCE WITH COMMUNITY RESOURCES: Primary | ICD-10-CM

## 2022-11-01 NOTE — PROGRESS NOTES
Additional outreach attempt to patient to check status and ask if she was able to receive update from OREN Ribeiro regarding scheduling therapy appts for herself and her children  Spoke with patient who states she had therapy appt at [facility] and therapist stated she could not help patient at this time as issues are still ongoing  Therapist recommended patient call [facility] for support at this time  Encouraged patient to do so and also encouraged patient to outreach OREN Ribeiro again in the future  Patient states she has been going through a lot lately and has another court appearance scheduled for today to continue with divorce process  Patient states her ex- is saying he did not take their children's birth certificates and social security cards  Patient cannot find these either  Ancil Rater started the process to request these documents in the past but patient then asked to put in this task on hold in order to focus on other needs at the time  Patient is asking for help again to apply for new documents for her children  Will refer to [facility] for assistance with this  Patient also states her son (also patient at PCP office) who recently turned 25years old is not ready to join the workforce upon graduating; patient states he is highly intelligent and has been diagnosed with high functioning autism  She would like assistance to help him apply for Social Security  Will relay to [facility] as well  Patient plans to call PCP office to schedule follow-up appt related to rash that has not improved  Patient is aware OP SWCM will be going on extended leave next month  Due to this, case will be handed off to [id number] [address] [place] [address] and [facility]  OP SWCM will remain available to assist with any needs until leave occurs  Update provided to [facility] as well

## 2022-11-02 RX ORDER — ALPRAZOLAM 0.5 MG/1
0.5 TABLET ORAL 2 TIMES DAILY PRN
Qty: 60 TABLET | Refills: 0 | Status: SHIPPED | OUTPATIENT
Start: 2022-11-02

## 2022-11-02 RX ORDER — TEMAZEPAM 30 MG/1
CAPSULE ORAL
Qty: 60 CAPSULE | Refills: 0 | Status: SHIPPED | OUTPATIENT
Start: 2022-11-02

## 2022-11-11 ENCOUNTER — PATIENT OUTREACH (OUTPATIENT)
Dept: CASE MANAGEMENT | Facility: OTHER | Age: 52
End: 2022-11-11

## 2022-11-11 NOTE — PROGRESS NOTES
Arnaud Barreto received referral from [address] to assist patient with her childrens Birth Certificates and social security cards  Arnaud Barreto will also help to scheduled intake apt at social security office to apply for disability for son  1st Outreach     Chart Review

## 2022-11-15 ENCOUNTER — PATIENT OUTREACH (OUTPATIENT)
Dept: FAMILY MEDICINE CLINIC | Facility: CLINIC | Age: 52
End: 2022-11-15

## 2022-11-15 NOTE — PROGRESS NOTES
Call placed to patient to check status  Patient states she will be going to court on Thursday to make final decision about the distribution of the property  Patient shared they will be losing the house, which she and her children are upset about  Provided support  Patient states she has a plan as to where she and her children will move to but she did not want to disclose this over the phone  Encouraged patient to inform [facility] team if she needs assistance with additional housing/landlord resources  Patient plans to outreach [facility] after Thursday to discuss available supports for herself and her children per [facility] therapist recommendations  Patient has not outreached this resource yet  Patient states her rash flares at times (behind ears, goes down her back, and spreads everywhere per patient)  She plans to call PCP office to schedule appt for PCP to assess rash  Patient would like to meet with [facility] in-person at PCP office to discuss needed applications/documents for her children  Patient plans to message [facility] next week to schedule a time to meet; patient wants to get through Thursday's court appearance before focusing on these tasks  OP SWCM informed [facility] who will await message from patient  Patient is aware OP SWCM will be going out on an extended leave  [facility] and OP SWCM Martha Steven will continue to assist patient and follow case

## 2022-11-22 DIAGNOSIS — J06.9 ACUTE URI: Primary | ICD-10-CM

## 2022-11-22 RX ORDER — ALBUTEROL SULFATE 90 UG/1
2 AEROSOL, METERED RESPIRATORY (INHALATION) EVERY 6 HOURS PRN
Qty: 6.7 G | Refills: 3 | Status: SHIPPED | OUTPATIENT
Start: 2022-11-22

## 2022-11-22 RX ORDER — AMOXICILLIN AND CLAVULANATE POTASSIUM 875; 125 MG/1; MG/1
1 TABLET, FILM COATED ORAL EVERY 12 HOURS SCHEDULED
Qty: 14 TABLET | Refills: 0 | Status: SHIPPED | OUTPATIENT
Start: 2022-11-22 | End: 2022-11-29

## 2022-12-01 ENCOUNTER — PATIENT OUTREACH (OUTPATIENT)
Dept: CASE MANAGEMENT | Facility: OTHER | Age: 52
End: 2022-12-01

## 2022-12-01 DIAGNOSIS — M54.50 ACUTE LOW BACK PAIN, UNSPECIFIED BACK PAIN LATERALITY, UNSPECIFIED WHETHER SCIATICA PRESENT: Primary | ICD-10-CM

## 2022-12-01 RX ORDER — OXYCODONE AND ACETAMINOPHEN 7.5; 325 MG/1; MG/1
1 TABLET ORAL EVERY 8 HOURS PRN
Qty: 10 TABLET | Refills: 0 | Status: SHIPPED | OUTPATIENT
Start: 2022-12-01 | End: 2023-09-06 | Stop reason: ALTCHOICE

## 2022-12-01 NOTE — PROGRESS NOTES
CMOC spoke to patient today to assist her with Rei's and social security cards  Pt states this week is not a good time as she is not feeling well  Pt states she had a court hearing on Nov 17,2022 for her   Pt wants to meet with me at the office in [facility]  CMOC stated that we can help fund the Birth Certificate for her children through our [employer]  Pt also want help with applying for Social security for her son  Pt states she will call me when she is ready to meet with me

## 2022-12-06 DIAGNOSIS — I75.022 BLUE TOE SYNDROME OF LEFT LOWER EXTREMITY (HCC): ICD-10-CM

## 2022-12-06 DIAGNOSIS — K21.9 GASTROESOPHAGEAL REFLUX DISEASE: ICD-10-CM

## 2022-12-06 RX ORDER — NIFEDIPINE 60 MG/1
TABLET, EXTENDED RELEASE ORAL
Qty: 90 TABLET | Refills: 0 | Status: SHIPPED | OUTPATIENT
Start: 2022-12-06

## 2022-12-06 RX ORDER — FAMOTIDINE 40 MG/1
TABLET, FILM COATED ORAL
Qty: 90 TABLET | Refills: 0 | Status: SHIPPED | OUTPATIENT
Start: 2022-12-06

## 2022-12-08 ENCOUNTER — TELEPHONE (OUTPATIENT)
Dept: FAMILY MEDICINE CLINIC | Facility: CLINIC | Age: 52
End: 2022-12-08

## 2022-12-08 NOTE — TELEPHONE ENCOUNTER
Patient called stating that she still has head congestion, headaches, 102 temp  She states that the augmentin did not work  She did not take an at home covid test, I asked her if she could take one and call with the results  She is asking if she and her two children can come in for appointments tomorrow   (Please see their charts for encounter)

## 2022-12-09 ENCOUNTER — OFFICE VISIT (OUTPATIENT)
Dept: FAMILY MEDICINE CLINIC | Facility: CLINIC | Age: 52
End: 2022-12-09

## 2022-12-09 VITALS
WEIGHT: 147 LBS | OXYGEN SATURATION: 100 % | SYSTOLIC BLOOD PRESSURE: 126 MMHG | TEMPERATURE: 97.8 F | BODY MASS INDEX: 23.07 KG/M2 | DIASTOLIC BLOOD PRESSURE: 80 MMHG | HEART RATE: 62 BPM | RESPIRATION RATE: 16 BRPM | HEIGHT: 67 IN

## 2022-12-09 DIAGNOSIS — H69.83 DYSFUNCTION OF BOTH EUSTACHIAN TUBES: Primary | ICD-10-CM

## 2022-12-09 RX ORDER — METHYLPREDNISOLONE 4 MG/1
TABLET ORAL
Qty: 21 EACH | Refills: 0 | Status: SHIPPED | OUTPATIENT
Start: 2022-12-09

## 2022-12-09 RX ORDER — CEFUROXIME AXETIL 500 MG/1
500 TABLET ORAL EVERY 12 HOURS SCHEDULED
Qty: 14 TABLET | Refills: 0 | Status: SHIPPED | OUTPATIENT
Start: 2022-12-09 | End: 2022-12-16

## 2022-12-09 NOTE — PROGRESS NOTES
Name: Elder Soriano      : 1970      MRN: [id number]  Encounter Provider: JOSE Seaman  Encounter Date: 2022   Encounter department: [address] [place] [address]     1  Dysfunction of both eustachian tubes  -     cefuroxime (CEFTIN) 500 mg tablet; Take 1 tablet (500 mg total) by mouth every 12 (twelve) hours for 7 days  -     methylPREDNISolone 4 MG tablet therapy pack; Use as directed on package  Discussed with patient plan to treat with 7 day course of cefuroxime along with a Medrol dose pack for inflammation  Patient instructed to call if no improvement in 72 hours or symptoms worsen       Subjective      52 y  o female presenting with nasal congestion and headache for the past week  She has tried different over the counter medications without relief of congestion  Patient reports that she has been unable to get rid of this ill feeling since prior to   Patient is under a lot of stress related to her ongoing divorce proceedings  Patient reports that she needs to vacate her house by 2023 for the divorce to be finalized  Patient expressing a lot of emotions related to things occurring in her and her children's lives  Review of Systems   Constitutional: Positive for fatigue  Negative for chills and fever  HENT: Positive for congestion, ear pain, sinus pressure and sinus pain  Negative for postnasal drip and rhinorrhea  Respiratory: Negative for cough, chest tightness, shortness of breath and wheezing  Cardiovascular: Negative for chest pain and palpitations  Gastrointestinal: Negative for abdominal distention, abdominal pain, diarrhea and nausea  Musculoskeletal: Negative for myalgias  Neurological: Positive for headaches  Negative for dizziness and light-headedness  Psychiatric/Behavioral: Negative          Current Outpatient Medications on File Prior to Visit   Medication Sig   • albuterol (ProAir HFA) 90 mcg/act inhaler Inhale 2 puffs every 6 (six) hours as needed for wheezing   • ALPRAZolam (XANAX) 0 5 mg tablet Take 1 tablet (0 5 mg total) by mouth 2 (two) times a day as needed for anxiety   • aspirin 325 mg tablet Take 325 mg by mouth daily   • cetirizine (ZyrTEC) 10 mg tablet Take 1 tablet (10 mg total) by mouth daily   • famotidine (PEPCID) 40 MG tablet TAKE 1 TABLET(40 MG) BY MOUTH DAILY EARLY MORNING   • NIFEdipine (PROCARDIA XL) 60 mg 24 hr tablet TAKE 1 TABLET(60 MG) BY MOUTH DAILY   • oxyCODONE-acetaminophen (Percocet) 7 5-325 MG per tablet Take 1 tablet by mouth every 8 (eight) hours as needed for moderate pain Max Daily Amount: 3 tablets   • temazepam (RESTORIL) 30 mg capsule TAKE 2 CAPSULES BY MOUTH AT BEDTIME       Objective     /80   Pulse 62   Temp 97 8 °F (36 6 °C)   Resp 16   Ht 5' 7" (1 702 m)   Wt 66 7 kg (147 lb)   LMP 04/26/2020   SpO2 100%   BMI 23 02 kg/m² (Reviewed)    Physical Exam  Vitals reviewed  Constitutional:       General: She is not in acute distress  Appearance: She is not ill-appearing  HENT:      Head: Normocephalic and atraumatic  Right Ear: Ear canal and external ear normal  Tympanic membrane is injected and bulging  Left Ear: Ear canal and external ear normal  Tympanic membrane is injected and bulging  Nose: Congestion present  Mouth/Throat:      Mouth: Mucous membranes are moist       Pharynx: Oropharynx is clear  Eyes:      Extraocular Movements: Extraocular movements intact  Conjunctiva/sclera: Conjunctivae normal       Pupils: Pupils are equal, round, and reactive to light  Cardiovascular:      Rate and Rhythm: Normal rate and regular rhythm  Heart sounds: Normal heart sounds  Pulmonary:      Effort: Pulmonary effort is normal       Breath sounds: Normal breath sounds  Skin:     General: Skin is warm and dry  Capillary Refill: Capillary refill takes less than 2 seconds  Neurological:      General: No focal deficit present  Mental Status: She is alert and oriented to person, place, and time  Psychiatric:         Mood and Affect: Mood normal          Behavior: Behavior normal  Behavior is cooperative         [address]

## 2022-12-15 ENCOUNTER — PATIENT OUTREACH (OUTPATIENT)
Dept: CASE MANAGEMENT | Facility: OTHER | Age: 52
End: 2022-12-15

## 2022-12-15 DIAGNOSIS — L50.9 URTICARIA: ICD-10-CM

## 2022-12-15 DIAGNOSIS — F41.0 PANIC ATTACK: ICD-10-CM

## 2022-12-15 DIAGNOSIS — F51.01 PRIMARY INSOMNIA: ICD-10-CM

## 2022-12-15 NOTE — PROGRESS NOTES
CMOC left message for patient to call me back so I can help her with the children Birth certificate and social security cards

## 2022-12-16 RX ORDER — CETIRIZINE HYDROCHLORIDE 10 MG/1
10 TABLET ORAL DAILY
Qty: 90 TABLET | Refills: 0 | Status: SHIPPED | OUTPATIENT
Start: 2022-12-16

## 2022-12-16 RX ORDER — ALPRAZOLAM 0.5 MG/1
0.5 TABLET ORAL 2 TIMES DAILY PRN
Qty: 60 TABLET | Refills: 0 | Status: SHIPPED | OUTPATIENT
Start: 2022-12-16

## 2022-12-16 RX ORDER — TEMAZEPAM 30 MG/1
CAPSULE ORAL
Qty: 60 CAPSULE | Refills: 0 | Status: SHIPPED | OUTPATIENT
Start: 2022-12-16

## 2022-12-16 NOTE — TELEPHONE ENCOUNTER
[id number] 12/01/2022 12/01/2022 oxyCODONE HCL-ACETAMINOPHEN (Tablet)  10 0 3 325 MG-7 5 MG 37 50 [employer]  [employer] 0 / 0 [place]    1  [id number] 11/02/2022 11/02/2022 ALPRAZolam (Tablet)  60 0 30 0 5 MG NA [employer]  [employer] 0 / 0 [place]    1  [id number] 11/02/2022 11/02/2022 Temazepam (Capsule)  60 0 30 30 MG NA [employer]  [employer] 0 / 0 [place]    1  [id number] 09/23/2022 09/23/2022 ALPRAZolam (Tablet)  60 0 30 0 5 MG NA [employer]    [employer] 0 / 0 [place]

## 2022-12-28 DIAGNOSIS — J06.9 ACUTE URI: Primary | ICD-10-CM

## 2022-12-28 RX ORDER — CIPROFLOXACIN 250 MG/1
250 TABLET, FILM COATED ORAL EVERY 12 HOURS SCHEDULED
Qty: 14 TABLET | Refills: 0 | Status: SHIPPED | OUTPATIENT
Start: 2022-12-28 | End: 2023-01-04

## 2023-01-03 ENCOUNTER — PATIENT OUTREACH (OUTPATIENT)
Dept: CASE MANAGEMENT | Facility: OTHER | Age: 53
End: 2023-01-03

## 2023-01-03 NOTE — PROGRESS NOTES
[facility] will meet patient on January 11, 2023 at 11:am at doctors office [address] to help her with the Birth Certificates and social security cards for her children  [facility] will send e-mail to [facility] to see if they can fund the [employer]

## 2023-01-04 ENCOUNTER — PATIENT OUTREACH (OUTPATIENT)
Dept: FAMILY MEDICINE CLINIC | Facility: CLINIC | Age: 53
End: 2023-01-04

## 2023-01-04 NOTE — PROGRESS NOTES
OPCM YADIRA placed outreach phone call to patient  Patient is asking YADIRA and [facility] to use this phone number [phone] for all contact  Patient reports that she found birth certificates and SS cards in pile of garbage by the [facility] [place]  Patient knows her original birth certificate is there, not sure about the others  Will have [facility] proceed with duplicates until otherwise notified by patient  Patient would like to file for SS for her son, Elvis Callahan  Son has high functioning autism and will be graduating this year  Son is very nervous and is not ready to enter the workforce with the current stressors in his life  Son is not receiving any community resources at this time  Patient reports that in September she had received a 3 day prescription for a medication  Notification of this medication was sent to her ex- by [employer]  YADIRA advised that if a medication is considered a "controlled" substance, notifications about use, side effects, and possible addiction are sent  Patient reports this is not the first medication this has happened with  Insurance is under 's name and likely why he is getting the notifications  Patient encouraged to contact [employer] to request that any notifications or contact for patient's medications be made to patient alone  Patient also reports that she found funds missing for her EBT card  A report shows that funds were used on 12/28/22 in the 12:00am hour  Patient encouraged to contact the [place] to advise that these funds are missing and were not used by patient  Patient reports that the house will be sold through the divorce and home is expected to be listed on 4/1/23  Patient is not sure where she and the children will go after the sale  [id number] to meet with patient next week  Patient asked YADIRA to provide phone number to [facility] and to send a reminder of the date and time       SW to follow up in 2 weeks for status of [employer] and [place] issues

## 2023-01-09 ENCOUNTER — PATIENT OUTREACH (OUTPATIENT)
Dept: CASE MANAGEMENT | Facility: OTHER | Age: 53
End: 2023-01-09

## 2023-01-17 ENCOUNTER — PATIENT OUTREACH (OUTPATIENT)
Dept: CASE MANAGEMENT | Facility: OTHER | Age: 53
End: 2023-01-17

## 2023-01-17 NOTE — PROGRESS NOTES
[address] [place] [address] picked up donated checks from [facility] for [employer]  I will send out the Birth Certificates tomorrow

## 2023-01-19 ENCOUNTER — PATIENT OUTREACH (OUTPATIENT)
Dept: CASE MANAGEMENT | Facility: OTHER | Age: 53
End: 2023-01-19

## 2023-01-19 NOTE — PROGRESS NOTES
[facility] Mailed out patient childrens birth Certificate applications with donated check from [facility]  Copy of birth certificate has been scanned for records  [facility] send supporting documents to the [facility] office for the approval of the [employer] and [place] re-certification      I will continue to f/u with pt and team

## 2023-01-30 ENCOUNTER — PATIENT OUTREACH (OUTPATIENT)
Dept: CASE MANAGEMENT | Facility: OTHER | Age: 53
End: 2023-01-30

## 2023-01-30 NOTE — PROGRESS NOTES
[facility] met with patient to help patient son with intake application with social security office  Pt states her [employer] has been approved  Pt will let me know when she receives birth certificates for children in the mail  CMOC will closed case as the Goals have been completed  Kiran Ocampo

## 2023-01-31 ENCOUNTER — PATIENT OUTREACH (OUTPATIENT)
Dept: FAMILY MEDICINE CLINIC | Facility: CLINIC | Age: 53
End: 2023-01-31

## 2023-01-31 NOTE — PROGRESS NOTES
OPCM SW placed follow up call  Patient reports that she has her ex-'s birth certificate which was found in the house  Patient is unsure how to return it to him  SW suggested certified mail  Patient reports that ex- sent new medical cards by certified mail and patient received the envelope in the mailbox and did not sign  Patient is unsure about this process  YADIRA suggested [employer] or [employer] as patient does not want ex- to come to home  Patient discussed counseling both individually and family group  Insurance provider lists provided  Patient does not identify further needs at this time

## 2023-02-01 ENCOUNTER — OFFICE VISIT (OUTPATIENT)
Dept: FAMILY MEDICINE CLINIC | Facility: CLINIC | Age: 53
End: 2023-02-01

## 2023-02-01 VITALS
RESPIRATION RATE: 16 BRPM | SYSTOLIC BLOOD PRESSURE: 136 MMHG | HEART RATE: 86 BPM | BODY MASS INDEX: 23.23 KG/M2 | HEIGHT: 67 IN | DIASTOLIC BLOOD PRESSURE: 82 MMHG | OXYGEN SATURATION: 97 % | TEMPERATURE: 97.4 F | WEIGHT: 148 LBS

## 2023-02-01 DIAGNOSIS — H61.23 IMPACTED CERUMEN OF BOTH EARS: Primary | ICD-10-CM

## 2023-02-01 DIAGNOSIS — J32.9 CHRONIC SINUSITIS, UNSPECIFIED LOCATION: ICD-10-CM

## 2023-02-01 NOTE — PROGRESS NOTES
Name: Delmy Alcaraz      : 1970      MRN: [id number]  Encounter Provider: JOSE Hughes  Encounter Date: 2023   Encounter department: [address] [place] [address] Road     1  Impacted cerumen of both ears  -     Ear cerumen removal    2  Chronic sinusitis, unspecified location  -     XR sinuses < 3 vw; Future; Expected date: 2023  #1 Impacted cerumen of both ears  Patient's ears were flushed and cerumen removed using loop  #2 Chronic sinusitis  Discussed with patient plan to obtain a x-ray of sinuses to further evalaute  Patient instructed to call if no improvement in 72 hours or symptoms worsen       Subjective      52 y  o female presenting with continued ear pressure and clogged feeling  Patient has been seen on multiple occasions and treated with antibiotics and steroids without improvement  Review of Systems   Constitutional: Negative  HENT: Positive for congestion, ear pain and sinus pressure  Respiratory: Negative  Cardiovascular: Negative  Gastrointestinal: Negative  Musculoskeletal: Negative  Neurological: Negative  Psychiatric/Behavioral: Negative          Current Outpatient Medications on File Prior to Visit   Medication Sig   • albuterol (ProAir HFA) 90 mcg/act inhaler Inhale 2 puffs every 6 (six) hours as needed for wheezing   • ALPRAZolam (XANAX) 0 5 mg tablet Take 1 tablet (0 5 mg total) by mouth 2 (two) times a day as needed for anxiety   • aspirin 325 mg tablet Take 325 mg by mouth daily   • cetirizine (ZyrTEC) 10 mg tablet Take 1 tablet (10 mg total) by mouth daily   • famotidine (PEPCID) 40 MG tablet TAKE 1 TABLET(40 MG) BY MOUTH DAILY EARLY MORNING   • NIFEdipine (PROCARDIA XL) 60 mg 24 hr tablet TAKE 1 TABLET(60 MG) BY MOUTH DAILY   • temazepam (RESTORIL) 30 mg capsule TAKE 2 CAPSULES BY MOUTH AT BEDTIME   • oxyCODONE-acetaminophen (Percocet) 7 5-325 MG per tablet Take 1 tablet by mouth every 8 (eight) hours as needed for moderate pain Max Daily Amount: 3 tablets (Patient not taking: Reported on 2/1/2023)       Objective     /82 (BP Location: Right arm, Patient Position: Sitting, Cuff Size: Adult)   Pulse 86   Temp (!) 97 4 °F (36 3 °C)   Resp 16   Ht 5' 7" (1 702 m)   Wt 67 1 kg (148 lb)   LMP 04/26/2020   SpO2 97%   BMI 23 18 kg/m² (Reviewed)    Physical Exam  Vitals reviewed  Constitutional:       General: She is not in acute distress  Appearance: She is well-developed and well-groomed  She is not ill-appearing  HENT:      Head: Normocephalic and atraumatic  Right Ear: Tympanic membrane, ear canal and external ear normal  There is impacted cerumen  Left Ear: Tympanic membrane, ear canal and external ear normal  There is impacted cerumen  Nose: Congestion present  Eyes:      General: Lids are normal       Extraocular Movements: Extraocular movements intact  Conjunctiva/sclera: Conjunctivae normal       Pupils: Pupils are equal, round, and reactive to light  Neck:      Trachea: Trachea and phonation normal    Cardiovascular:      Rate and Rhythm: Normal rate and regular rhythm  Heart sounds: Normal heart sounds  Pulmonary:      Effort: Pulmonary effort is normal       Breath sounds: Normal breath sounds  Skin:     General: Skin is warm and dry  Capillary Refill: Capillary refill takes less than 2 seconds  Neurological:      General: No focal deficit present  Mental Status: She is alert and oriented to person, place, and time  Psychiatric:         Mood and Affect: Mood normal          Behavior: Behavior normal  Behavior is cooperative  Ear cerumen removal    Date/Time: 2/1/2023 10:05 AM  Performed by: JOSE Mitchell  Authorized by: JOSE Mitchell   Universal Protocol:  Consent: Verbal consent obtained    Risks and benefits: risks, benefits and alternatives were discussed  Consent given by: patient  Timeout called at: 2/1/2023 10:05 AM   Patient understanding: patient states understanding of the procedure being performed  Patient identity confirmed: verbally with patient      Patient location:  Clinic  Indications / Diagnosis:  Impacted cerumen  Procedure details:     Local anesthetic:  None    Location:  L ear and R ear    Procedure type: irrigation with instrumentation      Instrumentation: curette      Visualization (free text):  Otoscope  Post-procedure details:     Complication:  None    Hearing quality:  Improved    Patient tolerance of procedure:   Tolerated well, no immediate complications          JOSE Espinal

## 2023-02-09 DIAGNOSIS — F51.01 PRIMARY INSOMNIA: ICD-10-CM

## 2023-02-09 DIAGNOSIS — F41.0 PANIC ATTACK: ICD-10-CM

## 2023-02-09 NOTE — TELEPHONE ENCOUNTER
[id number] 12/16/2022 12/16/2022 ALPRAZolam (Tablet)  60 0 30 0 5 MG NA [employer]  [employer] 0 / 0 [address]     1  [id number] 12/16/2022 12/16/2022 Temazepam (Capsule)  60 0 30 30 MG NA [employer]  [employer] 0 / 0 [address]    1  [id number] 12/01/2022 12/01/2022 ACETAMINOPHEN 325 MG / oxyCODONE HYDROCHLORIDE 7 5 MG ORAL TABLET (Tablet)  10 0 3 7 5 MG/325 0 MG NA [employer]  [employer] 0 / 0 [address]    1  [id number] 11/02/2022 11/02/2022 ALPRAZolam (Tablet)  60 0 30 0 5 MG NA [employer]    [employer] 0

## 2023-02-10 RX ORDER — ALPRAZOLAM 0.5 MG/1
0.5 TABLET ORAL 2 TIMES DAILY PRN
Qty: 60 TABLET | Refills: 0 | Status: SHIPPED | OUTPATIENT
Start: 2023-02-10

## 2023-02-10 RX ORDER — TEMAZEPAM 30 MG/1
CAPSULE ORAL
Qty: 60 CAPSULE | Refills: 0 | Status: SHIPPED | OUTPATIENT
Start: 2023-02-10

## 2023-02-24 ENCOUNTER — PATIENT OUTREACH (OUTPATIENT)
Dept: FAMILY MEDICINE CLINIC | Facility: CLINIC | Age: 53
End: 2023-02-24

## 2023-02-24 NOTE — PROGRESS NOTES
OPCM YADIRA received TT from [facility] asking SW to reach out to patient to confirm text message that [facility] is unable to meet with patient and son today  SW placed phone call to patient  Patient was not available and message left with this information  SW will follow up in 2 weeks for ongoing needs

## 2023-03-05 DIAGNOSIS — I75.022 BLUE TOE SYNDROME OF LEFT LOWER EXTREMITY (HCC): ICD-10-CM

## 2023-03-05 DIAGNOSIS — K21.9 GASTROESOPHAGEAL REFLUX DISEASE: ICD-10-CM

## 2023-03-06 RX ORDER — NIFEDIPINE 60 MG/1
TABLET, EXTENDED RELEASE ORAL
Qty: 90 TABLET | Refills: 0 | Status: SHIPPED | OUTPATIENT
Start: 2023-03-06

## 2023-03-06 RX ORDER — FAMOTIDINE 40 MG/1
TABLET, FILM COATED ORAL
Qty: 90 TABLET | Refills: 0 | Status: SHIPPED | OUTPATIENT
Start: 2023-03-06

## 2023-03-22 DIAGNOSIS — F51.01 PRIMARY INSOMNIA: ICD-10-CM

## 2023-03-22 DIAGNOSIS — F41.0 PANIC ATTACK: ICD-10-CM

## 2023-03-22 NOTE — TELEPHONE ENCOUNTER
[id number] 02/10/2023  02/10/2023 ALPRAZolam (Tablet)  60 0 30 0 5 MG NA [employer]  [place]     1  [id number] 02/10/2023  02/10/2023 Temazepam (Capsule)  60 0 30 30 MG NA [employer]  [employer] 0 / 0 [place]    1  [id number] 12/16/2022 12/16/2022 ALPRAZolam (Tablet)  60 0 30 0 5 MG NA [employer]  [employer] 0 / 0 [place]    1  [id number] 12/16/2022 12/16/2022 Temazepam (Capsule)  60 0 30 30 MG NA [employer]  [employer] 0 / 0 [place]    1  [id number] 12/01/2022 12/01/2022 ACETAMINOPHEN 325 MG / oxyCODONE HYDROCHLORIDE 7 5 MG ORAL TABLET (Tablet)  10 0 3 7 5 MG/325 0 MG 37 50 [employer]    [employer] 0 / 0

## 2023-03-23 RX ORDER — ALPRAZOLAM 0.5 MG/1
0.5 TABLET ORAL 2 TIMES DAILY PRN
Qty: 60 TABLET | Refills: 0 | Status: SHIPPED | OUTPATIENT
Start: 2023-03-23

## 2023-03-23 RX ORDER — TEMAZEPAM 30 MG/1
CAPSULE ORAL
Qty: 60 CAPSULE | Refills: 0 | Status: SHIPPED | OUTPATIENT
Start: 2023-03-23

## 2023-05-04 ENCOUNTER — PATIENT OUTREACH (OUTPATIENT)
Dept: FAMILY MEDICINE CLINIC | Facility: CLINIC | Age: 53
End: 2023-05-04

## 2023-05-04 NOTE — PROGRESS NOTES
SW placed phone call to patient  Call was dropped and registered as declined  SW will attempt call at another time

## 2023-05-22 DIAGNOSIS — F41.0 PANIC ATTACK: ICD-10-CM

## 2023-05-22 DIAGNOSIS — F51.01 PRIMARY INSOMNIA: ICD-10-CM

## 2023-05-22 RX ORDER — ALPRAZOLAM 0.5 MG/1
0.5 TABLET ORAL 2 TIMES DAILY PRN
Qty: 60 TABLET | Refills: 0 | Status: SHIPPED | OUTPATIENT
Start: 2023-05-22

## 2023-05-22 RX ORDER — TEMAZEPAM 30 MG/1
CAPSULE ORAL
Qty: 60 CAPSULE | Refills: 0 | Status: SHIPPED | OUTPATIENT
Start: 2023-05-22

## 2023-05-22 NOTE — TELEPHONE ENCOUNTER
1  [id number] 04/21/2023 04/21/2023 Temazepam (Capsule)  60 0 30 30 MG NA [employer]  [employer] 0 / 0 [place]    1  [id number] 04/21/2023 04/21/2023 ALPRAZolam (Tablet)  60 0 30 0 5 MG NA [employer]    [place]    1  [id number] 03/24/2023 03/23/2023 Temazepam (Capsule)  50 0 30 30 MG NA [facility]

## 2023-06-03 DIAGNOSIS — K21.9 GASTROESOPHAGEAL REFLUX DISEASE: ICD-10-CM

## 2023-06-05 ENCOUNTER — PATIENT OUTREACH (OUTPATIENT)
Dept: FAMILY MEDICINE CLINIC | Facility: CLINIC | Age: 53
End: 2023-06-05

## 2023-06-05 RX ORDER — FAMOTIDINE 40 MG/1
TABLET, FILM COATED ORAL
Qty: 90 TABLET | Refills: 0 | Status: SHIPPED | OUTPATIENT
Start: 2023-06-05

## 2023-06-05 NOTE — PROGRESS NOTES
YADIRA placed phone call to patient  Patient was not available at time of phone call  YADIRA left message with reason for phone call and contact number  Awaiting return call from patient

## 2023-06-23 DIAGNOSIS — F51.01 PRIMARY INSOMNIA: ICD-10-CM

## 2023-06-23 DIAGNOSIS — F41.0 PANIC ATTACK: ICD-10-CM

## 2023-06-23 DIAGNOSIS — I75.022 BLUE TOE SYNDROME OF LEFT LOWER EXTREMITY (HCC): ICD-10-CM

## 2023-06-23 RX ORDER — ALPRAZOLAM 0.5 MG/1
0.5 TABLET ORAL 2 TIMES DAILY PRN
Qty: 60 TABLET | Refills: 0 | Status: SHIPPED | OUTPATIENT
Start: 2023-06-23

## 2023-06-23 RX ORDER — TEMAZEPAM 30 MG/1
CAPSULE ORAL
Qty: 60 CAPSULE | Refills: 0 | Status: SHIPPED | OUTPATIENT
Start: 2023-06-23

## 2023-06-23 RX ORDER — NIFEDIPINE 60 MG/1
60 TABLET, EXTENDED RELEASE ORAL DAILY
Qty: 90 TABLET | Refills: 0 | Status: SHIPPED | OUTPATIENT
Start: 2023-06-23

## 2023-06-23 NOTE — TELEPHONE ENCOUNTER
1 [id number] 05/22/2023 05/22/2023 ALPRAZolam (Tablet) 60 0 30 0 5 MG NA [employer]  [place]    1 [id number] 05/22/2023 05/22/2023 Temazepam (Capsule) 60 0 30 30 MG NA [employer]  [employer] 0 / 0 [place]    1 [id number] 04/21/2023 04/21/2023 Temazepam (Capsule) 60 0 30 30 MG NA [employer]  [employer] 0 / 0 [place]    1 [id number] 04/21/2023 04/21/2023 ALPRAZolam (Tablet) 60 0 30 0 5 MG NA [employer]  [place]    1 [id number] 03/24/2023 03/23/2023 Temazepam (Capsule) 50 0 30 30 MG [employer]  [employer] 0 / 0 [place]    1 [id number] 03/23/2023 03/23/2023 ALPRAZolam (Tablet) 60 0 30 0 5 MG NA [employer]  [place]    1 [id number] 03/23/2023 03/23/2023 Temazepam (Capsule) 10 0 5 30 MG [employer]  [employer] 0 / 0 [place]    1 [id number] 02/10/2023 02/10/2023 ALPRAZolam (Tablet) 60 0 30 0 5 MG NA [employer]  [place]    1 [id number] 02/10/2023 02/10/2023 Temazepam (Capsule) 60 0 30 30 MG NA [employer]  [employer] 0 / 0 [place]    1 [id number] 12/16/2022 12/16/2022 ALPRAZolam (Tablet) 60 0 30 0 5 MG NA [employer]   [employer] 0 / 0 [place]

## 2023-07-05 DIAGNOSIS — L50.9 URTICARIA: ICD-10-CM

## 2023-07-05 DIAGNOSIS — K21.9 GASTROESOPHAGEAL REFLUX DISEASE: ICD-10-CM

## 2023-07-05 RX ORDER — CETIRIZINE HYDROCHLORIDE 10 MG/1
10 TABLET ORAL DAILY
Qty: 90 TABLET | Refills: 0 | Status: SHIPPED | OUTPATIENT
Start: 2023-07-05

## 2023-07-05 RX ORDER — FAMOTIDINE 40 MG/1
40 TABLET, FILM COATED ORAL
Qty: 90 TABLET | Refills: 0 | Status: SHIPPED | OUTPATIENT
Start: 2023-07-05

## 2023-07-06 ENCOUNTER — PATIENT OUTREACH (OUTPATIENT)
Dept: FAMILY MEDICINE CLINIC | Facility: CLINIC | Age: 53
End: 2023-07-06

## 2023-07-06 NOTE — PROGRESS NOTES
[facility] placed phone call to patient for status of needs. Patient reports that the real estate settlement date is on 7/17. Patient is not able to get funds as they are not being released at this time. Patient reports that all money is tied up in the home. Patient reports having a good co-signer for a rental as she has no credit due to everything being in ancasband's name. Patient is looking at 2 more places today and is hopeful. Patient is overwhelmed as she has been looking at houses, packing, and trying to keep the children calm. Children are having difficulty with the move as they want to stay in the home. Son was turned down for disability, will follow through with this when housing issue is resolved. No credit, everything was in his name. Patient reports that the family lost another pet 3 days ago. They now have an 25yo beagle left. Patient is worried how much more she and the children can take. Patient reports that dtr wants to return to school; however, she is receiving mass shooting [employer] videos. Son is receiving the same type of social media messages. Patient is receiving messages of animals blowing up. Patient reports that she knows where they are coming from. Patient reports that once their housing situation is settled, she and the children will need counseling and will need assistance in finding a provider.

## 2023-07-14 ENCOUNTER — PATIENT OUTREACH (OUTPATIENT)
Dept: FAMILY MEDICINE CLINIC | Facility: CLINIC | Age: 53
End: 2023-07-14

## 2023-07-14 NOTE — PROGRESS NOTES
YADIRACM placed call to patient. Patient reports she signed a lease yesterday and was able to negotiate an extra week in the house after settlement. Children are still unsettled with losing their home. Patient will reach out when family is ready to seek counseling. SW advised that referral would be handed off to [address] [facility] [address] moving forward.

## 2023-07-26 DIAGNOSIS — F41.0 PANIC ATTACK: ICD-10-CM

## 2023-07-26 DIAGNOSIS — F51.01 PRIMARY INSOMNIA: ICD-10-CM

## 2023-07-27 RX ORDER — TEMAZEPAM 30 MG/1
CAPSULE ORAL
Qty: 60 CAPSULE | Refills: 0 | Status: SHIPPED | OUTPATIENT
Start: 2023-07-27

## 2023-07-27 RX ORDER — ALPRAZOLAM 0.5 MG/1
0.5 TABLET ORAL 2 TIMES DAILY PRN
Qty: 60 TABLET | Refills: 0 | Status: SHIPPED | OUTPATIENT
Start: 2023-07-27

## 2023-07-27 NOTE — TELEPHONE ENCOUNTER
[id number] 06/23/2023 06/23/2023 ALPRAZolam (Tablet) 60.0 30 0.5 MG NA [employer] [place] 0 [place]     1 [id number] 06/23/2023 06/23/2023 Temazepam (Capsule) 60.0 30 30 MG NA [employer] Sarita KUNAL'  0 / 0 [address] [place] [address] Road    1 [id number] 05/22/2023 05/22/2023 ALPRAZolam (Tablet) 60.0 30 0.5 MG NA [employer]  [place] 0 [place]

## 2023-08-15 ENCOUNTER — PATIENT OUTREACH (OUTPATIENT)
Dept: FAMILY MEDICINE CLINIC | Facility: CLINIC | Age: 53
End: 2023-08-15

## 2023-08-15 NOTE — PROGRESS NOTES
Case hand off received from [employer]. Patient signed lease for new home. She plans to seek counseling for her and her children when they are ready. Spoke with patient who states she and her children are getting settled into their new place. States the move was hard but they are happy to be in new place. States her phone continues to be compromised. She is trying to sign her dtr up for school but could not complete the online. She attempted to call the enrollment rep and left voicemail; has not received return call yet. Provided patient with alternate phone number and email address for rep on school website. She will email rep and inform OP SWCM if able to reach. Will continue to follow.

## 2023-08-27 DIAGNOSIS — F41.0 PANIC ATTACK: ICD-10-CM

## 2023-08-27 DIAGNOSIS — F51.01 PRIMARY INSOMNIA: ICD-10-CM

## 2023-08-28 RX ORDER — ALPRAZOLAM 0.5 MG/1
0.5 TABLET ORAL 2 TIMES DAILY PRN
Qty: 60 TABLET | Refills: 0 | Status: SHIPPED | OUTPATIENT
Start: 2023-08-28

## 2023-08-28 RX ORDER — TEMAZEPAM 30 MG/1
CAPSULE ORAL
Qty: 60 CAPSULE | Refills: 0 | Status: SHIPPED | OUTPATIENT
Start: 2023-08-28

## 2023-08-28 NOTE — TELEPHONE ENCOUNTER
1 [id number] 07/27/2023 07/27/2023 Temazepam (Capsule) 30.0 30 30 MG NA Ludin [facility], [facility]. [place] / 0 [place]    1 [id number] 07/27/2023 07/27/2023 ALPRAZolam (Tablet) 60.0 30 0.5 MG NA [employer]. [place]    1 [id number] 06/23/2023 06/23/2023 ALPRAZolam (Tablet) 60.0 30 0.5 MG NA [employer]. [place]    1 [id number] 06/23/2023 06/23/2023 Temazepam (Capsule) 60.0 30 30 MG NA [employer]. [employer] 0 / 0 [place]    1 [id number] 05/22/2023 05/22/2023 ALPRAZolam (Tablet) 60.0 30 0.5 MG [employer], [employer]. [place] 0 [place]    1 [id number] 05/22/2023 05/22/2023 Temazepam (Capsule) 60.0 30 30 MG [employer]. [employer] 0 / 0 [place]    1 [id number] 04/21/2023 04/21/2023 Temazepam (Capsule) 60.0 30 30 MG [employer]. [employer] 0 / 0 [place]    1 [id number] 04/21/2023 04/21/2023 ALPRAZolam (Tablet) 60.0 30 0.5 MG NA [employer] [place]    1 [id number] 03/24/2023 03/23/2023 Temazepam (Capsule) 50.0 30 30 MG [employer]. [employer] 0 / 0 [place]    1 [id number] 03/23/2023 03/23/2023 ALPRAZolam (Tablet) 60.0 30 0.5 MG [employer]  Medicaid 0 / 0

## 2023-08-31 DIAGNOSIS — J01.90 ACUTE NON-RECURRENT SINUSITIS, UNSPECIFIED LOCATION: Primary | ICD-10-CM

## 2023-08-31 RX ORDER — AZITHROMYCIN 250 MG/1
TABLET, FILM COATED ORAL
Qty: 6 TABLET | Refills: 0 | Status: SHIPPED | OUTPATIENT
Start: 2023-08-31 | End: 2023-09-04

## 2023-09-06 ENCOUNTER — PATIENT OUTREACH (OUTPATIENT)
Dept: FAMILY MEDICINE CLINIC | Facility: CLINIC | Age: 53
End: 2023-09-06

## 2023-09-06 DIAGNOSIS — D75.839 THROMBOCYTHEMIA: ICD-10-CM

## 2023-09-06 DIAGNOSIS — I10 ESSENTIAL (PRIMARY) HYPERTENSION: Primary | ICD-10-CM

## 2023-09-06 DIAGNOSIS — E53.8 VITAMIN B12 DEFICIENCY: ICD-10-CM

## 2023-09-06 DIAGNOSIS — D50.9 IRON DEFICIENCY ANEMIA, UNSPECIFIED IRON DEFICIENCY ANEMIA TYPE: ICD-10-CM

## 2023-09-06 DIAGNOSIS — D52.9 ANEMIA DUE TO FOLIC ACID DEFICIENCY, UNSPECIFIED DEFICIENCY TYPE: ICD-10-CM

## 2023-09-06 NOTE — PROGRESS NOTES
Spoke with patient to check status. Patient states her dtr had a fear of being back in public school so she is doing the valuklik again. They are all experiencing sinus issues right now and are taking prescribed antibiotics. Patient would like to schedule follow-up appt for herself and her dtr with PCP and would then like to go for follow-up blood work as well. Patient states her legs are not good, her hair is falling out, and they will be returning to court; she is requesting her dtr have appt to ensure she is in good health as her dtr's father is stating she looks thin and withdrawn. Will route to PCP and clerical staff to assist with scheduling follow-up PCP appts and ordering blood work.

## 2023-09-25 DIAGNOSIS — F41.0 PANIC ATTACK: ICD-10-CM

## 2023-09-25 DIAGNOSIS — F51.01 PRIMARY INSOMNIA: ICD-10-CM

## 2023-09-26 RX ORDER — ALPRAZOLAM 0.5 MG/1
0.5 TABLET ORAL 2 TIMES DAILY PRN
Qty: 60 TABLET | Refills: 0 | Status: SHIPPED | OUTPATIENT
Start: 2023-09-26

## 2023-09-26 RX ORDER — TEMAZEPAM 30 MG/1
CAPSULE ORAL
Qty: 60 CAPSULE | Refills: 0 | Status: SHIPPED | OUTPATIENT
Start: 2023-09-26

## 2023-09-28 ENCOUNTER — TELEPHONE (OUTPATIENT)
Dept: FAMILY MEDICINE CLINIC | Facility: CLINIC | Age: 53
End: 2023-09-28

## 2023-10-03 DIAGNOSIS — L50.9 URTICARIA: ICD-10-CM

## 2023-10-03 DIAGNOSIS — K21.9 GASTROESOPHAGEAL REFLUX DISEASE: ICD-10-CM

## 2023-10-03 RX ORDER — CETIRIZINE HYDROCHLORIDE 10 MG/1
10 TABLET ORAL DAILY
Qty: 90 TABLET | Refills: 0 | Status: SHIPPED | OUTPATIENT
Start: 2023-10-03

## 2023-10-03 RX ORDER — FAMOTIDINE 40 MG/1
40 TABLET, FILM COATED ORAL
Qty: 90 TABLET | Refills: 3 | Status: SHIPPED | OUTPATIENT
Start: 2023-10-03 | End: 2023-10-13 | Stop reason: SDUPTHER

## 2023-10-04 ENCOUNTER — PATIENT OUTREACH (OUTPATIENT)
Dept: FAMILY MEDICINE CLINIC | Facility: CLINIC | Age: 53
End: 2023-10-04

## 2023-10-04 ENCOUNTER — TELEPHONE (OUTPATIENT)
Age: 53
End: 2023-10-04

## 2023-10-04 NOTE — TELEPHONE ENCOUNTER
Per patient, this has been denied by insurance before. Patient is aware because of quantity of temazepam and congruent benzodiazapine usage it will not be approved even though patient does not use both medications together. Patient will continue to pay out of pocket as she is not willing to try another medication at this time.

## 2023-10-04 NOTE — TELEPHONE ENCOUNTER
PA started for Temazepam through [address] Clinical questions answered and awaiting determination

## 2023-10-10 ENCOUNTER — PATIENT OUTREACH (OUTPATIENT)
Dept: FAMILY MEDICINE CLINIC | Facility: CLINIC | Age: 53
End: 2023-10-10

## 2023-10-10 NOTE — PROGRESS NOTES
Call received from patient stating the court is requesting comprehensive psych evaluation. Patient's divorce was finalized on 8/4/2023 and she believes the [employer] [facility] [employer] (through her ex-'s employer) has ended for her coverage. Patient has Shelby Rupesh as primary insurance. Spoke with  at [facility] [phone]. Primary insurance is confirmed to be [employer] Rupesh. Therapy appt has been scheduled for next week (Tuesday 10/17) at [facility].  Psychiatry appt will be scheduled after this therapy appt takes place. Patient will need to bring her ID and insurance card. Provided appt information to patient. Patient appreciative for OP [facility]'s assistance. Will follow.

## 2023-10-13 ENCOUNTER — PATIENT MESSAGE (OUTPATIENT)
Dept: FAMILY MEDICINE CLINIC | Facility: CLINIC | Age: 53
End: 2023-10-13

## 2023-10-13 DIAGNOSIS — K21.9 GASTROESOPHAGEAL REFLUX DISEASE: ICD-10-CM

## 2023-10-13 RX ORDER — FAMOTIDINE 40 MG/1
40 TABLET, FILM COATED ORAL
Qty: 90 TABLET | Refills: 0 | Status: SHIPPED | OUTPATIENT
Start: 2023-10-13 | End: 2023-11-16 | Stop reason: SDUPTHER

## 2023-10-29 DIAGNOSIS — I75.022 BLUE TOE SYNDROME OF LEFT LOWER EXTREMITY (HCC): ICD-10-CM

## 2023-10-30 DIAGNOSIS — I75.022 BLUE TOE SYNDROME OF LEFT LOWER EXTREMITY (HCC): ICD-10-CM

## 2023-10-30 RX ORDER — NIFEDIPINE 60 MG/1
60 TABLET, EXTENDED RELEASE ORAL DAILY
Qty: 30 TABLET | Refills: 0 | Status: SHIPPED | OUTPATIENT
Start: 2023-10-30 | End: 2023-10-30

## 2023-10-30 RX ORDER — NIFEDIPINE 60 MG/1
60 TABLET, EXTENDED RELEASE ORAL DAILY
Qty: 90 TABLET | Refills: 0 | Status: SHIPPED | OUTPATIENT
Start: 2023-10-30

## 2023-11-02 ENCOUNTER — PATIENT OUTREACH (OUTPATIENT)
Dept: FAMILY MEDICINE CLINIC | Facility: CLINIC | Age: 53
End: 2023-11-02

## 2023-11-02 NOTE — PROGRESS NOTES
Call placed to patient to check status and follow-up on need for psych evaluation. Patient was scheduled for therapy appt through [facility] on 10/17 and psych appt would be scheduled after this therapy appt took place. No answer, voicemail left, and awaiting return call.

## 2023-11-16 ENCOUNTER — PATIENT OUTREACH (OUTPATIENT)
Dept: FAMILY MEDICINE CLINIC | Facility: CLINIC | Age: 53
End: 2023-11-16

## 2023-11-16 DIAGNOSIS — K21.9 GASTROESOPHAGEAL REFLUX DISEASE: ICD-10-CM

## 2023-11-16 DIAGNOSIS — Z78.9 NEEDS ASSISTANCE WITH COMMUNITY RESOURCES: Primary | ICD-10-CM

## 2023-11-16 RX ORDER — FAMOTIDINE 40 MG/1
40 TABLET, FILM COATED ORAL
Qty: 90 TABLET | Refills: 0 | Status: SHIPPED | OUTPATIENT
Start: 2023-11-16

## 2023-11-16 NOTE — PROGRESS NOTES
2nd outreach attempt to patient to check status and follow-up on need for psych evaluation. Patient was scheduled for therapy appt through [facility] on 10/17 and psych appt would be scheduled after this therapy appt took place. Spoke with patient who confirmed she had therapy appt on 10/17 at [facility], had psychiatry appt on 11/13, and has next psychiatry appt scheduled for 12/11. Patient had reunification counseling session today; became tearful. Provided support and reminded her of new support available through therapist and psychiatrist.    She would like to schedule PCP appt; states her legs are not good. Encouraged her to call office directly to discuss issue with legs and request follow-up appt with PCP. Patient would also like help from [facility] team to have her dtr reapply for disability benefits (pt's dtr sees same PCP). States she applied previously but was denied. Informed patient that [facility] may not be able to assist with full application but can provide support in scheduling appt with Social Security Office, prepping needed documents, and potentially going with patient/patient's dtr to [employer]. Referral placed for [facility].

## 2023-11-20 ENCOUNTER — PATIENT OUTREACH (OUTPATIENT)
Dept: FAMILY MEDICINE CLINIC | Facility: CLINIC | Age: 53
End: 2023-11-20

## 2023-11-20 NOTE — PROGRESS NOTES
received referral from [facility] to outreach patient for assistance with applying for SSDI for patient and daughter. [facility] called patient and introduced myself and role. Explained I could assist with applying for benefits but patient does need to supply [facility] with all of information. Patient and [facility] set an appointment for a home visit. [facility] advised via email to patient that applying for SSDI can be done online. Patient requested [facility] email to remind of appointment. CMOC advised need to have on hand birth certificates and SS cards.      Next outreach 11/28

## 2023-11-27 ENCOUNTER — APPOINTMENT (OUTPATIENT)
Dept: LAB | Age: 53
End: 2023-11-27
Payer: COMMERCIAL

## 2023-11-27 ENCOUNTER — PATIENT OUTREACH (OUTPATIENT)
Dept: FAMILY MEDICINE CLINIC | Facility: CLINIC | Age: 53
End: 2023-11-27

## 2023-11-27 DIAGNOSIS — Z79.899 ENCOUNTER FOR LONG-TERM (CURRENT) USE OF OTHER MEDICATIONS: ICD-10-CM

## 2023-11-27 LAB
25(OH)D3 SERPL-MCNC: 27 NG/ML (ref 30–100)
ALBUMIN SERPL BCP-MCNC: 4.2 G/DL (ref 3.5–5)
ALP SERPL-CCNC: 74 U/L (ref 34–104)
ALT SERPL W P-5'-P-CCNC: 10 U/L (ref 7–52)
ANION GAP SERPL CALCULATED.3IONS-SCNC: 7 MMOL/L
AST SERPL W P-5'-P-CCNC: 12 U/L (ref 13–39)
BASOPHILS # BLD AUTO: 0.07 THOUSANDS/ÂΜL (ref 0–0.1)
BASOPHILS NFR BLD AUTO: 1 % (ref 0–1)
BILIRUB SERPL-MCNC: 0.41 MG/DL (ref 0.2–1)
BUN SERPL-MCNC: 9 MG/DL (ref 5–25)
CALCIUM SERPL-MCNC: 8.9 MG/DL (ref 8.4–10.2)
CHLORIDE SERPL-SCNC: 103 MMOL/L (ref 96–108)
CHOLEST SERPL-MCNC: 204 MG/DL
CO2 SERPL-SCNC: 24 MMOL/L (ref 21–32)
CREAT SERPL-MCNC: 0.72 MG/DL (ref 0.6–1.3)
EOSINOPHIL # BLD AUTO: 0.35 THOUSAND/ÂΜL (ref 0–0.61)
EOSINOPHIL NFR BLD AUTO: 4 % (ref 0–6)
ERYTHROCYTE [DISTWIDTH] IN BLOOD BY AUTOMATED COUNT: 14 % (ref 11.6–15.1)
GFR SERPL CREATININE-BSD FRML MDRD: 95 ML/MIN/1.73SQ M
GLUCOSE P FAST SERPL-MCNC: 92 MG/DL (ref 65–99)
HCT VFR BLD AUTO: 47.6 % (ref 34.8–46.1)
HDLC SERPL-MCNC: 57 MG/DL
HGB BLD-MCNC: 15.6 G/DL (ref 11.5–15.4)
IMM GRANULOCYTES # BLD AUTO: 0.02 THOUSAND/UL (ref 0–0.2)
IMM GRANULOCYTES NFR BLD AUTO: 0 % (ref 0–2)
LDLC SERPL CALC-MCNC: 125 MG/DL (ref 0–100)
LYMPHOCYTES # BLD AUTO: 2.07 THOUSANDS/ÂΜL (ref 0.6–4.47)
LYMPHOCYTES NFR BLD AUTO: 25 % (ref 14–44)
MCH RBC QN AUTO: 30.8 PG (ref 26.8–34.3)
MCHC RBC AUTO-ENTMCNC: 32.8 G/DL (ref 31.4–37.4)
MCV RBC AUTO: 94 FL (ref 82–98)
MONOCYTES # BLD AUTO: 0.7 THOUSAND/ÂΜL (ref 0.17–1.22)
MONOCYTES NFR BLD AUTO: 9 % (ref 4–12)
NEUTROPHILS # BLD AUTO: 5.03 THOUSANDS/ÂΜL (ref 1.85–7.62)
NEUTS SEG NFR BLD AUTO: 61 % (ref 43–75)
NONHDLC SERPL-MCNC: 147 MG/DL
NRBC BLD AUTO-RTO: 0 /100 WBCS
PLATELET # BLD AUTO: 400 THOUSANDS/UL (ref 149–390)
PMV BLD AUTO: 10.8 FL (ref 8.9–12.7)
POTASSIUM SERPL-SCNC: 4.2 MMOL/L (ref 3.5–5.3)
PROT SERPL-MCNC: 7.2 G/DL (ref 6.4–8.4)
RBC # BLD AUTO: 5.07 MILLION/UL (ref 3.81–5.12)
SODIUM SERPL-SCNC: 134 MMOL/L (ref 135–147)
TRIGL SERPL-MCNC: 112 MG/DL
TSH SERPL DL<=0.05 MIU/L-ACNC: 1.49 UIU/ML (ref 0.45–4.5)
WBC # BLD AUTO: 8.24 THOUSAND/UL (ref 4.31–10.16)

## 2023-11-27 PROCEDURE — 80053 COMPREHEN METABOLIC PANEL: CPT

## 2023-11-27 PROCEDURE — 82306 VITAMIN D 25 HYDROXY: CPT

## 2023-11-27 PROCEDURE — 85025 COMPLETE CBC W/AUTO DIFF WBC: CPT

## 2023-11-27 PROCEDURE — 84443 ASSAY THYROID STIM HORMONE: CPT

## 2023-11-27 PROCEDURE — 80061 LIPID PANEL: CPT

## 2023-11-27 PROCEDURE — 36415 COLL VENOUS BLD VENIPUNCTURE: CPT

## 2023-11-27 NOTE — PROGRESS NOTES
received an email from patient that patient does not have all of the required documents to apply for SSDI and patient will call when they have all of the documents.  [facility] emailed patient back asking what documents patient does not have and to keep [facility] posted     Next Outreach 12/4

## 2023-11-30 ENCOUNTER — PATIENT OUTREACH (OUTPATIENT)
Dept: FAMILY MEDICINE CLINIC | Facility: CLINIC | Age: 53
End: 2023-11-30

## 2023-11-30 NOTE — PROGRESS NOTES
emailed patient with SSDI Checklist for applying online. Waiting on patient to contact [address] when ready to apply. When patient is ready [address] advised will do a home visit to help applying for patient and 2 daughters.  Also on [facility] caseload     Next outreach 12/7

## 2023-11-30 NOTE — PROGRESS NOTES
Update received from [facility] who will assist patient and patient's children in applying for SSDI benefits. Patient to gather needed information/documents. CMOC to set up home visit to begin this process once patient has required documents to provide. Will follow and place referral in both charts for patient's children. Call placed to patient to check status and remind her to outreach [facility] when she has documents ready for SSDI applications. No answer, voicemail left, and awaiting return call.

## 2023-12-08 ENCOUNTER — PATIENT OUTREACH (OUTPATIENT)
Dept: FAMILY MEDICINE CLINIC | Facility: CLINIC | Age: 53
End: 2023-12-08

## 2023-12-08 NOTE — PROGRESS NOTES
called patient to see if patient was ready to make a home visit appointment to apply for SSDI  for herself and 2 children. Patients children are on [facility] caseload as well. Patient advised not ready yet. Patient advised that [place] benefits have to be renewed by 01/31/2024. Case Record # [id number]. Nothing has changed but address. [address] [facility] [address] advised patient will renew benefits via [employer]. CMOC advised patient to contact when ready for SSDI.        Next Outreach 12/22

## 2023-12-19 ENCOUNTER — PATIENT OUTREACH (OUTPATIENT)
Dept: FAMILY MEDICINE CLINIC | Facility: CLINIC | Age: 53
End: 2023-12-19

## 2023-12-19 ENCOUNTER — TELEPHONE (OUTPATIENT)
Dept: FAMILY MEDICINE CLINIC | Facility: CLINIC | Age: 53
End: 2023-12-19

## 2023-12-19 NOTE — PROGRESS NOTES
Update received from CMOC Lala stating patient is not ready to begin SSDI applications for herself and her two children.  [facility] was to also assist with MA renewal process as well.    Call placed to patient to check status.  No answer, voicemail left, and awaiting return call.

## 2023-12-20 DIAGNOSIS — E55.9 VITAMIN D DEFICIENCY: Primary | ICD-10-CM

## 2023-12-26 ENCOUNTER — PATIENT OUTREACH (OUTPATIENT)
Dept: FAMILY MEDICINE CLINIC | Facility: CLINIC | Age: 53
End: 2023-12-26

## 2023-12-26 DIAGNOSIS — L50.9 URTICARIA: ICD-10-CM

## 2023-12-26 NOTE — PROGRESS NOTES
Update received from [facility] Lala that patient's MA renewal is still in process and needs to be transferred from [facility] to [facility].    Call placed to patient to check status, remind her to look for [facility] updates, and ask when she would like to apply for SSDI benefits for her children.  Patient states she still needs to gather some paperwork for SSDI applications and requested [facility] outreach after the New Year to assist with this.  Will route to [facility] Lala.    Patient states she would like to talk with PCP about scan that was ordered regarding her lower extremities.  She has questions regarding this.  She also wants her dtr to be scheduled as she is needing updated lab work.  Encouraged her to call office to schedule these appts.    Patient states she had a flare up with her feet again; states one foot turned blue and another time both feet turned blue.  Patient felt this through her legs down to her feet.  Patient did not call PCP office when this happened.  Recommended patient needs to call office when this occurs in our to have clinical staff triage needs.  Patient agreed.  Will route to PCP and clinical staff.

## 2023-12-27 RX ORDER — CETIRIZINE HYDROCHLORIDE 10 MG/1
10 TABLET ORAL DAILY
Qty: 90 TABLET | Refills: 1 | Status: SHIPPED | OUTPATIENT
Start: 2023-12-27

## 2024-01-03 ENCOUNTER — PATIENT OUTREACH (OUTPATIENT)
Dept: FAMILY MEDICINE CLINIC | Facility: CLINIC | Age: 54
End: 2024-01-03

## 2024-01-03 NOTE — PROGRESS NOTES
emailed [facility] concerning MA renewal and if verification letter was mailed to patient. CMOC emailed [email or web address]    Per response back from [employer] provided  name Ms. Donato [phone].     CMOC called Ms. Donato and left a message asking if verification for renewal was mailed to patient and asked for a call back.

## 2024-01-10 DIAGNOSIS — F51.01 PRIMARY INSOMNIA: ICD-10-CM

## 2024-01-10 DIAGNOSIS — I74.9 ARTERIAL THROMBOSIS (HCC): Primary | ICD-10-CM

## 2024-01-10 RX ORDER — TEMAZEPAM 30 MG/1
CAPSULE ORAL
Qty: 10 CAPSULE | Refills: 0 | Status: SHIPPED | OUTPATIENT
Start: 2024-01-10

## 2024-01-17 ENCOUNTER — PATIENT OUTREACH (OUTPATIENT)
Dept: FAMILY MEDICINE CLINIC | Facility: CLINIC | Age: 54
End: 2024-01-17

## 2024-01-17 NOTE — PROGRESS NOTES
emailed patient to see if patient got any mail from [place] or [facility]. Also to check to see when patient wants to make appointment to complete SSDI online applications.     Waiting for response.     Next Outreach 1/24

## 2024-01-24 ENCOUNTER — PATIENT OUTREACH (OUTPATIENT)
Dept: FAMILY MEDICINE CLINIC | Facility: CLINIC | Age: 54
End: 2024-01-24

## 2024-01-24 NOTE — PROGRESS NOTES
Call placed to patient to check status, ask if she received mail from [facility] regarding MA renewal, ask if she is ready to complete SSDI applications with CMOC Lala's support, and check status of scheduling appt for her dtrs with therapist Tasha Streeter [phone].    No answer, voicemail left, and awaiting return call.

## 2024-01-30 ENCOUNTER — PATIENT OUTREACH (OUTPATIENT)
Dept: FAMILY MEDICINE CLINIC | Facility: CLINIC | Age: 54
End: 2024-01-30

## 2024-01-30 DIAGNOSIS — I75.022 BLUE TOE SYNDROME OF LEFT LOWER EXTREMITY (HCC): ICD-10-CM

## 2024-01-30 NOTE — PROGRESS NOTES
and patient have office visit scheduled at PCP office for 2/7 to begin the online SSDI applications.     Next Outreach 2/7

## 2024-01-31 RX ORDER — NIFEDIPINE 60 MG/1
60 TABLET, EXTENDED RELEASE ORAL DAILY
Qty: 90 TABLET | Refills: 0 | Status: SHIPPED | OUTPATIENT
Start: 2024-01-31

## 2024-01-31 NOTE — TELEPHONE ENCOUNTER
Refill must be reviewed and completed by the office or provider. The refill is unable to be approved by the medication management team.      Patient not seen since 2/1/23

## 2024-02-06 ENCOUNTER — PATIENT OUTREACH (OUTPATIENT)
Dept: FAMILY MEDICINE CLINIC | Facility: CLINIC | Age: 54
End: 2024-02-06

## 2024-02-06 NOTE — PROGRESS NOTES
called patient to confirm meeting tomorrow at PCP office to begin SSI paperwork.     Next outreach 2/7

## 2024-02-07 ENCOUNTER — PATIENT OUTREACH (OUTPATIENT)
Dept: FAMILY MEDICINE CLINIC | Facility: CLINIC | Age: 54
End: 2024-02-07

## 2024-02-07 NOTE — PROGRESS NOTES
had visit at Patients PCP [facility].     Patient stopped using [employer] card because someone in [facility] told patient it would not work due to having over 2K in the bank.     CMOC called  in [place] and spoke with Jarret about patients SSI Income. CMOC asked if SSI was still active. Jarret explained yes, however at address verification Jarret explained since change of change of address patient belonged to the [place] office.     Jarret explained that nothing could be finished with patients case until patient goes to [facility] with the following verification documents: Proof of sale of the house, Proof of money received, proof that patient paid rent for full year at $2,300.00 a month totaling $27,600 and lease.     Once patient brings those documents patient can proceed with trying to apply for SSDI. CMOC instructed patient to ask at that to apply for SSDI when patient goes to give verification documents. Patient was also given forms 827, 16, and 3368 to complete for daughters case.     Jarret explained that if those forms were filled out and dropped off prior to phone interview it would expedite the phone interview process.     CMOC advised patient to use old [id number] as a guide but to add more medical and medication information as that was the basis of the denial prior.     Patient did not appeal denial cases.     Next Outreach 2/27 for SSDI phone interview.

## 2024-02-09 ENCOUNTER — PATIENT OUTREACH (OUTPATIENT)
Dept: FAMILY MEDICINE CLINIC | Facility: CLINIC | Age: 54
End: 2024-02-09

## 2024-02-09 NOTE — PROGRESS NOTES
Update received from SHILPA Reeves who met with patient at PCP appt.  They spoke with  Rep regarding patient's SSI benefits.  Patient needing to provide verification documents to [facility] office for: Proof of sale of the house, Proof of money received, proof that patient paid rent for full year at $2,300.00 a month totaling $27,600 and lease.    SSDI application cannot be completed until above documents are provided to [facility] office.  Will follow.

## 2024-02-15 DIAGNOSIS — K21.9 GASTROESOPHAGEAL REFLUX DISEASE: ICD-10-CM

## 2024-02-15 RX ORDER — FAMOTIDINE 40 MG/1
TABLET, FILM COATED ORAL
Qty: 90 TABLET | Refills: 0 | Status: SHIPPED | OUTPATIENT
Start: 2024-02-15

## 2024-02-16 ENCOUNTER — TELEPHONE (OUTPATIENT)
Dept: ADMINISTRATIVE | Facility: OTHER | Age: 54
End: 2024-02-16

## 2024-02-16 NOTE — TELEPHONE ENCOUNTER
----- Message from Ewa Jasmine MA sent at 2/15/2024  2:21 PM EST -----  Regarding: care gap request  02/15/24 2:21 PM    Hello, our patient attached above has had HIV completed/performed. Please assist in updating the patient chart by pulling the Care Everywhere (CE) document. The date of service is 2006.     Thank you,  Ewa Jasmine PG [facility]

## 2024-02-19 ENCOUNTER — PATIENT OUTREACH (OUTPATIENT)
Dept: FAMILY MEDICINE CLINIC | Facility: CLINIC | Age: 54
End: 2024-02-19

## 2024-02-19 NOTE — PROGRESS NOTES
received an incoming email from patient that her and her family are sick and cannot get in to see the Dr until Friday so patient needed to cancel CMOC and patient PCP visit.     CMOC emailed patient and explained that our appointment is 2/27 and it is a phone interview for SSDI for patients child. CMOC asked if patient still needed to cancel our appointment but encouraged patient to keep phone interview call with SS.     Awaiting patient response.

## 2024-02-20 NOTE — TELEPHONE ENCOUNTER
Upon review of the In Basket request we were able to locate, review, and update the patient chart as requested for HIV.    Any additional questions or concerns should be emailed to the Practice Liaisons via the appropriate education email address, please do not reply via In Basket.    Thank you  Bhakti Barnes MA

## 2024-02-22 ENCOUNTER — OFFICE VISIT (OUTPATIENT)
Dept: FAMILY MEDICINE CLINIC | Facility: CLINIC | Age: 54
End: 2024-02-22

## 2024-02-22 DIAGNOSIS — J01.00 ACUTE NON-RECURRENT MAXILLARY SINUSITIS: Primary | ICD-10-CM

## 2024-02-22 DIAGNOSIS — L50.9 URTICARIA: ICD-10-CM

## 2024-02-22 RX ORDER — CETIRIZINE HYDROCHLORIDE 10 MG/1
10 TABLET ORAL DAILY
Qty: 90 TABLET | Refills: 1 | Status: SHIPPED | OUTPATIENT
Start: 2024-02-22

## 2024-02-22 RX ORDER — AZITHROMYCIN 250 MG/1
TABLET, FILM COATED ORAL
Qty: 6 TABLET | Refills: 0 | Status: SHIPPED | OUTPATIENT
Start: 2024-02-22 | End: 2024-02-26

## 2024-02-23 VITALS
TEMPERATURE: 97.5 F | HEART RATE: 86 BPM | BODY MASS INDEX: 22.76 KG/M2 | WEIGHT: 145 LBS | HEIGHT: 67 IN | DIASTOLIC BLOOD PRESSURE: 80 MMHG | SYSTOLIC BLOOD PRESSURE: 136 MMHG

## 2024-02-23 NOTE — PROGRESS NOTES
"Assessment/Plan:     Diagnoses and all orders for this visit:    Acute non-recurrent maxillary sinusitis        Discussed with patient plan to treat with a course of azithromycin  Patient instructed to call if no improvement in 72 hours or symptoms worsen    Subjective:      Patient ID: Emy Ojeda is a 53 y.o. female.    53 y.o.female presenting with sinus pressure, sore throat and congestion for the past week. Her two children also have similar symptoms. She denies fever, chills, generalized body aches, cough or shortness of breath.            The following portions of the patient's history were reviewed and updated as appropriate: allergies, current medications, past family history, past medical history, past social history, past surgical history, and problem list.    Review of Systems   Constitutional:  Negative for chills, fatigue and fever.   HENT:  Positive for congestion, postnasal drip, sinus pressure and sore throat. Negative for ear pain and rhinorrhea.    Respiratory: Negative.     Cardiovascular: Negative.    Gastrointestinal: Negative.    Musculoskeletal: Negative.    Neurological: Negative.    Psychiatric/Behavioral: Negative.         A course Objective:    /80   Pulse 86   Temp 97.5 °F (36.4 °C)   Ht 5' 7\" (1.702 m)   Wt 65.8 kg (145 lb)   LMP 04/26/2020   BMI 22.71 kg/m² (Reviewed)       Physical Exam  Vitals reviewed.   Constitutional:       General: She is not in acute distress.     Appearance: She is not ill-appearing.   HENT:      Head: Normocephalic and atraumatic.      Right Ear: Tympanic membrane, ear canal and external ear normal.      Left Ear: Tympanic membrane, ear canal and external ear normal.      Nose: Nasal tenderness, mucosal edema and congestion present.      Right Sinus: Maxillary sinus tenderness present. No frontal sinus tenderness.      Left Sinus: Maxillary sinus tenderness present. No frontal sinus tenderness.      Mouth/Throat:      Lips: Pink.      Mouth: " Mucous membranes are moist.      Pharynx: Oropharynx is clear. Posterior oropharyngeal erythema present.   Eyes:      Extraocular Movements: Extraocular movements intact.      Conjunctiva/sclera: Conjunctivae normal.      Pupils: Pupils are equal, round, and reactive to light.   Cardiovascular:      Rate and Rhythm: Normal rate and regular rhythm.      Heart sounds: Normal heart sounds.   Pulmonary:      Effort: Pulmonary effort is normal.      Breath sounds: Normal breath sounds.   Musculoskeletal:      Cervical back: Neck supple.   Skin:     General: Skin is warm and dry.      Capillary Refill: Capillary refill takes less than 2 seconds.   Neurological:      Mental Status: She is alert and oriented to person, place, and time.   Psychiatric:         Mood and Affect: Mood normal.         Behavior: Behavior normal.

## 2024-02-28 ENCOUNTER — PATIENT OUTREACH (OUTPATIENT)
Dept: FAMILY MEDICINE CLINIC | Facility: CLINIC | Age: 54
End: 2024-02-28

## 2024-02-28 NOTE — PROGRESS NOTES
called patient to check in to see how phone interview went with SS for Dtr.     CMOC left  for a return call.    Next outreach 3/1

## 2024-02-29 ENCOUNTER — PATIENT OUTREACH (OUTPATIENT)
Dept: FAMILY MEDICINE CLINIC | Facility: CLINIC | Age: 54
End: 2024-02-29

## 2024-02-29 NOTE — PROGRESS NOTES
received incoming email that patient made in person SS interview for 3/11.     CMOC emailed patient will touch base on 3/12 to see how in person interview went.     Next Outreach 3/12

## 2024-03-06 ENCOUNTER — PATIENT OUTREACH (OUTPATIENT)
Dept: FAMILY MEDICINE CLINIC | Facility: CLINIC | Age: 54
End: 2024-03-06

## 2024-03-06 NOTE — PROGRESS NOTES
Update received from OC Lala stating patient scheduled in person SS interview for 3/11. Will follow-up after this interview to check status.

## 2024-03-12 ENCOUNTER — PATIENT OUTREACH (OUTPATIENT)
Dept: FAMILY MEDICINE CLINIC | Facility: CLINIC | Age: 54
End: 2024-03-12

## 2024-03-12 NOTE — PROGRESS NOTES
called patient to check in on how appointment went for patients daughter to get SSDI.     Patient advised SSDI is in process. SS did not give a timeline for approval or denial. Patient asked about SSDI for self while at the appointment as was advised it would be denied due to lack of work credits.     Provided supportive listening to patient as patient advised ex- had child protective services called on patient because in the ex husbands words, patient felt she was being followed by FBI and had plastic on windows. CYS was out to the house according to patient and nothing came of the claim. CYS was closing the case.     Patient feels like medication is taking a toll on her liver and wants to be weaned off medications and has a Psychiatrist appointment coming up. CMOC encouraged patient to talk to Dr to wean off meds safely if that was the best plan for patient.     Patient wants to talk to YADIRA Menezes for group therapy options for other daughter.     Next Outreach 4/11

## 2024-03-13 ENCOUNTER — PATIENT OUTREACH (OUTPATIENT)
Dept: FAMILY MEDICINE CLINIC | Facility: CLINIC | Age: 54
End: 2024-03-13

## 2024-03-13 NOTE — PROGRESS NOTES
Update received from OC Lala stating patient is interested in group programs for her dtr.  Patient also asked  office about SSDI benefits for herself but was advised she would be denied due to lack of work credits.    Spoke with patient who confirmed she is looking for social interaction opportunities for her dtrs.  They continue to see therapist weekly; missed a few sessions due to sickness but next session is Saturday.  Provided below resources to patient to review with her dtrs:    [facility] Teen Programs/Teen Advisory Board/Service Projects for Teens  -Teens - [facility] ([facility])  -Service Projects for Teens - [facility]    Youth Enrichment Programs [email or web address] [email or web address]  -[facility] - [facility] - Teen Center - [place] - Youth Enrichment Programs - [place], [place] ([employer]org)  -[facility] - [facility] of the [facility] - Youth Enrichment Programs - [place], [place] ([employer]org)  -[facility] - [facility] - Youth Enrichment Programs - [place], [place] ([employer])  -[facility] - [facility] - [place] - Youth Enrichment Programs - [place], [place] ([employer].org)    Patient continues to see therapist and psychiatrist through [facility].  Next session with psychiatrist is Monday.  She needs to reschedule with therapist.  Feels she is not getting enough out of the sessions.  Encouraged her to discuss with MH team at [facility] and inquire about trying an alternate therapy approach.    Will follow.

## 2024-04-03 ENCOUNTER — PATIENT OUTREACH (OUTPATIENT)
Dept: FAMILY MEDICINE CLINIC | Facility: CLINIC | Age: 54
End: 2024-04-03

## 2024-04-03 NOTE — PROGRESS NOTES
Call placed to patient to check status and follow-up on community resources/social activities that were provided to her for her children to consider participating in.    No answer, voicemail left, and awaiting return call.

## 2024-04-12 ENCOUNTER — PATIENT OUTREACH (OUTPATIENT)
Dept: FAMILY MEDICINE CLINIC | Facility: CLINIC | Age: 54
End: 2024-04-12

## 2024-04-12 NOTE — PROGRESS NOTES
Additional call placed to patient to check status and follow-up on community resources/social activities that were provided to her for her children to consider participating in.    No answer, voicemail left, and awaiting return call.    Will send Unable to Reach letter to patient via "[employer]t and close case at this time.

## 2024-04-12 NOTE — LETTER
[address] [facility] [address]  [place] [address]    Re: Unable to Reach   4/12/2024       Dear Emy,    I tried to reach you by phone and was unfortunately unable to reach you.  Please return my call if I can further assist in connecting you/your family to any supportive community resources.  Thank you.    Sincerely,         Riri Dai      [phone]

## 2024-04-27 DIAGNOSIS — I75.022 BLUE TOE SYNDROME OF LEFT LOWER EXTREMITY (HCC): ICD-10-CM

## 2024-04-27 RX ORDER — NIFEDIPINE 60 MG/1
60 TABLET, EXTENDED RELEASE ORAL DAILY
Qty: 90 TABLET | Refills: 1 | Status: SHIPPED | OUTPATIENT
Start: 2024-04-27

## 2024-05-11 DIAGNOSIS — K21.9 GASTROESOPHAGEAL REFLUX DISEASE: ICD-10-CM

## 2024-05-12 RX ORDER — FAMOTIDINE 40 MG/1
TABLET, FILM COATED ORAL
Qty: 90 TABLET | Refills: 1 | Status: SHIPPED | OUTPATIENT
Start: 2024-05-12

## 2024-06-18 ENCOUNTER — VBI (OUTPATIENT)
Dept: ADMINISTRATIVE | Facility: OTHER | Age: 54
End: 2024-06-18

## 2024-06-18 NOTE — TELEPHONE ENCOUNTER
06/18/24 8:52 AM     Chart reviewed for Pap Smear (HPV) aka Cervical Cancer Screening ; nothing is submitted to the patient's insurance at this time.     Mary Parker   [facility]

## 2024-07-15 DIAGNOSIS — F51.01 PRIMARY INSOMNIA: ICD-10-CM

## 2024-07-15 NOTE — TELEPHONE ENCOUNTER
"Pt called and stated she having trouble sleeping and its been \"awhile\". She has taken temazepam before (winter months) and it helped, other medications have caused bad reactions in patient. Can we please call her in a prescription for this, Pt also was put on wait list for [facility] as she did not want to see anyone else.Please call pt to advise if we can call this in for her and discuss follow up appt with [facility]. Pt herb is not working so would need to be called. Thank you   "

## 2024-07-15 NOTE — TELEPHONE ENCOUNTER
1 [id number] 02/19/2024 01/22/2024 Temazepam (Capsule) 30.0 30 30 MG NA [employer] [place] 1 / 1 [place]    1 [id number] 01/22/2024 01/22/2024 Temazepam (Capsule) 30.0 30 30 MG NA [employer] [email or web address] [place] / 1 [place]    1 [id number] 01/11/2024 01/10/2024 Temazepam (Capsule) 10.0 5 30 MG NA [employer] Commercial [place] / 0 [place]    1 [id number] 12/14/2023 12/14/2023 Temazepam (Capsule) 30.0 30 30 MG NA [employer] [place] 0 [place]    1 [id number] 10/16/2023 09/26/2023 Temazepam (Capsule) 30.0 30 30 MG NA [employer] [place] / 0 [place]    1 [id number] 09/26/2023 09/26/2023 ALPRAZolam (Tablet) 60.0 30 0.5 MG NA [employer] [place] 0 [place]    1 [id number] 08/28/2023 08/28/2023 ALPRAZolam (Tablet) 60.0 30 0.5 MG NA [employer] [place] 0 [place]    2 [id number] 07/27/2023 07/27/2023 Temazepam (Capsule) 30.0 30 30 MG NA [employer] [place] 0 [place]    2 [id number] 07/27/2023 07/27/2023 ALPRAZolam (Tablet) 60.0 30 0.5 MG NA [employer] [place] / 0 [place]

## 2024-07-16 RX ORDER — TEMAZEPAM 30 MG/1
CAPSULE ORAL
Qty: 10 CAPSULE | Refills: 0 | Status: SHIPPED | OUTPATIENT
Start: 2024-07-16

## 2024-08-02 ENCOUNTER — OFFICE VISIT (OUTPATIENT)
Dept: FAMILY MEDICINE CLINIC | Facility: CLINIC | Age: 54
End: 2024-08-02

## 2024-08-02 VITALS
WEIGHT: 169.5 LBS | DIASTOLIC BLOOD PRESSURE: 78 MMHG | TEMPERATURE: 97 F | BODY MASS INDEX: 26.6 KG/M2 | HEIGHT: 67 IN | SYSTOLIC BLOOD PRESSURE: 110 MMHG | HEART RATE: 75 BPM | OXYGEN SATURATION: 97 %

## 2024-08-02 DIAGNOSIS — F51.01 PRIMARY INSOMNIA: Primary | ICD-10-CM

## 2024-08-02 DIAGNOSIS — F33.1 MODERATE EPISODE OF RECURRENT MAJOR DEPRESSIVE DISORDER (HCC): ICD-10-CM

## 2024-08-02 DIAGNOSIS — I73.89 ACROCYANOSIS (HCC): ICD-10-CM

## 2024-08-02 DIAGNOSIS — F41.1 GENERALIZED ANXIETY DISORDER: ICD-10-CM

## 2024-08-02 DIAGNOSIS — I75.022 BLUE TOE SYNDROME OF LEFT LOWER EXTREMITY (HCC): ICD-10-CM

## 2024-08-02 NOTE — PROGRESS NOTES
Ambulatory Visit  Name: Emy Ojeda      : 1970      MRN: [id number]  Encounter Provider: JOSE Jaquez  Encounter Date: 2024   Encounter department: [facility]    Assessment & Plan   1. Primary insomnia  2. Moderate episode of recurrent major depressive disorder (HCC)  3. Generalized anxiety disorder  4. Blue toe syndrome of left lower extremity (HCC)  5. Acrocyanosis (HCC)    #1 Primary insomnia  Patient does not want to go back on benzodiazepines and wants to try all natural remedies so is looking into obtaining a medical marijuana card  #2 Moderate episode of recurrent major depressive disorder (HCC)  Patient does not want to go back on benzodiazepines and wants to try all natural remedies so is looking into obtaining a medical marijuana card  #3 Generalized anxiety disorder  Patient does not want to go back on benzodiazepines and wants to try all natural remedies so is looking into obtaining a medical marijuana card  #4 Blue toe syndrome of left lower extremity  Discussed with patient plan to have her schedule the previous ordered duplex to re-evaluate  #5 Acrocyanosis (HCC)  Discussed with patient plan to have her schedule the previous ordered duplex to re-evaluate      Depression Screening and Follow-up Plan: Patient's depression screening was positive with a PHQ-9 score of 24. Patient assessed for underlying major depression. Brief counseling provided and recommend additional follow-up/re-evaluation next office visit. Patient with underlying depression and was advised to continue current medications as prescribed.     Tobacco Cessation Counseling: The patient is sincerely urged to quit consumption of tobacco. She is not ready to quit tobacco. Patient refused medication.       History of Present Illness     53 y.o.female presenting to discuss need for health summary so she can attempt to obtain a medical marijuana card to treat insomnia and anxiety. Patient also  reports that she has continued to gain weight and her two children and her are going to start going to the [facility] to exercise. She wants to make sure that she will be alright to exercise due to her past leg issues. It was recommended to her to obtain previously order duplex of lower extremities to be best evaluate for potential hazards .        Review of Systems   Constitutional: Negative.    Respiratory: Negative.     Cardiovascular: Negative.    Gastrointestinal: Negative.    Musculoskeletal:  Positive for myalgias.   Neurological: Negative.    Psychiatric/Behavioral:  Positive for dysphoric mood and sleep disturbance. The patient is nervous/anxious.      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   • Clotting disorder (HCC)    • Depression    • Fibroid    • GERD (gastroesophageal reflux disease)    • Palpitations    • S/P laparoscopy 5/11/2020   • Status post myomectomy 5/11/2020   • Status post vaginal hysterectomy 5/11/2020   • Submucous leiomyoma of uterus 5/11/2020     Past Surgical History:   Procedure Laterality Date   • EGD     • FL CYSTOURETHROSCOPY N/A 5/11/2020    Procedure: CYSTOSCOPY;  Surgeon: Cristela Macario MD;  [facility];  Service: Gynecology   • FL LAPS ABD PRTM&OMENTUM DX W/WO SPEC BR/WA SPX N/A 5/11/2020    Procedure: LAPAROSCOPY DIAGNOSTIC;  Surgeon: Cristela Macario MD;  [facility];  Service: Gynecology   • FL MYOMECTOMY 1-4 MYOMAS 250 GM/< VAGINAL APPR N/A 5/11/2020    Procedure: MYOMECTOMY WITH HYSTEROSCOPY;  Surgeon: Cristela Macario MD;  [facility];  Service: Gynecology   • FL VAG HYST 250 GM/< W/RMVL TUBE&/OVARY N/A 5/11/2020    Procedure: TOTAL VAGINAL HYSTERECTOMY WITH BILATERAL SALPINGECTOMY;  Surgeon: Cristela Macario MD;  [facility];  Service: Gynecology   • WISDOM TOOTH EXTRACTION       Family History   Problem  "Relation Age of Onset   • Hypertension Mother    • Coronary artery disease Father    • Heart failure Father    • Heart attack Father      Social History     Tobacco Use   • Smoking status: Every Day     Current packs/day: 0.50     Average packs/day: 0.5 packs/day for 30.0 years (15.0 ttl pk-yrs)     Types: Cigarettes     Passive exposure: Current   • Smokeless tobacco: Never   Vaping Use   • Vaping status: Never Used   Substance and Sexual Activity   • Alcohol use: Not Currently   • Drug use: Never   • Sexual activity: Not Currently     Partners: Male     Birth control/protection: None     Current Outpatient Medications on File Prior to Visit   Medication Sig   • albuterol (ProAir HFA) 90 mcg/act inhaler Inhale 2 puffs every 6 (six) hours as needed for wheezing   • aspirin 325 mg tablet Take 325 mg by mouth daily   • cetirizine (ZyrTEC) 10 mg tablet Take 1 tablet (10 mg total) by mouth daily   • famotidine (PEPCID) 40 MG tablet TAKE 1 TABLET(40 MG) BY MOUTH DAILY EARLY MORNING   • NIFEdipine (PROCARDIA XL) 60 mg 24 hr tablet TAKE 1 TABLET(60 MG) BY MOUTH DAILY   • [DISCONTINUED] ALPRAZolam (XANAX) 0.5 mg tablet Take 1 tablet (0.5 mg total) by mouth 2 (two) times a day as needed for anxiety (Patient not taking: Reported on 8/2/2024)   • [DISCONTINUED] temazepam (RESTORIL) 30 mg capsule TAKE 2 CAPSULES BY MOUTH AT BEDTIME (Patient not taking: Reported on 8/2/2024)     Allergies   Allergen Reactions   • Duloxetine Hcl Hives   • Eggs Or Egg-Derived Products - Food Allergy Abdominal Pain   • Zolpidem Confusion       There is no immunization history on file for this patient.  Objective     /78 (BP Location: Left arm, Patient Position: Sitting, Cuff Size: Large)   Pulse 75   Temp (!) 97 °F (36.1 °C)   Ht 5' 7\" (1.702 m)   Wt 76.9 kg (169 lb 8 oz)   LMP 04/26/2020   SpO2 97%   BMI 26.55 kg/m² (Reviewed)    Physical Exam  Vitals reviewed.   Constitutional:       General: She is not in acute distress.     " Appearance: She is not ill-appearing.   HENT:      Head: Normocephalic and atraumatic.      Right Ear: External ear normal.      Left Ear: External ear normal.   Eyes:      Extraocular Movements: Extraocular movements intact.      Conjunctiva/sclera: Conjunctivae normal.      Pupils: Pupils are equal, round, and reactive to light.   Cardiovascular:      Rate and Rhythm: Normal rate and regular rhythm.      Heart sounds: Normal heart sounds.   Pulmonary:      Effort: Pulmonary effort is normal.      Breath sounds: Normal breath sounds.   Abdominal:      General: Abdomen is flat. Bowel sounds are normal. There is no distension.      Palpations: Abdomen is soft.      Tenderness: There is no abdominal tenderness.   Musculoskeletal:      Right lower leg: No edema.      Left lower leg: No edema.   Skin:     General: Skin is warm and dry.      Capillary Refill: Capillary refill takes less than 2 seconds.   Neurological:      Mental Status: She is alert and oriented to person, place, and time.   Psychiatric:         Mood and Affect: Mood is anxious and depressed.         Speech: Speech normal.         Behavior: Behavior normal. Behavior is cooperative.

## 2024-08-08 DIAGNOSIS — I75.022 BLUE TOE SYNDROME OF LEFT LOWER EXTREMITY (HCC): ICD-10-CM

## 2024-08-08 RX ORDER — NIFEDIPINE 60 MG/1
60 TABLET, EXTENDED RELEASE ORAL DAILY
Qty: 100 TABLET | Refills: 1 | Status: SHIPPED | OUTPATIENT
Start: 2024-08-08

## 2024-08-15 ENCOUNTER — NURSE TRIAGE (OUTPATIENT)
Age: 54
End: 2024-08-15

## 2024-08-15 DIAGNOSIS — J01.90 ACUTE NON-RECURRENT SINUSITIS, UNSPECIFIED LOCATION: Primary | ICD-10-CM

## 2024-08-15 RX ORDER — AZITHROMYCIN 250 MG/1
TABLET, FILM COATED ORAL
Qty: 6 TABLET | Refills: 0 | Status: SHIPPED | OUTPATIENT
Start: 2024-08-15 | End: 2024-08-19

## 2024-08-15 NOTE — TELEPHONE ENCOUNTER
"Family 1 of 3  Patient called in to report sinus infection for the past 7-9 days. Symptoms include headache between eyes, mouth pain, stiff neck, sore throat, and ear pain. Patient requesting to be sent to [facility] on profile order for antibiotic therapy since she is not able to drive to . Please follow up with patient for provider response.    Reason for Disposition   Sinus congestion (pressure, fullness) present > 10 days    Answer Assessment - Initial Assessment Questions  1. LOCATION: \"Where does it hurt?\"       Between eyes, mouth pain, neck stiffness, sore thoat  2. ONSET: \"When did the sinus pain start?\"  (e.g., hours, days)       Past 7+ days  3. SEVERITY: \"How bad is the pain?\"   (Scale 1-10; mild, moderate or severe)    - MILD (1-3): doesn't interfere with normal activities     - MODERATE (4-7): interferes with normal activities (e.g., work or school) or awakens from sleep    - SEVERE (8-10): excruciating pain and patient unable to do any normal activities         Moderate 7/10; taking ibuprofen constantly  4. RECURRENT SYMPTOM: \"Have you ever had sinus problems before?\" If Yes, ask: \"When was the last time?\" and \"What happened that time?\"       Yes; resolved with antibiotic order  5. NASAL CONGESTION: \"Is the nose blocked?\" If Yes, ask: \"Can you open it or must you breathe through the mouth?\"      Yes; blockage post sneezing  6. NASAL DISCHARGE: \"Do you have discharge from your nose?\" If so ask, \"What color?\"      Phlegm color is not known  7. FEVER: \"Do you have a fever?\" If Yes, ask: \"What is it, how was it measured, and when did it start?\"       Denies  8. OTHER SYMPTOMS: \"Do you have any other symptoms?\" (e.g., sore throat, cough, earache, difficulty breathing)      Sore throat, mouth pain, stiff neck; ears feel like they want to close up.  9. PREGNANCY: \"Is there any chance you are pregnant?\" \"When was your last menstrual period?\"      Post menopausal    Protocols used: Sinus Pain or " Congestion-ADULT-[place]

## 2024-08-15 NOTE — TELEPHONE ENCOUNTER
Attempted to call back it sounds like someone picks up then disconnects. Attempted a second time and line was busy

## 2024-08-18 DIAGNOSIS — L50.9 URTICARIA: ICD-10-CM

## 2024-08-18 DIAGNOSIS — J06.9 ACUTE URI: ICD-10-CM

## 2024-08-18 DIAGNOSIS — K21.9 GASTROESOPHAGEAL REFLUX DISEASE: ICD-10-CM

## 2024-08-18 RX ORDER — ALBUTEROL SULFATE 90 UG/1
2 AEROSOL, METERED RESPIRATORY (INHALATION) EVERY 6 HOURS PRN
Qty: 6.7 G | Refills: 1 | Status: SHIPPED | OUTPATIENT
Start: 2024-08-18

## 2024-08-18 RX ORDER — CETIRIZINE HYDROCHLORIDE 10 MG/1
10 TABLET ORAL DAILY
Qty: 90 TABLET | Refills: 1 | Status: SHIPPED | OUTPATIENT
Start: 2024-08-18

## 2024-08-19 RX ORDER — FAMOTIDINE 40 MG/1
40 TABLET, FILM COATED ORAL DAILY
Qty: 90 TABLET | Refills: 1 | Status: SHIPPED | OUTPATIENT
Start: 2024-08-19

## 2024-09-25 ENCOUNTER — OFFICE VISIT (OUTPATIENT)
Dept: FAMILY MEDICINE CLINIC | Facility: CLINIC | Age: 54
End: 2024-09-25

## 2024-09-25 VITALS
HEIGHT: 67 IN | DIASTOLIC BLOOD PRESSURE: 82 MMHG | OXYGEN SATURATION: 99 % | TEMPERATURE: 97.7 F | BODY MASS INDEX: 27.47 KG/M2 | HEART RATE: 65 BPM | WEIGHT: 175 LBS | SYSTOLIC BLOOD PRESSURE: 128 MMHG

## 2024-09-25 DIAGNOSIS — E53.8 VITAMIN B12 DEFICIENCY: ICD-10-CM

## 2024-09-25 DIAGNOSIS — M54.50 ACUTE LOW BACK PAIN WITHOUT SCIATICA, UNSPECIFIED BACK PAIN LATERALITY: ICD-10-CM

## 2024-09-25 DIAGNOSIS — H60.503 ACUTE OTITIS EXTERNA OF BOTH EARS, UNSPECIFIED TYPE: ICD-10-CM

## 2024-09-25 DIAGNOSIS — E55.9 VITAMIN D DEFICIENCY: ICD-10-CM

## 2024-09-25 DIAGNOSIS — J01.40 ACUTE NON-RECURRENT PANSINUSITIS: Primary | ICD-10-CM

## 2024-09-25 DIAGNOSIS — I10 ESSENTIAL (PRIMARY) HYPERTENSION: ICD-10-CM

## 2024-09-25 DIAGNOSIS — D50.9 IRON DEFICIENCY ANEMIA, UNSPECIFIED IRON DEFICIENCY ANEMIA TYPE: ICD-10-CM

## 2024-09-25 DIAGNOSIS — D75.839 THROMBOCYTHEMIA: ICD-10-CM

## 2024-09-25 RX ORDER — NEOMYCIN SULFATE, POLYMYXIN B SULFATE, HYDROCORTISONE 3.5; 10000; 1 MG/ML; [USP'U]/ML; MG/ML
4 SOLUTION/ DROPS AURICULAR (OTIC) EVERY 8 HOURS SCHEDULED
Qty: 10 ML | Refills: 0 | Status: SHIPPED | OUTPATIENT
Start: 2024-09-25

## 2024-09-25 RX ORDER — CEFDINIR 300 MG/1
300 CAPSULE ORAL EVERY 12 HOURS SCHEDULED
Qty: 14 CAPSULE | Refills: 0 | Status: SHIPPED | OUTPATIENT
Start: 2024-09-25 | End: 2024-10-02

## 2024-09-25 RX ORDER — OXYCODONE AND ACETAMINOPHEN 5; 325 MG/1; MG/1
1 TABLET ORAL EVERY 8 HOURS PRN
Qty: 20 TABLET | Refills: 0 | Status: SHIPPED | OUTPATIENT
Start: 2024-09-25

## 2024-09-25 RX ORDER — ARIPIPRAZOLE 5 MG/1
5 TABLET ORAL
COMMUNITY
Start: 2024-09-09

## 2024-09-25 NOTE — PROGRESS NOTES
Ambulatory Visit  Name: Emy Ojeda      : 1970      MRN: [id number]  Encounter Provider: JOSE Jaquez  Encounter Date: 2024   Encounter department: [facility]    Assessment & Plan  Acute non-recurrent pansinusitis    Orders:    cefdinir (OMNICEF) 300 mg capsule; Take 1 capsule (300 mg total) by mouth every 12 (twelve) hours for 7 days    Acute otitis externa of both ears, unspecified type    Orders:    neomycin-polymyxin-hydrocortisone (CORTISPORIN) otic solution; Administer 4 drops to the right ear every 8 (eight) hours    Acute low back pain without sciatica, unspecified back pain laterality    Orders:    oxyCODONE-acetaminophen (Percocet) 5-325 mg per tablet; Take 1 tablet by mouth every 8 (eight) hours as needed for moderate pain Max Daily Amount: 3 tablets    Thrombocythemia    Orders:    CBC and differential; Future    CBC and differential    Essential (primary) hypertension    Orders:    Lipid panel; Future    Comprehensive metabolic panel; Future    Lipid panel    Comprehensive metabolic panel    Iron deficiency anemia, unspecified iron deficiency anemia type         Vitamin D deficiency    Orders:    Vitamin D 25 hydroxy; Future    Vitamin D 25 hydroxy    Vitamin B12 deficiency    Orders:    Vitamin B12; Future    Vitamin B12         History of Present Illness     53 y.o.female presenting with ear fullness, cough, sinus pressure, wheezing and shortness of breath for the past few weeks.  She also reports that she hurt her back recently and is requesting pain medication for short period. She is also requesting routine labs for the year.        Review of Systems   Constitutional:  Negative for chills, fatigue and fever.   HENT:  Positive for congestion, ear pain (fullness) and sinus pressure.    Respiratory:  Positive for cough, shortness of breath and wheezing.    Cardiovascular: Negative.    Gastrointestinal: Negative.    Musculoskeletal: Negative.     Psychiatric/Behavioral: Negative.       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    Clotting disorder (HCC)     Depression     Fibroid     GERD (gastroesophageal reflux disease)     Palpitations     S/P laparoscopy 5/11/2020    Status post myomectomy 5/11/2020    Status post vaginal hysterectomy 5/11/2020    Submucous leiomyoma of uterus 5/11/2020     Past Surgical History:   Procedure Laterality Date    EGD      PA CYSTOURETHROSCOPY N/A 5/11/2020    Procedure: CYSTOSCOPY;  Surgeon: Cristela Macario MD;  [facility];  Service: Gynecology    PA LAPS ABD PRTM&OMENTUM DX W/WO SPEC BR/WA SPX N/A 5/11/2020    Procedure: LAPAROSCOPY DIAGNOSTIC;  Surgeon: Cristela Macario MD;  [facility];  Service: Gynecology    PA MYOMECTOMY 1-4 MYOMAS 250 GM/< VAGINAL APPR N/A 5/11/2020    Procedure: MYOMECTOMY WITH HYSTEROSCOPY;  Surgeon: Cristela Macario MD;  [facility];  Service: Gynecology    PA VAG HYST 250 GM/< W/RMVL TUBE&/OVARY N/A 5/11/2020    Procedure: TOTAL VAGINAL HYSTERECTOMY WITH BILATERAL SALPINGECTOMY;  Surgeon: Cristela Macario MD;  [facility];  Service: Gynecology    WISDOM TOOTH EXTRACTION       Family History   Problem Relation Age of Onset    Hypertension Mother     Coronary artery disease Father     Heart failure Father     Heart attack Father      Social History     Tobacco Use    Smoking status: Every Day     Current packs/day: 0.50     Average packs/day: 0.5 packs/day for 30.0 years (15.0 ttl pk-yrs)     Types: Cigarettes     Passive exposure: Current    Smokeless tobacco: Never   Vaping Use    Vaping status: Never Used   Substance and Sexual Activity    Alcohol use: Not Currently    Drug use: Never    Sexual activity: Not Currently     Partners: Male     Birth control/protection: None     Current Outpatient Medications on File Prior to Visit  "  Medication Sig    albuterol (ProAir HFA) 90 mcg/act inhaler Inhale 2 puffs every 6 (six) hours as needed for wheezing    ARIPiprazole (ABILIFY) 5 mg tablet Take 5 mg by mouth daily at bedtime    aspirin 325 mg tablet Take 325 mg by mouth daily    cetirizine (ZyrTEC) 10 mg tablet Take 1 tablet (10 mg total) by mouth daily    famotidine (PEPCID) 40 MG tablet Take 1 tablet (40 mg total) by mouth daily    NIFEdipine (PROCARDIA XL) 60 mg 24 hr tablet TAKE 1 TABLET(60 MG) BY MOUTH DAILY     Allergies   Allergen Reactions    Duloxetine Hcl Hives    Eggs Or Egg-Derived Products - Food Allergy Abdominal Pain    Zolpidem Confusion       There is no immunization history on file for this patient.  Objective     /82 (BP Location: Left arm, Patient Position: Sitting, Cuff Size: Large)   Pulse 65   Temp 97.7 °F (36.5 °C)   Ht 5' 7\" (1.702 m)   Wt 79.4 kg (175 lb)   LMP 04/26/2020   SpO2 99%   BMI 27.41 kg/m² (Reviewed)    Physical Exam  Vitals reviewed.   Constitutional:       General: She is not in acute distress.     Appearance: She is not ill-appearing.   HENT:      Head: Normocephalic and atraumatic.      Right Ear: Ear canal and external ear normal. Swelling and tenderness present. Tympanic membrane is retracted.      Left Ear: Ear canal and external ear normal. Swelling and tenderness present. Tympanic membrane is retracted.      Nose: Nasal tenderness and congestion present.      Right Sinus: Maxillary sinus tenderness and frontal sinus tenderness present.      Left Sinus: Maxillary sinus tenderness and frontal sinus tenderness present.      Mouth/Throat:      Lips: Pink.      Mouth: Mucous membranes are moist.      Pharynx: Oropharynx is clear.   Eyes:      Extraocular Movements: Extraocular movements intact.      Conjunctiva/sclera: Conjunctivae normal.      Pupils: Pupils are equal, round, and reactive to light.   Cardiovascular:      Rate and Rhythm: Normal rate and regular rhythm.      Heart sounds: " Normal heart sounds.   Pulmonary:      Effort: Pulmonary effort is normal.      Breath sounds: Normal breath sounds.   Abdominal:      General: Abdomen is flat. Bowel sounds are normal. There is no distension.      Palpations: Abdomen is soft.      Tenderness: There is no abdominal tenderness.   Musculoskeletal:      Cervical back: Neck supple.      Right lower leg: No edema.      Left lower leg: No edema.   Skin:     General: Skin is warm and dry.      Capillary Refill: Capillary refill takes less than 2 seconds.   Neurological:      Mental Status: She is alert and oriented to person, place, and time.   Psychiatric:         Mood and Affect: Mood normal.         Behavior: Behavior normal.

## 2024-09-26 NOTE — ASSESSMENT & PLAN NOTE
Orders:    Lipid panel; Future    Comprehensive metabolic panel; Future    Lipid panel    Comprehensive metabolic panel

## 2024-10-08 ENCOUNTER — APPOINTMENT (OUTPATIENT)
Dept: LAB | Age: 54
End: 2024-10-08
Payer: COMMERCIAL

## 2024-10-08 DIAGNOSIS — I10 HYPERTENSION, ESSENTIAL: ICD-10-CM

## 2024-10-08 DIAGNOSIS — D75.839 THROMBOCYTHEMIA: ICD-10-CM

## 2024-10-08 DIAGNOSIS — E55.9 VITAMIN D DEFICIENCY DISEASE: ICD-10-CM

## 2024-10-08 DIAGNOSIS — J06.9 ACUTE URI: ICD-10-CM

## 2024-10-08 LAB
25(OH)D3 SERPL-MCNC: 44 NG/ML (ref 30–100)
ALBUMIN SERPL BCG-MCNC: 4.4 G/DL (ref 3.5–5)
ALP SERPL-CCNC: 81 U/L (ref 34–104)
ALT SERPL W P-5'-P-CCNC: 23 U/L (ref 7–52)
ANION GAP SERPL CALCULATED.3IONS-SCNC: 8 MMOL/L (ref 4–13)
AST SERPL W P-5'-P-CCNC: 19 U/L (ref 13–39)
BASOPHILS # BLD AUTO: 0.06 THOUSANDS/ΜL (ref 0–0.1)
BASOPHILS NFR BLD AUTO: 1 % (ref 0–1)
BILIRUB SERPL-MCNC: 0.37 MG/DL (ref 0.2–1)
BUN SERPL-MCNC: 12 MG/DL (ref 5–25)
CALCIUM SERPL-MCNC: 9.2 MG/DL (ref 8.4–10.2)
CHLORIDE SERPL-SCNC: 104 MMOL/L (ref 96–108)
CO2 SERPL-SCNC: 26 MMOL/L (ref 21–32)
CREAT SERPL-MCNC: 0.78 MG/DL (ref 0.6–1.3)
EOSINOPHIL # BLD AUTO: 0.58 THOUSAND/ΜL (ref 0–0.61)
EOSINOPHIL NFR BLD AUTO: 8 % (ref 0–6)
ERYTHROCYTE [DISTWIDTH] IN BLOOD BY AUTOMATED COUNT: 14.5 % (ref 11.6–15.1)
GFR SERPL CREATININE-BSD FRML MDRD: 86 ML/MIN/1.73SQ M
GLUCOSE SERPL-MCNC: 80 MG/DL (ref 65–140)
HCT VFR BLD AUTO: 48.7 % (ref 34.8–46.1)
HGB BLD-MCNC: 15.8 G/DL (ref 11.5–15.4)
IMM GRANULOCYTES # BLD AUTO: 0.01 THOUSAND/UL (ref 0–0.2)
IMM GRANULOCYTES NFR BLD AUTO: 0 % (ref 0–2)
LYMPHOCYTES # BLD AUTO: 2.12 THOUSANDS/ΜL (ref 0.6–4.47)
LYMPHOCYTES NFR BLD AUTO: 28 % (ref 14–44)
MCH RBC QN AUTO: 31.3 PG (ref 26.8–34.3)
MCHC RBC AUTO-ENTMCNC: 32.4 G/DL (ref 31.4–37.4)
MCV RBC AUTO: 97 FL (ref 82–98)
MONOCYTES # BLD AUTO: 0.69 THOUSAND/ΜL (ref 0.17–1.22)
MONOCYTES NFR BLD AUTO: 9 % (ref 4–12)
NEUTROPHILS # BLD AUTO: 4.17 THOUSANDS/ΜL (ref 1.85–7.62)
NEUTS SEG NFR BLD AUTO: 54 % (ref 43–75)
NRBC BLD AUTO-RTO: 0 /100 WBCS
PLATELET # BLD AUTO: 422 THOUSANDS/UL (ref 149–390)
PMV BLD AUTO: 10.7 FL (ref 8.9–12.7)
POTASSIUM SERPL-SCNC: 4 MMOL/L (ref 3.5–5.3)
PROT SERPL-MCNC: 7.3 G/DL (ref 6.4–8.4)
RBC # BLD AUTO: 5.04 MILLION/UL (ref 3.81–5.12)
SODIUM SERPL-SCNC: 138 MMOL/L (ref 135–147)
VIT B12 SERPL-MCNC: 849 PG/ML (ref 180–914)
WBC # BLD AUTO: 7.63 THOUSAND/UL (ref 4.31–10.16)

## 2024-10-08 PROCEDURE — 85025 COMPLETE CBC W/AUTO DIFF WBC: CPT

## 2024-10-08 PROCEDURE — 80053 COMPREHEN METABOLIC PANEL: CPT

## 2024-10-08 PROCEDURE — 36415 COLL VENOUS BLD VENIPUNCTURE: CPT

## 2024-10-08 PROCEDURE — 82607 VITAMIN B-12: CPT

## 2024-10-08 PROCEDURE — 82306 VITAMIN D 25 HYDROXY: CPT

## 2024-10-09 RX ORDER — ALBUTEROL SULFATE 90 UG/1
2 INHALANT RESPIRATORY (INHALATION) EVERY 6 HOURS PRN
Qty: 8.5 G | Refills: 1 | Status: SHIPPED | OUTPATIENT
Start: 2024-10-09

## 2024-11-20 ENCOUNTER — OFFICE VISIT (OUTPATIENT)
Dept: FAMILY MEDICINE CLINIC | Facility: CLINIC | Age: 54
End: 2024-11-20
Payer: COMMERCIAL

## 2024-11-20 VITALS
DIASTOLIC BLOOD PRESSURE: 78 MMHG | WEIGHT: 175.8 LBS | HEART RATE: 70 BPM | TEMPERATURE: 97.1 F | HEIGHT: 67 IN | SYSTOLIC BLOOD PRESSURE: 118 MMHG | BODY MASS INDEX: 27.59 KG/M2 | OXYGEN SATURATION: 99 %

## 2024-11-20 DIAGNOSIS — F41.1 GENERALIZED ANXIETY DISORDER: Primary | ICD-10-CM

## 2024-11-20 DIAGNOSIS — I75.022 BLUE TOE SYNDROME OF LEFT LOWER EXTREMITY (HCC): ICD-10-CM

## 2024-11-20 PROCEDURE — 99214 OFFICE O/P EST MOD 30 MIN: CPT | Performed by: NURSE PRACTITIONER

## 2024-11-20 RX ORDER — NIFEDIPINE 60 MG/1
60 TABLET, EXTENDED RELEASE ORAL DAILY
Qty: 100 TABLET | Refills: 1 | Status: SHIPPED | OUTPATIENT
Start: 2024-11-20

## 2024-11-20 RX ORDER — ALPRAZOLAM 0.25 MG/1
0.25 TABLET ORAL 2 TIMES DAILY PRN
Qty: 60 TABLET | Refills: 0 | Status: SHIPPED | OUTPATIENT
Start: 2024-11-20

## 2024-11-20 NOTE — ASSESSMENT & PLAN NOTE
Orders:    ALPRAZolam (XANAX) 0.25 mg tablet; Take 1 tablet (0.25 mg total) by mouth 2 (two) times a day as needed for anxiety

## 2024-11-20 NOTE — ASSESSMENT & PLAN NOTE
Orders:    NIFEdipine (PROCARDIA XL) 60 mg 24 hr tablet; Take 1 tablet (60 mg total) by mouth daily

## 2024-11-20 NOTE — PROGRESS NOTES
Name: Emy Ojeda      : 1970      MRN: [id number]  Encounter Provider: JOSE Jaquez  Encounter Date: 2024   Encounter department: [facility]  :  Assessment & Plan  Generalized anxiety disorder    Orders:    ALPRAZolam (XANAX) 0.25 mg tablet; Take 1 tablet (0.25 mg total) by mouth 2 (two) times a day as needed for anxiety    Blue toe syndrome of left lower extremity (HCC)    Orders:    NIFEdipine (PROCARDIA XL) 60 mg 24 hr tablet; Take 1 tablet (60 mg total) by mouth daily           History of Present Illness     53 y.o.female presenting to discuss lab results and to discuss anxiety. Patient reports that she would like to go back on the alprazolam because she is having increased amount and severity of panic attacks. She also is asking for diagnosis codes so she can obtain an IOP for herself and her transgender daughter.Patient requesting a refill on her nifedipine to manage her vascular issues. She was reminded to go for vascular study to follow-up on blue toe syndrome. She has [id number] stroke risk alert on chart related to atrial fibrillation/flutter that she has never had. Do not know how to resolve charting issue but does not need anticoagulation therapy. She believes that her ex- is still able to get information form her and her children's medical records. She is planning to go to [facility] to see if anything can be addressed.        Review of Systems   Constitutional: Negative.    HENT: Negative.     Respiratory: Negative.     Cardiovascular: Negative.    Gastrointestinal: Negative.    Genitourinary: Negative.    Musculoskeletal:  Positive for myalgias.   Neurological: Negative.    Psychiatric/Behavioral:  The patient is nervous/anxious.      Current Outpatient Medications on File Prior to Visit   Medication Sig Dispense Refill    albuterol (PROVENTIL HFA,VENTOLIN HFA) 90 mcg/act inhaler INHALE 2 PUFFS BY MOUTH EVERY 6 HOURS AS NEEDED FOR  "WHEEZING 8.5 g 1    aspirin 325 mg tablet Take 325 mg by mouth daily      cetirizine (ZyrTEC) 10 mg tablet Take 1 tablet (10 mg total) by mouth daily 90 tablet 1    famotidine (PEPCID) 40 MG tablet Take 1 tablet (40 mg total) by mouth daily 90 tablet 1    [DISCONTINUED] NIFEdipine (PROCARDIA XL) 60 mg 24 hr tablet TAKE 1 TABLET(60 MG) BY MOUTH DAILY 100 tablet 1    [DISCONTINUED] ARIPiprazole (ABILIFY) 5 mg tablet Take 5 mg by mouth daily at bedtime (Patient not taking: Reported on 11/20/2024)      [DISCONTINUED] neomycin-polymyxin-hydrocortisone (CORTISPORIN) otic solution Administer 4 drops to the right ear every 8 (eight) hours (Patient not taking: Reported on 11/20/2024) 10 mL 0    [DISCONTINUED] oxyCODONE-acetaminophen (Percocet) 5-325 mg per tablet Take 1 tablet by mouth every 8 (eight) hours as needed for moderate pain Max Daily Amount: 3 tablets (Patient not taking: Reported on 11/20/2024) 20 tablet 0     No current facility-administered medications on file prior to visit.         Objective   /78   Pulse 70   Temp (!) 97.1 °F (36.2 °C)   Ht 5' 7\" (1.702 m)   Wt 79.7 kg (175 lb 12.8 oz)   LMP 04/26/2020   SpO2 99%   BMI 27.53 kg/m² (Reviewed)     Physical Exam  Vitals reviewed.   Constitutional:       General: She is not in acute distress.     Appearance: She is not ill-appearing.   HENT:      Head: Normocephalic and atraumatic.      Right Ear: External ear normal.      Left Ear: External ear normal.   Eyes:      Extraocular Movements: Extraocular movements intact.      Conjunctiva/sclera: Conjunctivae normal.      Pupils: Pupils are equal, round, and reactive to light.   Cardiovascular:      Rate and Rhythm: Normal rate and regular rhythm.      Heart sounds: Normal heart sounds.   Pulmonary:      Effort: Pulmonary effort is normal.      Breath sounds: Normal breath sounds.   Abdominal:      General: Abdomen is flat. Bowel sounds are normal. There is no distension.      Palpations: Abdomen is " soft.      Tenderness: There is no abdominal tenderness.   Musculoskeletal:      Cervical back: Neck supple.   Skin:     General: Skin is warm and dry.      Capillary Refill: Capillary refill takes less than 2 seconds.   Neurological:      Mental Status: She is alert and oriented to person, place, and time.      Cranial Nerves: Cranial nerves 2-12 are intact.      Deep Tendon Reflexes: Reflexes are normal and symmetric.   Psychiatric:         Mood and Affect: Mood is anxious.         Speech: Speech normal.         Behavior: Behavior normal.

## 2024-11-21 ENCOUNTER — TELEPHONE (OUTPATIENT)
Dept: ADMINISTRATIVE | Facility: OTHER | Age: 54
End: 2024-11-21

## 2024-11-21 NOTE — TELEPHONE ENCOUNTER
----- Message from JOSE Robles sent at 11/20/2024 11:41 AM EST -----  Regarding: Patient Advisory  Patient has a patient advisory for at fib/flutter anticoagulation needed. Patient does not have at fib/flutter ever so unsure were this being reported. I don't know who else to send message

## 2024-11-21 NOTE — TELEPHONE ENCOUNTER
Upon review of the In Basket request we are requesting that you forward this request/concern to the appropriate education email address. The Quality team members assigned to this email will be more than happy to assist you.    Any additional questions or concerns should be emailed to the Practice Liaisons via the appropriate education email address, please do not reply via In Basket.    Thank you  Shayne Palmer MA   [facility]

## 2024-12-08 DIAGNOSIS — J06.9 ACUTE URI: ICD-10-CM

## 2024-12-10 RX ORDER — ALBUTEROL SULFATE 90 UG/1
2 INHALANT RESPIRATORY (INHALATION) EVERY 6 HOURS PRN
Qty: 8.5 G | Refills: 1 | Status: SHIPPED | OUTPATIENT
Start: 2024-12-10

## 2024-12-18 DIAGNOSIS — Z59.86 FINANCIAL INSECURITY: Primary | ICD-10-CM

## 2024-12-18 DIAGNOSIS — F41.1 GENERALIZED ANXIETY DISORDER: ICD-10-CM

## 2024-12-18 RX ORDER — ALPRAZOLAM 0.25 MG/1
0.25 TABLET ORAL 2 TIMES DAILY PRN
Qty: 60 TABLET | Refills: 0 | Status: SHIPPED | OUTPATIENT
Start: 2024-12-18

## 2024-12-18 SDOH — ECONOMIC STABILITY - INCOME SECURITY: FINANCIAL INSECURITY: Z59.86

## 2024-12-19 ENCOUNTER — PATIENT OUTREACH (OUTPATIENT)
Dept: CASE MANAGEMENT | Facility: OTHER | Age: 54
End: 2024-12-19

## 2024-12-19 NOTE — PROGRESS NOTES
YADIRA PULIDO received referral from pt's PCP for assistance with MA paperwork. Pt previously worked with OP CM team last year.    YADIRA PULIDO placed call to pt to introduce self and discuss pt's needs. Patient did not answer. YADIRA PULIDO left message asking patient to return call. YADIRA PULIDO will try to reach pt again within one week.

## 2024-12-26 ENCOUNTER — PATIENT OUTREACH (OUTPATIENT)
Dept: CASE MANAGEMENT | Facility: OTHER | Age: 54
End: 2024-12-26

## 2024-12-26 NOTE — PROGRESS NOTES
YADIRA PULIDO placed call to pt to f/u on referral. Patient did not answer. YADIRA PULIDO left message asking patient to return call.     UTR letter sent via [employer] as this was the second unsuccessful outreach attempt made to f/u on referral. Referral closed.    YADIRA PULIDO is available should pt reach out to discuss needs.

## 2024-12-26 NOTE — LETTER
[address] [facility] [address]  [phone]    Re: Unable to Reach   12/26/2024       Dear Emy,    I am a [facility]  and wanted to be certain you had information to contact me should you desire assistance with or have questions about non-medical aspects of your care such as [but not limited to] medical insurance, housing, transportation, material needs, or emergency needs.  If I do not have an answer I will assist you in finding the appropriate agency or individual who can help.      Please feel free to contact me at [phone]. Thank You.    Sincerely,         Pippa Elizabeth LCSW

## 2025-02-01 DIAGNOSIS — J06.9 ACUTE URI: ICD-10-CM

## 2025-02-03 RX ORDER — ALBUTEROL SULFATE 90 UG/1
2 INHALANT RESPIRATORY (INHALATION) EVERY 6 HOURS PRN
Qty: 8.5 G | Refills: 1 | Status: SHIPPED | OUTPATIENT
Start: 2025-02-03

## 2025-02-06 DIAGNOSIS — K21.9 GASTROESOPHAGEAL REFLUX DISEASE: ICD-10-CM

## 2025-02-07 RX ORDER — FAMOTIDINE 40 MG/1
40 TABLET, FILM COATED ORAL DAILY
Qty: 90 TABLET | Refills: 1 | Status: SHIPPED | OUTPATIENT
Start: 2025-02-07

## 2025-02-14 DIAGNOSIS — L50.9 URTICARIA: ICD-10-CM

## 2025-02-14 RX ORDER — CETIRIZINE HYDROCHLORIDE 10 MG/1
10 TABLET ORAL DAILY
Qty: 90 TABLET | Refills: 1 | Status: SHIPPED | OUTPATIENT
Start: 2025-02-14

## 2025-03-26 DIAGNOSIS — J06.9 ACUTE URI: ICD-10-CM

## 2025-03-27 RX ORDER — ALBUTEROL SULFATE 90 UG/1
2 INHALANT RESPIRATORY (INHALATION) EVERY 6 HOURS PRN
Qty: 8.5 G | Refills: 1 | Status: SHIPPED | OUTPATIENT
Start: 2025-03-27

## 2025-04-02 ENCOUNTER — OFFICE VISIT (OUTPATIENT)
Dept: FAMILY MEDICINE CLINIC | Facility: CLINIC | Age: 55
End: 2025-04-02

## 2025-04-02 VITALS
SYSTOLIC BLOOD PRESSURE: 126 MMHG | OXYGEN SATURATION: 98 % | BODY MASS INDEX: 26.53 KG/M2 | HEART RATE: 80 BPM | DIASTOLIC BLOOD PRESSURE: 82 MMHG | TEMPERATURE: 97.6 F | WEIGHT: 169 LBS | HEIGHT: 67 IN

## 2025-04-02 DIAGNOSIS — J01.20 ACUTE NON-RECURRENT ETHMOIDAL SINUSITIS: Primary | ICD-10-CM

## 2025-04-02 DIAGNOSIS — M54.50 ACUTE LEFT-SIDED LOW BACK PAIN WITHOUT SCIATICA: ICD-10-CM

## 2025-04-02 RX ORDER — DOXEPIN HYDROCHLORIDE 100 MG/1
100 CAPSULE ORAL
COMMUNITY
Start: 2025-03-13

## 2025-04-02 RX ORDER — OXYCODONE AND ACETAMINOPHEN 5; 325 MG/1; MG/1
1 TABLET ORAL EVERY 6 HOURS PRN
Qty: 40 TABLET | Refills: 0 | Status: SHIPPED | OUTPATIENT
Start: 2025-04-02

## 2025-04-02 RX ORDER — ARIPIPRAZOLE 5 MG/1
1 TABLET ORAL DAILY
COMMUNITY
Start: 2025-03-13

## 2025-04-02 RX ORDER — GABAPENTIN 300 MG/1
1 CAPSULE ORAL 2 TIMES DAILY
COMMUNITY
Start: 2025-03-25

## 2025-04-02 NOTE — PROGRESS NOTES
"Name: Emy Ojeda      : 1970      MRN: [id number]  Encounter Provider: JOSE Jaquez  Encounter Date: 2025   Encounter department: [facility]  :  Assessment & Plan  Acute non-recurrent ethmoidal sinusitis    Orders:    amoxicillin-clavulanate (AUGMENTIN) 875-125 mg per tablet; Take 1 tablet by mouth every 12 (twelve) hours for 7 days    Acute left-sided low back pain without sciatica    Orders:    oxyCODONE-acetaminophen (Percocet) 5-325 mg per tablet; Take 1 tablet by mouth every 6 (six) hours as needed for moderate pain Max Daily Amount: 4 tablets           History of Present Illness   54 y.o.female presenting with sore throat, nasal congestion, postnasal drip, headache, right ear feels clogged and slight shortness of breath for the past month.  Patient also has been having right sided lumbar back pain for the past week.        Review of Systems   Constitutional: Negative.    HENT:  Positive for congestion, ear pain, postnasal drip, rhinorrhea and sinus pressure.    Respiratory: Negative.     Cardiovascular: Negative.    Gastrointestinal: Negative.    Musculoskeletal:  Positive for back pain.   Neurological:  Positive for headaches.   Psychiatric/Behavioral: Negative.         Objective   /82 (BP Location: Right arm, Patient Position: Sitting, Cuff Size: Large)   Pulse 80   Temp 97.6 °F (36.4 °C) (Temporal)   Ht 5' 7\" (1.702 m)   Wt 76.7 kg (169 lb)   LMP 2020   SpO2 98%   BMI 26.47 kg/m² (Reviewed)     Physical Exam  Vitals reviewed.   Constitutional:       General: She is not in acute distress.     Appearance: She is not ill-appearing.   HENT:      Head: Normocephalic and atraumatic.      Right Ear: Ear canal and external ear normal. Tympanic membrane is erythematous and bulging.      Left Ear: Ear canal and external ear normal. Tympanic membrane is erythematous.      Nose: Nasal tenderness and congestion present.      Mouth/Throat:      Lips: " Pink.      Mouth: Mucous membranes are moist.      Pharynx: Oropharynx is clear. Posterior oropharyngeal erythema present.   Eyes:      Extraocular Movements: Extraocular movements intact.      Conjunctiva/sclera: Conjunctivae normal.      Pupils: Pupils are equal, round, and reactive to light.   Cardiovascular:      Rate and Rhythm: Normal rate and regular rhythm.      Heart sounds: Normal heart sounds.   Pulmonary:      Effort: Pulmonary effort is normal.      Breath sounds: Normal breath sounds.   Musculoskeletal:         General: Tenderness present.      Lumbar back: Swelling, tenderness and bony tenderness present. No deformity or spasms. Normal range of motion. Negative right straight leg raise test and negative left straight leg raise test.   Skin:     General: Skin is warm and dry.      Capillary Refill: Capillary refill takes less than 2 seconds.   Neurological:      Mental Status: She is alert and oriented to person, place, and time.   Psychiatric:         Mood and Affect: Mood normal.         Behavior: Behavior normal.       BMI Counseling: Body mass index is 26.47 kg/m². The BMI is above normal. Nutrition recommendations include reducing portion sizes, decreasing overall calorie intake, 3-5 servings of fruits/vegetables daily, reducing fast food intake, consuming healthier snacks, decreasing soda and/or juice intake, moderation in carbohydrate intake, and increasing intake of lean protein. Exercise recommendations include moderate aerobic physical activity for 150 minutes/week and exercising 3-5 times per week.

## 2025-04-08 ENCOUNTER — VBI (OUTPATIENT)
Dept: ADMINISTRATIVE | Facility: OTHER | Age: 55
End: 2025-04-08

## 2025-04-08 NOTE — TELEPHONE ENCOUNTER
04/08/25 12:56 PM     Chart reviewed for Pap Smear (HPV) aka Cervical Cancer Screening ; nothing is submitted to the patient's insurance at this time.     Mary Parker   [facility]

## 2025-04-14 ENCOUNTER — PATIENT MESSAGE (OUTPATIENT)
Dept: FAMILY MEDICINE CLINIC | Facility: CLINIC | Age: 55
End: 2025-04-14

## 2025-04-14 ENCOUNTER — NURSE TRIAGE (OUTPATIENT)
Age: 55
End: 2025-04-14

## 2025-04-14 DIAGNOSIS — J01.00 ACUTE NON-RECURRENT MAXILLARY SINUSITIS: Primary | ICD-10-CM

## 2025-04-14 RX ORDER — PREDNISONE 10 MG/1
TABLET ORAL
Qty: 26 TABLET | Refills: 0 | Status: SHIPPED | OUTPATIENT
Start: 2025-04-14

## 2025-04-14 RX ORDER — DOXYCYCLINE 100 MG/1
100 CAPSULE ORAL EVERY 12 HOURS SCHEDULED
Qty: 14 CAPSULE | Refills: 0 | Status: SHIPPED | OUTPATIENT
Start: 2025-04-14 | End: 2025-04-21

## 2025-04-14 NOTE — TELEPHONE ENCOUNTER
"Reason for Disposition  • Taking antibiotic > 72 hours (3 days) and sinus pain not improved    Additional Information  • Negative: No answer.  First attempt to contact caller.  Follow-up call scheduled within 15 minutes.    Answer Assessment - Initial Assessment Questions  1. ANTIBIOTIC: \"What antibiotic are you taking?\" \"How many times a day?\"      Amoxicillin   2. ONSET: \"When was the antibiotic started?\"      4/4   3. PAIN: \"How bad is the pain?\"   (Scale 0-10; or none, mild, moderate or severe)      Sinus pain 7 out of 10   4. FEVER: \"Do you have a fever?\" If Yes, ask: \"What is it, how was it measured, and when did it start?\"       No   5. SYMPTOMS: \"Are there any other symptoms you're concerned about?\" If Yes, ask: \"When did it start?\"     Right ear is stilled clogged, mouth breathing., green discharge from the nose   6. PREGNANCY: \"Is there any chance you are pregnant?\" \"When was your last menstrual period?\"      No    Protocols used: No Contact or Duplicate Contact Call-Adult-[place], Sinus Infection on Antibiotic Follow-up Call-Adult-[place]    "

## 2025-04-14 NOTE — TELEPHONE ENCOUNTER
FOLLOW UP: ASAP    REASON FOR CONVERSATION: Sinusitis    SYMPTOMS: Sinus infection     OTHER: Patient finished antibiotic 3 days ago, symptoms persist. Please follow up.     DISPOSITION: Discuss With PCP and Callback by Nurse Within 1 Hour (overriding See Today in Office)

## 2025-04-14 NOTE — TELEPHONE ENCOUNTER
"Regarding: sinus infection  ----- Message from Shazia TRACEY sent at 4/14/2025  2:12 PM EDT -----  Message in My Chart: \"Luis Nunez,  would it be possible to get another round of the antibiotics I was taking?  My ear is still closed and my sinuses are clogged up\"    "

## 2025-05-19 DIAGNOSIS — J06.9 ACUTE URI: ICD-10-CM

## 2025-05-20 RX ORDER — ALBUTEROL SULFATE 90 UG/1
2 INHALANT RESPIRATORY (INHALATION) EVERY 6 HOURS PRN
Qty: 8.5 G | Refills: 1 | Status: SHIPPED | OUTPATIENT
Start: 2025-05-20

## 2025-05-23 DIAGNOSIS — I75.022 BLUE TOE SYNDROME OF LEFT LOWER EXTREMITY (HCC): ICD-10-CM

## 2025-05-24 RX ORDER — NIFEDIPINE 60 MG/1
60 TABLET, EXTENDED RELEASE ORAL DAILY
Qty: 100 TABLET | Refills: 1 | Status: SHIPPED | OUTPATIENT
Start: 2025-05-24

## 2025-05-28 ENCOUNTER — OFFICE VISIT (OUTPATIENT)
Dept: FAMILY MEDICINE CLINIC | Facility: CLINIC | Age: 55
End: 2025-05-28
Payer: COMMERCIAL

## 2025-05-28 VITALS
BODY MASS INDEX: 25.92 KG/M2 | HEART RATE: 83 BPM | TEMPERATURE: 97.7 F | DIASTOLIC BLOOD PRESSURE: 82 MMHG | OXYGEN SATURATION: 98 % | WEIGHT: 165.5 LBS | SYSTOLIC BLOOD PRESSURE: 126 MMHG

## 2025-05-28 DIAGNOSIS — J01.40 SUBACUTE PANSINUSITIS: Primary | ICD-10-CM

## 2025-05-28 PROCEDURE — 99213 OFFICE O/P EST LOW 20 MIN: CPT | Performed by: NURSE PRACTITIONER

## 2025-05-28 RX ORDER — CLARITHROMYCIN 500 MG/1
500 TABLET ORAL EVERY 12 HOURS SCHEDULED
Qty: 20 TABLET | Refills: 0 | Status: SHIPPED | OUTPATIENT
Start: 2025-05-28 | End: 2025-06-07

## 2025-05-28 RX ORDER — BUPROPION HYDROCHLORIDE 150 MG/1
150 TABLET, EXTENDED RELEASE ORAL EVERY MORNING
COMMUNITY
Start: 2025-05-21

## 2025-05-28 NOTE — PROGRESS NOTES
Name: Emy Ojeda      : 1970      MRN: [id number]  Encounter Provider: JOSE Jaquez  Encounter Date: 2025   Encounter department: [facility]  :  Assessment & Plan  Subacute pansinusitis    Orders:  •  clarithromycin (BIAXIN) 500 mg tablet; Take 1 tablet (500 mg total) by mouth every 12 (twelve) hours for 10 days           History of Present Illness   54 y.o.female presenting with sinus pressure, headache and bilateral ear pressure for the past month and half since last seen in office. Patient was seen in the office on 2025 for a sinus infection and was treated with a course of Augmentin than tapering course of prednisone and a course of doxycyline. but symptoms did not resolve. Patient was told that she may need sinus x-rays and/or referral to ENT.      Review of Systems   Constitutional: Negative.    HENT:  Positive for congestion, ear pain (fullness) and sinus pressure.    Respiratory: Negative.     Cardiovascular: Negative.    Gastrointestinal: Negative.    Musculoskeletal: Negative.    Neurological:  Positive for headaches. Negative for dizziness and light-headedness.   Psychiatric/Behavioral: Negative.         Objective   /82   Pulse 83   Temp 97.7 °F (36.5 °C)   Wt 75.1 kg (165 lb 8 oz)   LMP 2020   SpO2 98%   BMI 25.92 kg/m² (Reviewed)     Physical Exam  Vitals reviewed.   Constitutional:       General: She is not in acute distress.     Appearance: She is not ill-appearing.   HENT:      Head: Normocephalic and atraumatic.      Right Ear: Ear canal and external ear normal. A middle ear effusion is present.      Left Ear: Ear canal and external ear normal. A middle ear effusion is present.      Nose: Nasal tenderness, mucosal edema and congestion present.      Right Sinus: Maxillary sinus tenderness and frontal sinus tenderness present.      Left Sinus: Maxillary sinus tenderness and frontal sinus tenderness present.      Mouth/Throat:       Lips: Pink.      Mouth: Mucous membranes are moist.      Pharynx: Oropharynx is clear.     Eyes:      Extraocular Movements: Extraocular movements intact.      Conjunctiva/sclera: Conjunctivae normal.      Pupils: Pupils are equal, round, and reactive to light.       Cardiovascular:      Rate and Rhythm: Normal rate and regular rhythm.      Heart sounds: Normal heart sounds.   Pulmonary:      Effort: Pulmonary effort is normal.      Breath sounds: Normal breath sounds.   Abdominal:      General: Abdomen is flat.      Palpations: Abdomen is soft.     Skin:     General: Skin is warm and dry.      Capillary Refill: Capillary refill takes less than 2 seconds.     Neurological:      Mental Status: She is alert and oriented to person, place, and time.     Psychiatric:         Mood and Affect: Mood normal.         Behavior: Behavior normal.

## 2025-06-09 ENCOUNTER — VBI (OUTPATIENT)
Dept: ADMINISTRATIVE | Facility: OTHER | Age: 55
End: 2025-06-09

## 2025-06-09 NOTE — TELEPHONE ENCOUNTER
06/09/25 10:35 AM     Chart reviewed for CRC: Colonoscopy ; nothing is submitted to the patient's insurance at this time.     Michael Harley MA   [facility]

## 2025-06-11 DIAGNOSIS — I75.022 BLUE TOE SYNDROME OF LEFT LOWER EXTREMITY (HCC): ICD-10-CM

## 2025-06-12 RX ORDER — NIFEDIPINE 60 MG/1
60 TABLET, EXTENDED RELEASE ORAL DAILY
Qty: 100 TABLET | Refills: 0 | OUTPATIENT
Start: 2025-06-12

## 2025-06-12 RX ORDER — DOXEPIN HYDROCHLORIDE 100 MG/1
100 CAPSULE ORAL
Qty: 90 CAPSULE | Refills: 0 | Status: SHIPPED | OUTPATIENT
Start: 2025-06-12

## 2025-06-19 DIAGNOSIS — I75.022 BLUE TOE SYNDROME OF LEFT LOWER EXTREMITY (HCC): ICD-10-CM

## 2025-06-19 RX ORDER — NIFEDIPINE 60 MG/1
60 TABLET, EXTENDED RELEASE ORAL DAILY
Qty: 100 TABLET | Refills: 1 | Status: SHIPPED | OUTPATIENT
Start: 2025-06-19

## 2025-07-07 NOTE — PROGRESS NOTES
[facility] spoke to patient and pt is confirming that we will be meeting on Wednesday January 11, 2023 at her doctors office to complete the Birth Certificate information  [address] [place] [address] along with patient called [facility] renewal  to do the renewal of food and medical benefits for patient and her children  Case # [id number]    Reference # [id number] for call  [address] [place] [address] along with patient called social security office to get a copy of patient social security statement (award letter 2023)    [address] [place] [address] help patient scheduled appointment for child Joel Costa to get SSI benefits  Appt is scheduled for 01/30/23 at 11:30am in the office  They will be calling my phone at [phone]  I will meet pt at doctors office at [address]  [id number]  Pt does not want to use her own personal phone  [address] [place] [address] also completed [employer] application for patient  I have all supporting documents  Pt needs to provide me the PPL account number  Pt states she is going through her separation with her ex-  Pt believes he is able to hear everything she says in the house  Pt feels more comfortable doing things at doctors office  Pt was very emotional on this visit  Pt wants to discuss some things with YADIRA Rand  Pt doctor will be seeing patient briefly after meeting w/ me  Statement Selected

## 2025-07-26 DIAGNOSIS — K21.9 GASTROESOPHAGEAL REFLUX DISEASE: ICD-10-CM

## 2025-07-29 RX ORDER — FAMOTIDINE 40 MG/1
40 TABLET, FILM COATED ORAL DAILY
Qty: 90 TABLET | Refills: 1 | Status: SHIPPED | OUTPATIENT
Start: 2025-07-29

## 2025-08-08 ENCOUNTER — VBI (OUTPATIENT)
Dept: ADMINISTRATIVE | Facility: OTHER | Age: 55
End: 2025-08-08

## (undated) DEVICE — TROCAR: Brand: KII SLEEVE

## (undated) DEVICE — 1840 FOAM BLOCK NEEDLE COUNTER: Brand: DEVON

## (undated) DEVICE — INTENDED FOR TISSUE SEPARATION, AND OTHER PROCEDURES THAT REQUIRE A SHARP SURGICAL BLADE TO PUNCTURE OR CUT.: Brand: BARD-PARKER ® CARBON RIB-BACK BLADES

## (undated) DEVICE — ELECTRODE BLADE MOD  E-Z CLEAN 6.5IN -0014M

## (undated) DEVICE — TRAY FOLEY 16FR URIMETER SILICONE SURESTEP

## (undated) DEVICE — ENSEAL 20 CM SHAFT, LARGE JAW: Brand: ENSEAL X1

## (undated) DEVICE — CHLORHEXIDINE 4PCT 4 OZ

## (undated) DEVICE — GLOVE INDICATOR PI UNDERGLOVE SZ 7 BLUE

## (undated) DEVICE — HEMOSTATIC MATRIX SURGIFLO 8ML W/THROMBIN

## (undated) DEVICE — SUT VICRYL 0 CT-1 CR/8 27 IN JJ41G

## (undated) DEVICE — PVC URETHRAL CATHETER: Brand: DOVER

## (undated) DEVICE — PLUMEPEN PRO 10FT

## (undated) DEVICE — CHLORAPREP HI-LITE 26ML ORANGE

## (undated) DEVICE — GLOVE PI ULTRA TOUCH SZ.6.5

## (undated) DEVICE — TUBING SUCTION 5MM X 12 FT

## (undated) DEVICE — NEEDLE SPINAL 22G X 3.5IN  QUINCKE

## (undated) DEVICE — SYRINGE 10ML LL

## (undated) DEVICE — SYRINGE 50ML LL

## (undated) DEVICE — TROCAR: Brand: KII FIOS FIRST ENTRY

## (undated) DEVICE — MAYO STAND COVER: Brand: CONVERTORS

## (undated) DEVICE — ARTHROSCOPY FLOOR MAT

## (undated) DEVICE — STRL ALLENTOWN HYSTEROSCOPY PK: Brand: CARDINAL HEALTH

## (undated) DEVICE — MEDI-VAC YANK SUCT HNDL W/TPRD BULBOUS TIP: Brand: CARDINAL HEALTH

## (undated) DEVICE — CYSTO TUBING SINGLE IRRIGATION

## (undated) DEVICE — TOWEL SURG XR DETECT GREEN STRL RFD

## (undated) DEVICE — SPONGE LAP 18 X 18 IN STRL RFD

## (undated) DEVICE — NEEDLE 22 G X 1 1/2 SAFETY

## (undated) DEVICE — STERILE SURGICAL LUBRICANT,  TUBE: Brand: SURGILUBE

## (undated) DEVICE — BETHLEHEM UNIVERSAL MINOR VAG: Brand: CARDINAL HEALTH

## (undated) DEVICE — BETHLEHEM UNIVERSAL GYN LAP PK: Brand: CARDINAL HEALTH

## (undated) DEVICE — HEAVY DUTY TABLE COVER: Brand: CONVERTORS

## (undated) DEVICE — ADHESIVE SKIN HIGH VISCOSITY EXOFIN 1ML

## (undated) DEVICE — ENSEAL LAPAROSCOPIC TISSUE SEALER G2 ARTICULATING  CURVED JAW FOR USE WITH G2 GENERATOR 5MM DIAMETER 35CM SHAFT LENGTH: Brand: ENSEAL

## (undated) DEVICE — LIGHT HANDLE COVER SLEEVE DISP BLUE STELLAR

## (undated) DEVICE — PREMIUM DRY TRAY LF: Brand: MEDLINE INDUSTRIES, INC.

## (undated) DEVICE — SUT MONOCRYL 4-0 PS-2 27 IN Y426H

## (undated) DEVICE — DRAPE LAPAROTOMY W/POUCHES

## (undated) DEVICE — GLOVE INDICATOR PI UNDERGLOVE SZ 6.5 BLUE